# Patient Record
Sex: FEMALE | Race: WHITE
[De-identification: names, ages, dates, MRNs, and addresses within clinical notes are randomized per-mention and may not be internally consistent; named-entity substitution may affect disease eponyms.]

---

## 2017-01-04 ENCOUNTER — HOSPITAL ENCOUNTER (EMERGENCY)
Dept: HOSPITAL 72 - EMR | Age: 32
Discharge: HOME | End: 2017-01-04
Payer: MEDICARE

## 2017-01-04 VITALS — DIASTOLIC BLOOD PRESSURE: 79 MMHG | SYSTOLIC BLOOD PRESSURE: 110 MMHG

## 2017-01-04 VITALS — SYSTOLIC BLOOD PRESSURE: 107 MMHG | DIASTOLIC BLOOD PRESSURE: 75 MMHG

## 2017-01-04 VITALS — BODY MASS INDEX: 19.16 KG/M2 | HEIGHT: 65 IN | WEIGHT: 115 LBS

## 2017-01-04 DIAGNOSIS — Z91.040: ICD-10-CM

## 2017-01-04 DIAGNOSIS — Z87.898: ICD-10-CM

## 2017-01-04 DIAGNOSIS — G43.909: Primary | ICD-10-CM

## 2017-01-04 DIAGNOSIS — Z88.2: ICD-10-CM

## 2017-01-04 DIAGNOSIS — Z88.0: ICD-10-CM

## 2017-01-04 DIAGNOSIS — Z87.19: ICD-10-CM

## 2017-01-04 DIAGNOSIS — Z88.6: ICD-10-CM

## 2017-01-04 DIAGNOSIS — Z87.42: ICD-10-CM

## 2017-01-04 LAB
APPEARANCE UR: CLEAR
BACTERIA #/AREA URNS HPF: (no result) /HPF
KETONES UR QL STRIP: NEGATIVE
LEUKOCYTE ESTERASE UR QL STRIP: (no result)
NITRITE UR QL STRIP: NEGATIVE
PH UR STRIP: 6 [PH] (ref 4.5–8)
PROT UR QL STRIP: NEGATIVE
RBC #/AREA URNS HPF: (no result) /HPF (ref 0–2)
SP GR UR STRIP: 1.01 (ref 1–1.03)
SQUAMOUS #/AREA URNS LPF: (no result) /LPF
UROBILINOGEN UR-MCNC: NORMAL MG/DL (ref 0–1)
WBC #/AREA URNS HPF: (no result) /HPF (ref 0–2)

## 2017-01-04 PROCEDURE — 96375 TX/PRO/DX INJ NEW DRUG ADDON: CPT

## 2017-01-04 PROCEDURE — 36415 COLL VENOUS BLD VENIPUNCTURE: CPT

## 2017-01-04 PROCEDURE — 81003 URINALYSIS AUTO W/O SCOPE: CPT

## 2017-01-04 PROCEDURE — 99284 EMERGENCY DEPT VISIT MOD MDM: CPT

## 2017-01-04 PROCEDURE — 83690 ASSAY OF LIPASE: CPT

## 2017-01-04 PROCEDURE — 96374 THER/PROPH/DIAG INJ IV PUSH: CPT

## 2017-01-04 NOTE — EMERGENCY ROOM REPORT
History of Present Illness


General


Chief Complaint:  Pain





Present Illness


HPI


32 YO female presents to the ED c/o  migraine HA 10/10 in severity progressive 

onset denies N/V/F/C. Describes migraine as usual migraine HA and has Neurology 

appt. next week. Pt. reports that Imitrex does not work for her and in the ED 

she usually gets Ativan and Benadryl for her migraines.  Pt also reports recent 

dx earlier this week with bilateral ovarian cysts and states continued pain due 

to OBGYN not prescribing her medications and telling her they will resolve on 

their own.  PT describes bilateral adenexal pain radiating into the flanks. 

denies hematuria, frequency, or dysuria. Lastly pt. also requests evaluation of 

her "pancreatitis" as she intermittently has epigastric pain and is NPO and 

requires TPN.  Pt. denies pregnancy states is on Nexplanon implant, Denies hx 

of STI or PID. Denies CP, Palpitations, LOC, AMS, dizziness, Changes in Vision, 

Sensation, paresthesias, or a sudden severe headache.


Allergies:  


Coded Allergies:  


     ASPIRIN (Unverified  Allergy, Unknown, 4/17/16)


     LATEX (Verified  Allergy, Unknown, 4/17/16)


     METOCLOPRAMIDE (Unverified  Allergy, Unknown, 4/17/16)


     PENICILLIN (Unverified  Allergy, Unknown, 4/17/16)


     PROCHLORPERAZINE (Unverified  Allergy, Unknown, 8/5/15)


     SULFA (SULFONAMIDE ANTIBIOTICS) (Unverified  Allergy, Unknown, 8/5/15)


Uncoded Allergies:  


     SULFA (Allergy, Unknown, 4/17/16)





Patient History


Past Medical History:  see triage record


Past Surgical History:  none


Pertinent Family History:  none


Last Menstrual Period:  12/11/16


Pregnant Now:  No


Reviewed Nursing Documentation:  PMH: Agreed, PSxH: Agreed





Nursing Documentation-PMH


Hx Cardiac Problems:  No


Hx Hypertension:  No


Hx Pacemaker:  No


Hx Asthma:  No


Hx COPD:  No


Hx Diabetes:  No


Hx Cancer:  No


Hx Gastrointestinal Problems:  Yes - pancreatitis


Hx Dialysis:  Yes


Hx Neurological Problems:  Yes - Migraines


Hx Cerebrovascular Accident:  No


Hx Seizures:  No


Hx Vertigo:  Yes


Hx Dizziness:  Yes


Hx Headaches:  Yes - migraines





Review of Systems


All Other Systems:  negative except mentioned in HPI





Physical Exam





Vital Signs








  Date Time  Temp Pulse Resp B/P Pulse Ox O2 Delivery O2 Flow Rate FiO2


 


1/4/17 12:58 98.2 116 20 107/75 100 Room Air  








Sp02 EP Interpretation:  reviewed, normal - VS re-evaluated pt. is 

nontachycardic


General Appearance:  no apparent distress, alert, GCS 15, non-toxic, other - Pt 

observed from distance in NAD on her phone during ED visit.


Head:  normocephalic, atraumatic


Eyes:  bilateral eye PERRL, bilateral eye normal inspection


ENT:  hearing grossly normal, normal pharynx, no angioedema, normal voice


Neck:  full range of motion, supple/symm/no masses


Respiratory:  chest non-tender, lungs clear, normal breath sounds, speaking 

full sentences


Cardiovascular #1:  regular rate, rhythm


Cardiovascular #2:  2+ radial (R), 2+ radial (L)


Gastrointestinal:  normal bowel sounds, soft, no mass, no guarding, no rebound, 

other - diffuse TTP in epigastric area, RUQ , negative mcburnys, no peritoneal 

signs


Rectal:  deferred


Genitourinary:  normal inspection, no CVA tenderness, other - bilateral 

adenexal TTP, no LAD


Musculoskeletal:  back normal, gait/station normal, normal range of motion, non-

tender, no calf tenderness


Neurologic:  alert, oriented x3, responsive, motor strength/tone normal, 

sensory intact, cerebellar normal, normal gait, speech normal


Psychiatric:  judgement/insight normal, memory normal, mood/affect normal, no 

suicidal/homicidal ideation


Skin:  normal color, no rash, warm/dry, well hydrated


Lymphatic:  no adenopathy





Medical Decision Making


PA Attestation


Dr. George is my supervising Physician whom patient management has been 

discussed with.


Diagnostic Impression:  


 Primary Impression:  


 Migraine headache


 Qualified Codes:  G43.909 - Migraine, unspecified, not intractable, without 

status migrainosus


 Additional Impressions:  


 Hx of ovarian cyst


 Hx of pancreatitis


ER Course


Pt. presents to the ED c/o 


1) migraine 10/10 in severity, describes  typical migraine. progressive onset, 

with neurologist appt. next week. pt states Imitrex does not work for her, and 

states " only IV Ativan and Benadryl help "





2) Pt also requests "evaluation of her pancreatitis" PT reports chronic 

epigastric pain, nausea, decreased appetite, inability to have oral intake and 

requires TPN. 





3) Pt. reports continued Bilateral Adenexal pain with hx of ovarian cyst with 

US performed this past week at [facility]. pt reports "OBGYN saw bilateral ovarian 

cysts and stated there is no surgery needed, and also did not  prescribe her 

with anything."  





Ddx considered but are not limited to migraine, SAH, Psedudo motor Cerebri, 

Mass lesion, Cluster HA, Tension HA, Post lumbar puncture HA, acute pancreatitis

, ovarian cysts, torsion, ectopic pregnancy. 





Vital signs: are WNL, pt. is afebrile


H&PE are most consistent with migraine headache. 





- Previous recent visits with laboratory work noted to be normal. Pt noted to 

have multiple visits for both migraine and abdominal pain with similar 

presentations. I do not feel further laboratory or imaging tests are warranted 

other than Lipase and UA.  pt. is not actively vomiting at this time in NAD, PE 

does not suggest dehydration. Pt is ambulatory with no focal neurological 

deficits due to progressive onset and hx of migraines CT imaging is not 

warranted at this time.  





- D/w pt. that ovarian cysts are usually benign and treated with oral anti-

inflammatory medications. - Pt states she cannot have medications orally due to 

her gastritis and pancreatitis. 





ORDERS: 


- Lipase: 61 WNL


-UA: WNL no evidence of infection no wbc's no leukocyte esterase, few bacteria 

and few epithelial cells indicate contamination. 





ED INTERVENTIONS: 


25 mg Benadryl IV


-150mg Zantac- Pt. refused and requested all meds to be IV


-50mg Pepcid IV


-1mg Ativan IV


-25mg Benadryl (second dose) 








-After discussion of laboratory results and discharge plan pt. began to request 

stronger pain medications as interventions provided have not completely 

relieved her pain. 


- D/w pt. that she needs to follow up with GI specialist, Neurologist, OBGYN, 

PCP and possibly Chronic Pain management doctor, as she is stable for out 

patient treatment, and  normally she would be prescribed tylenol or motrin 

however she refused stating she can't have PO meds, d/w pt. that her 

presentation and lab results today to not warrant I.V pain medications and also 

discussed with pt. that we cant d/c her with I.V pain medications so giving her 

one dose here ultimately will only be a short-duration temporary intervention.





- Pt. makes several complaints that "we never completely treat her here in the 

ED, and even the doctors upstairs never follow up." - d/w pt. that her exam and 

laboratory work does not indicate further interventions or work up in the ED, 

that she is stable to outpatient follow up. 





-d/w pt. that  dilaudid and morphine are not standard of care for migraines. 





PT is well known to [facility] for pain related complaints, with drug seeking 

behavior, and Opioid dependence, in addition to non-satisfaction with multiple 

ED providers.  





DISCHARGE: At this time pt. is stable for d/c to home. Will provide printed 

patient care instructions, and any necessary prescriptions. Care plan and 

follow up instructions have been discussed with the patient prior to discharge.





Labs








Test


  1/4/17


14:01 1/4/17


15:00


 


Urine Color Pale yellow  


 


Urine Appearance Clear  


 


Urine pH 6 (4.5-8.0)  


 


Urine Specific Gravity


  1.015


(1.005-1.035) 


 


 


Urine Protein


  Negative


(NEGATIVE) 


 


 


Urine Glucose (UA)


  Negative


(NEGATIVE) 


 


 


Urine Ketones


  Negative


(NEGATIVE) 


 


 


Urine Occult Blood 1+ (NEGATIVE)  


 


Urine Nitrite


  Negative


(NEGATIVE) 


 


 


Urine Bilirubin


  Negative


(NEGATIVE) 


 


 


Urine Urobilinogen


  Normal MG/DL


(0.0-1.0) 


 


 


Urine Leukocyte Esterase 1+ (NEGATIVE)  


 


Urine RBC


  0-2 /HPF (0 -


2) 


 


 


Urine WBC


  0-2 /HPF (0 -


2) 


 


 


Urine Squamous Epithelial


Cells Few /LPF


(NONE/OCC) 


 


 


Urine Bacteria


  Few /HPF


(NONE) 


 


 


Lipase  61 U/L (< 60) 











Last Vital Signs








  Date Time  Temp Pulse Resp B/P Pulse Ox O2 Delivery O2 Flow Rate FiO2


 


1/4/17 12:58 98.2 116 20 107/75 100 Room Air  








Disposition:  HOME, SELF-CARE


Condition:  Stable


Referrals:  


NOT CHOSEN IPA/MD,REFERRING (PCP)


Patient Instructions:  Migraine Headache, Easy-to-Read





Additional Instructions:  


Take any previously prescribed medications as directed. 


-Follow up with your Neurologist  in 3-4 days 


- Follow up with your PCP in 3-4 days.


Return sooner to ED if new symptoms occur, or current symptoms become worse.











Ana Aguilar Jan 4, 2017 15:39

## 2017-02-12 ENCOUNTER — HOSPITAL ENCOUNTER (EMERGENCY)
Dept: HOSPITAL 54 - ER | Age: 32
Discharge: HOME | End: 2017-02-12
Payer: SELF-PAY

## 2017-02-12 VITALS — BODY MASS INDEX: 20.33 KG/M2 | WEIGHT: 122 LBS | HEIGHT: 65 IN

## 2017-02-12 VITALS — DIASTOLIC BLOOD PRESSURE: 97 MMHG | SYSTOLIC BLOOD PRESSURE: 128 MMHG

## 2017-02-12 DIAGNOSIS — Z88.8: ICD-10-CM

## 2017-02-12 DIAGNOSIS — Z88.6: ICD-10-CM

## 2017-02-12 DIAGNOSIS — Z90.49: ICD-10-CM

## 2017-02-12 DIAGNOSIS — K21.9: ICD-10-CM

## 2017-02-12 DIAGNOSIS — G43.909: Primary | ICD-10-CM

## 2017-02-12 PROCEDURE — A4606 OXYGEN PROBE USED W OXIMETER: HCPCS

## 2017-02-12 PROCEDURE — 99284 EMERGENCY DEPT VISIT MOD MDM: CPT

## 2017-02-12 PROCEDURE — 96361 HYDRATE IV INFUSION ADD-ON: CPT

## 2017-02-12 PROCEDURE — Z7610: HCPCS

## 2017-02-12 PROCEDURE — 96375 TX/PRO/DX INJ NEW DRUG ADDON: CPT

## 2017-02-12 PROCEDURE — 96374 THER/PROPH/DIAG INJ IV PUSH: CPT

## 2017-03-14 ENCOUNTER — HOSPITAL ENCOUNTER (EMERGENCY)
Dept: HOSPITAL 72 - EMR | Age: 32
Discharge: HOME | End: 2017-03-14
Payer: MEDICARE

## 2017-03-14 VITALS — WEIGHT: 115 LBS | HEIGHT: 65 IN | BODY MASS INDEX: 19.16 KG/M2

## 2017-03-14 VITALS — DIASTOLIC BLOOD PRESSURE: 72 MMHG | SYSTOLIC BLOOD PRESSURE: 111 MMHG

## 2017-03-14 DIAGNOSIS — F11.20: ICD-10-CM

## 2017-03-14 DIAGNOSIS — Z88.0: ICD-10-CM

## 2017-03-14 DIAGNOSIS — Z91.040: ICD-10-CM

## 2017-03-14 DIAGNOSIS — K86.1: Primary | ICD-10-CM

## 2017-03-14 DIAGNOSIS — Z88.8: ICD-10-CM

## 2017-03-14 DIAGNOSIS — Z88.6: ICD-10-CM

## 2017-03-14 DIAGNOSIS — E11.9: ICD-10-CM

## 2017-03-14 DIAGNOSIS — K21.9: ICD-10-CM

## 2017-03-14 DIAGNOSIS — Z88.2: ICD-10-CM

## 2017-03-14 LAB
ALBUMIN/GLOB SERPL: 1.6 {RATIO} (ref 1–2.7)
ALT SERPL-CCNC: 9 U/L (ref 3–33)
ANION GAP SERPL CALC-SCNC: 15 MMOL/L (ref 5–15)
APPEARANCE UR: CLEAR
AST SERPL-CCNC: 18 U/L (ref 5–40)
BACTERIA #/AREA URNS HPF: (no result) /HPF
BASOPHILS NFR BLD AUTO: 1.2 % (ref 0–2)
CALCIUM SERPL-MCNC: 9.2 MG/DL (ref 8.6–10.2)
CHLORIDE SERPL-SCNC: 104 MEQ/L (ref 98–107)
CO2 SERPL-SCNC: 25 MEQ/L (ref 20–30)
CREAT SERPL-MCNC: 0.6 MG/DL (ref 0.5–0.9)
EOSINOPHIL NFR BLD AUTO: 4.7 % (ref 0–3)
ERYTHROCYTE [DISTWIDTH] IN BLOOD BY AUTOMATED COUNT: 11.9 % (ref 11.6–14.8)
GFR SERPLBLD BASED ON 1.73 SQ M-ARVRAT: > 60 ML/MIN (ref 60–?)
GLOBULIN SER-MCNC: 2.6 G/DL
HEMOLYSIS: 7
KETONES UR QL STRIP: NEGATIVE
LEUKOCYTE ESTERASE UR QL STRIP: (no result)
LIPASE SERPL-CCNC: 81 U/L (ref ?–60)
LYMPHOCYTES NFR BLD AUTO: 48.5 % (ref 20–45)
MAGNESIUM SERPL-MCNC: 1.6 MG/DL (ref 1.7–2.5)
MCH RBC QN AUTO: 30 PG (ref 27–31)
MCHC RBC AUTO-ENTMCNC: 33.4 G/DL (ref 32–36)
MCV RBC AUTO: 90 FL (ref 80–99)
MONOCYTES NFR BLD AUTO: 6.8 % (ref 1–10)
NEUTROPHILS NFR BLD AUTO: 38.7 % (ref 45–75)
NITRITE UR QL STRIP: NEGATIVE
PH UR STRIP: 7 [PH] (ref 4.5–8)
PLATELET # BLD: 235 K/UL (ref 150–450)
PMV BLD AUTO: 5.8 FL (ref 6.5–10.1)
POTASSIUM SERPL-SCNC: 3.8 MEQ/L (ref 3.4–4.9)
PROT SERPL-MCNC: 6.8 G/DL (ref 6.6–8.7)
PROT UR QL STRIP: NEGATIVE
RBC # BLD AUTO: 3.83 M/UL (ref 4.2–5.4)
RBC #/AREA URNS HPF: (no result) /HPF (ref 0–2)
SODIUM SERPL-SCNC: 144 MEQ/L (ref 135–145)
SP GR UR STRIP: 1.01 (ref 1–1.03)
SQUAMOUS #/AREA URNS LPF: (no result) /LPF
UROBILINOGEN UR-MCNC: NORMAL MG/DL (ref 0–1)
WBC # BLD AUTO: 6.7 K/UL (ref 4.8–10.8)
WBC #/AREA URNS HPF: (no result) /HPF (ref 0–2)

## 2017-03-14 PROCEDURE — 36415 COLL VENOUS BLD VENIPUNCTURE: CPT

## 2017-03-14 PROCEDURE — 80053 COMPREHEN METABOLIC PANEL: CPT

## 2017-03-14 PROCEDURE — 99284 EMERGENCY DEPT VISIT MOD MDM: CPT

## 2017-03-14 PROCEDURE — 81025 URINE PREGNANCY TEST: CPT

## 2017-03-14 PROCEDURE — 85025 COMPLETE CBC W/AUTO DIFF WBC: CPT

## 2017-03-14 PROCEDURE — 82009 KETONE BODYS QUAL: CPT

## 2017-03-14 PROCEDURE — 83690 ASSAY OF LIPASE: CPT

## 2017-03-14 PROCEDURE — 96374 THER/PROPH/DIAG INJ IV PUSH: CPT

## 2017-03-14 PROCEDURE — 83735 ASSAY OF MAGNESIUM: CPT

## 2017-03-14 PROCEDURE — 96375 TX/PRO/DX INJ NEW DRUG ADDON: CPT

## 2017-03-14 PROCEDURE — 81003 URINALYSIS AUTO W/O SCOPE: CPT

## 2017-03-14 NOTE — EMERGENCY ROOM REPORT
History of Present Illness


General


Chief Complaint:  Abdominal Pain


Source:  Patient





Present Illness


HPI


31-year-old female presents to ED for evaluation.  Patient is well known to 

[facility]; has been here multiple times for complaint of 

pancreatitis and pain.  Patient states she is here because she's unable to keep 

fluids down and is having extreme pain.  Pain is sharp, epigastric, 10 of 10, 

nonradiating.  No aggravating relieving factors.  Patient states she did not 

have any pain medications at home.  Patient states her neurologist placed a 

PICC line so she can have pain medications.  Patient states she is not diabetic 

and her sugar is "out of control".  Denies chest pain or shortness of breath.  

Denies any other symptoms


Allergies:  


Coded Allergies:  


     ACETAMINOPHEN (Verified  Allergy, Unknown, 3/14/17)


     ASPIRIN (Unverified  Allergy, Unknown, 3/14/17)


     HALOPERIDOL (Verified  Allergy, Unknown, 3/14/17)


     LATEX (Verified  Allergy, Unknown, 3/14/17)


     METOCLOPRAMIDE (Unverified  Allergy, Unknown, 3/14/17)


     PENICILLIN (Unverified  Allergy, Unknown, 3/14/17)


     PROCHLORPERAZINE (Unverified  Allergy, Unknown, 8/5/15)


     SULFA (SULFONAMIDE ANTIBIOTICS) (Unverified  Allergy, Unknown, 8/5/15)


Uncoded Allergies:  


     SULFA (Allergy, Unknown, 4/17/16)





Patient History


Past Medical History:  DM, GERD, migraines, other - pancreatitis


Past Surgical History:  none


Pertinent Family History:  none


Social History:  Denies: alcohol use, drug use, smoking


Last Menstrual Period:  yesterday


Pregnant Now:  No


Immunizations:  UTD


Reviewed Nursing Documentation:  PMH: Agreed, PSxH: Agreed





Nursing Documentation-PMH


Past Medical History:  No History, Except For


Hx Cardiac Problems:  No - autoimmune disease


Hx Hypertension:  No


Hx Pacemaker:  No


Hx Asthma:  No


Hx COPD:  No


Hx Diabetes:  Yes - dm2


Hx Cancer:  No


Hx Gastrointestinal Problems:  Yes - pancreatitis


Hx Dialysis:  Yes


Hx Neurological Problems:  Yes - Migraines


Hx Cerebrovascular Accident:  No


Hx Seizures:  No


Hx Vertigo:  Yes


Hx Dizziness:  Yes


Hx Headaches:  Yes - migraines





Review of Systems


All Other Systems:  negative except mentioned in HPI





Physical Exam





Vital Signs








  Date Time  Temp Pulse Resp B/P Pulse Ox O2 Delivery O2 Flow Rate FiO2


 


3/14/17 03:01 98.1 111 16 129/87 97   


 


3/14/17 05:04      Room Air  








Sp02 EP Interpretation:  reviewed, normal


General Appearance:  mild distress


Head:  normocephalic


Eyes:  bilateral eye PERRL, bilateral eye normal inspection


ENT:  normal ENT inspection


Neck:  normal inspection


Respiratory:  chest non-tender, lungs clear, normal breath sounds, speaking 

full sentences


Cardiovascular #1:  regular rate, rhythm, no edema


Gastrointestinal:  normal bowel sounds, non tender, soft, non-distended, no 

guarding, no rebound


Rectal:  deferred


Genitourinary:  no CVA tenderness


Musculoskeletal:  normal inspection


Neurologic:  alert, oriented x3, responsive, motor strength/tone normal, 

sensory intact, speech normal


Psychiatric:  normal inspection


Skin:  normal inspection


Lymphatic:  normal inspection





Medical Decision Making


Diagnostic Impression:  


 Primary Impression:  


 Pancreatitis, chronic


 Qualified Codes:  K86.1 - Other chronic pancreatitis


 Additional Impression:  


 Opiate dependence


 Qualified Codes:  F11.20 - Opioid dependence, uncomplicated


ER Course


31-year-old female presents to ED complaining of epigastric pain, vomiting.  

History of chronic pancreatitis





Differential-dehydration, pancreatitis, gastritis





Patient placed on stretcher.  After initial history and physical I ordered labs

, IV fluids, Pepcid, Ativan and Benadryl.  Accu-Chek within normal limits





Labs-no leukocytosis, hemoglobin/hematocrit stable, electrolytes okay, lipase 

within normal limits, glucose within normal limits





Patient made multiple requests for pain medication.  I explained to patient 

that the emergency room is not an appropriate place for chronic pain control.  

Patient states she does pain management doctor, nor does she have a PMD.  

patient has multiple visits to [facility] [place] [place] for same complaint.





Diagnoses-chronic pancreatitis, opioid dependence





Stable and discharged to home.  Followup with PMD.  Return to ED if symptoms 

recur or worsen





Labs








Test


  3/14/17


03:35 3/14/17


04:20


 


White Blood Count


  6.7 K/UL


(4.8-10.8) 


 


 


Red Blood Count


  3.83 M/UL


(4.20-5.40) 


 


 


Hemoglobin


  11.5 G/DL


(12.0-16.0) 


 


 


Hematocrit


  34.4 %


(37.0-47.0) 


 


 


Mean Corpuscular Volume 90 FL (80-99)  


 


Mean Corpuscular Hemoglobin


  30.0 PG


(27.0-31.0) 


 


 


Mean Corpuscular Hemoglobin


Concent 33.4 G/DL


(32.0-36.0) 


 


 


Red Cell Distribution Width


  11.9 %


(11.6-14.8) 


 


 


Platelet Count


  235 K/UL


(150-450) 


 


 


Mean Platelet Volume


  5.8 FL


(6.5-10.1) 


 


 


Neutrophils (%) (Auto)


  38.7 %


(45.0-75.0) 


 


 


Lymphocytes (%) (Auto)


  48.5 %


(20.0-45.0) 


 


 


Monocytes (%) (Auto)


  6.8 %


(1.0-10.0) 


 


 


Eosinophils (%) (Auto)


  4.7 %


(0.0-3.0) 


 


 


Basophils (%) (Auto)


  1.2 %


(0.0-2.0) 


 


 


Sodium Level


  144 mEQ/L


(135-145) 


 


 


Potassium Level


  3.8 mEQ/L


(3.4-4.9) 


 


 


Chloride Level


  104 mEQ/L


() 


 


 


Carbon Dioxide Level


  25 mEQ/L


(20-30) 


 


 


Anion Gap 15 (5-15)  


 


Blood Urea Nitrogen


  12 mg/dL


(7-23) 


 


 


Creatinine


  0.6 mg/dL


(0.5-0.9) 


 


 


Estimat Glomerular Filtration


Rate > 60 mL/min


(>60) 


 


 


Glucose Level


  108 mg/dL


() 


 


 


Calcium Level


  9.2 mg/dL


(8.6-10.2) 


 


 


Magnesium Level


  1.6 mg/dL


(1.7-2.5) 


 


 


Total Bilirubin


  0.3 mg/dL


(0.0-1.2) 


 


 


Aspartate Amino Transf


(AST/SGOT) 18 U/L (5-40) 


  


 


 


Alanine Aminotransferase


(ALT/SGPT) 9 U/L (3-33) 


  


 


 


Alkaline Phosphatase


  53 U/L


() 


 


 


Total Protein


  6.8 g/dL


(6.6-8.7) 


 


 


Albumin


  4.2 g/dL


(3.5-5.2) 


 


 


Globulin 2.6 g/dL  


 


Albumin/Globulin Ratio 1.6 (1.0-2.7)  


 


Lipase 81 U/L (< 60)  


 


Acetone Level


  Negative


(NEGATIVE) 


 


 


Urine Color  Pale yellow 


 


Urine Appearance  Clear 


 


Urine pH  7 (4.5-8.0) 


 


Urine Specific Gravity


  


  1.010


(1.005-1.035)


 


Urine Protein


  


  Negative


(NEGATIVE)


 


Urine Glucose (UA)


  


  Negative


(NEGATIVE)


 


Urine Ketones


  


  Negative


(NEGATIVE)


 


Urine Occult Blood  2+ (NEGATIVE) 


 


Urine Nitrite


  


  Negative


(NEGATIVE)


 


Urine Bilirubin


  


  Negative


(NEGATIVE)


 


Urine Urobilinogen


  


  Normal MG/DL


(0.0-1.0)


 


Urine Leukocyte Esterase  1+ (NEGATIVE) 


 


Urine RBC


  


  0-2 /HPF (0 -


2)


 


Urine WBC


  


  0-2 /HPF (0 -


2)


 


Urine Squamous Epithelial


Cells 


  None /LPF


(NONE/OCC)


 


Urine Bacteria


  


  Few /HPF


(NONE)


 


Urine HCG, Qualitative  Negative 











Last Vital Signs








  Date Time  Temp Pulse Resp B/P Pulse Ox O2 Delivery O2 Flow Rate FiO2


 


3/14/17 05:12 98.1 113 20 111/72 100 Room Air  








Status:  improved


Disposition:  HOME, SELF-CARE


Condition:  Stable


Referrals:  


NOT CHOSEN IPA/MD,REFERRING (PCP)


Patient Instructions:  Whipple Procedure











YULY WALKER M.D. Mar 14, 2017 06:27

## 2017-04-12 ENCOUNTER — HOSPITAL ENCOUNTER (EMERGENCY)
Dept: HOSPITAL 72 - EMR | Age: 32
LOS: 1 days | Discharge: HOME | End: 2017-04-13
Payer: MEDICARE

## 2017-04-12 VITALS — HEIGHT: 65 IN | WEIGHT: 115 LBS | BODY MASS INDEX: 19.16 KG/M2

## 2017-04-12 DIAGNOSIS — E11.9: ICD-10-CM

## 2017-04-12 DIAGNOSIS — Z90.49: ICD-10-CM

## 2017-04-12 DIAGNOSIS — F51.01: ICD-10-CM

## 2017-04-12 DIAGNOSIS — R10.9: Primary | ICD-10-CM

## 2017-04-12 DIAGNOSIS — Z88.2: ICD-10-CM

## 2017-04-12 PROCEDURE — 96374 THER/PROPH/DIAG INJ IV PUSH: CPT

## 2017-04-12 PROCEDURE — 80053 COMPREHEN METABOLIC PANEL: CPT

## 2017-04-12 PROCEDURE — 99284 EMERGENCY DEPT VISIT MOD MDM: CPT

## 2017-04-12 PROCEDURE — 85025 COMPLETE CBC W/AUTO DIFF WBC: CPT

## 2017-04-12 PROCEDURE — 36415 COLL VENOUS BLD VENIPUNCTURE: CPT

## 2017-04-13 VITALS — DIASTOLIC BLOOD PRESSURE: 63 MMHG | SYSTOLIC BLOOD PRESSURE: 108 MMHG

## 2017-04-13 LAB
ALBUMIN/GLOB SERPL: 1.5 {RATIO} (ref 1–2.7)
ALT SERPL-CCNC: 9 U/L (ref 3–33)
ANION GAP SERPL CALC-SCNC: 11 MMOL/L (ref 5–15)
AST SERPL-CCNC: 15 U/L (ref 5–40)
BASOPHILS NFR BLD AUTO: 0.8 % (ref 0–2)
CALCIUM SERPL-MCNC: 8.4 MG/DL (ref 8.6–10.2)
CHLORIDE SERPL-SCNC: 105 MEQ/L (ref 98–107)
CO2 SERPL-SCNC: 26 MEQ/L (ref 20–30)
CREAT SERPL-MCNC: 0.5 MG/DL (ref 0.5–0.9)
EOSINOPHIL NFR BLD AUTO: 1.8 % (ref 0–3)
ERYTHROCYTE [DISTWIDTH] IN BLOOD BY AUTOMATED COUNT: 12.4 % (ref 11.6–14.8)
GFR SERPLBLD BASED ON 1.73 SQ M-ARVRAT: > 60 ML/MIN (ref 60–?)
GLOBULIN SER-MCNC: 2.4 G/DL
HEMOLYSIS: 3
LYMPHOCYTES NFR BLD AUTO: 47.7 % (ref 20–45)
MCH RBC QN AUTO: 29.7 PG (ref 27–31)
MCHC RBC AUTO-ENTMCNC: 32.5 G/DL (ref 32–36)
MCV RBC AUTO: 91 FL (ref 80–99)
MONOCYTES NFR BLD AUTO: 8.4 % (ref 1–10)
NEUTROPHILS NFR BLD AUTO: 41.3 % (ref 45–75)
PLATELET # BLD: 227 K/UL (ref 150–450)
PMV BLD AUTO: 6.7 FL (ref 6.5–10.1)
POTASSIUM SERPL-SCNC: 3.8 MEQ/L (ref 3.4–4.9)
PROT SERPL-MCNC: 6.1 G/DL (ref 6.6–8.7)
RBC # BLD AUTO: 3.46 M/UL (ref 4.2–5.4)
SODIUM SERPL-SCNC: 142 MEQ/L (ref 135–145)
WBC # BLD AUTO: 4.9 K/UL (ref 4.8–10.8)

## 2017-04-13 NOTE — EMERGENCY ROOM REPORT
History of Present Illness


General


Chief Complaint:  Abdominal Pain


Source:  Patient





Present Illness


HPI


31YOF well-known to [facility] [place] presents with right sided abd pain and nausea - states 

feels different from chronic pancreatitis-type pain.  Denies vomiting, fever/

chills, diarrhea, urinary complaints.  States also "doesnt feel well" because 

she just got lovenox injection for suspected blood clot in her PICC line and 

right sided occipital nerve block for chronic pain.  Requesting IV dilaudid for 

pain, IV ativan "because I havent slept in 4 days" and IV benadryl.  She 

otherwise denies chest pain, SOB.


Allergies:  


Coded Allergies:  


     ACETAMINOPHEN (Verified  Allergy, Unknown, 3/14/17)


     ASPIRIN (Unverified  Allergy, Unknown, 3/14/17)


     HALOPERIDOL (Verified  Allergy, Unknown, 3/14/17)


     LATEX (Verified  Allergy, Unknown, 3/14/17)


     METOCLOPRAMIDE (Unverified  Allergy, Unknown, 3/14/17)


     PENICILLIN (Unverified  Allergy, Unknown, 3/14/17)


     PROCHLORPERAZINE (Unverified  Allergy, Unknown, 8/5/15)


     SULFA (SULFONAMIDE ANTIBIOTICS) (Unverified  Allergy, Unknown, 8/5/15)


Uncoded Allergies:  


     SULFA (Allergy, Unknown, 4/17/16)





Patient History


Past Medical History:  see triage record, old chart reviewed


Past Surgical History:  none, anne


Pertinent Family History:  none


Social History:  Denies: alcohol use, drug use, smoking


Last Menstrual Period:  4/7/17


Pregnant Now:  No


Immunizations:  UTD


Reviewed Nursing Documentation:  PMH: Agreed, PSxH: Agreed





Nursing Documentation-PMH


Hx Cardiac Problems:  No - autoimmune disease


Hx Hypertension:  No


Hx Pacemaker:  No


Hx Asthma:  No


Hx COPD:  No


Hx Diabetes:  Yes - dm2


Hx Cancer:  No


Hx Gastrointestinal Problems:  Yes - pancreatitis


Hx Dialysis:  Yes


Hx Neurological Problems:  Yes - Migraines


Hx Cerebrovascular Accident:  No


Hx Seizures:  No


Hx Vertigo:  Yes


Hx Dizziness:  Yes


Hx Headaches:  Yes - migraines





Review of Systems


All Other Systems:  negative except mentioned in HPI





Physical Exam





Vital Signs








  Date Time  Temp Pulse Resp B/P Pulse Ox O2 Delivery O2 Flow Rate FiO2


 


4/12/17 23:49 98.2 89 18 114/70 99 Room Air  








Sp02 EP Interpretation:  reviewed, normal


General Appearance:  normal inspection, well appearing, no apparent distress, 

alert, GCS 15, non-toxic, other - lying in stretcher wearing eye/sleep mask


Head:  normocephalic, atraumatic


Eyes:  bilateral eye EOMI, bilateral eye PERRL


ENT:  normal ENT inspection, hearing grossly normal, normal voice


Neck:  normal inspection, full range of motion, supple, no bony tend


Respiratory:  normal inspection, lungs clear, normal breath sounds, no 

respiratory distress, no retraction, no wheezing


Cardiovascular #1:  regular rate, rhythm, no edema


Gastrointestinal:  normal inspection, normal bowel sounds, non tender, soft, no 

mass, no organomegaly, no guarding, no hernia


Genitourinary:  no CVA tenderness


Musculoskeletal:  normal inspection, back normal, normal range of motion, Katheryn'

s Sign negative


Neurologic:  normal inspection, alert, oriented x3, responsive, CNs III-XII nml 

as tested, motor strength/tone normal, speech normal


Psychiatric:  normal inspection, judgement/insight normal, mood/affect normal


Skin:  normal inspection, normal color, no rash


Lymphatic:  normal inspection





Medical Decision Making


Diagnostic Impression:  


 Primary Impression:  


 Right-sided abdominal pain of unknown cause


 Additional Impression:  


 Insomnia


 Qualified Codes:  F51.01 - Primary insomnia


ER Course


31-year-old female presents to ED complaining of right sided abd pain and 

insomnia


DDX: dehydration, pancreatitis, gastritis





Patient received IV fluids, dilaudid, Ativan and Benadryl.  


Labs-no leukocytosis, hemoglobin/hematocrit stable, electrolytes okay, lipase 

within normal limits, glucose within normal limits





Patient made multiple requests for pain medication, medication for acid reflux, 

and for insomnia, hydration.  I, as did previous ER doctors here, explained to 

patient that the emergency room is not an appropriate place for chronic pain 

control.  Patient states she does pain management doctor, nor does she have a 

PMD.  patient has multiple visits to [facility] [place] for same complaint.





Diagnoses-abdominal pain, insomnia, drug-seeking behavior, opioid dependence





Stable and discharged to home.  Followup with PMD.  Return to ED if symptoms 

recur or worsen





Last Vital Signs








  Date Time  Temp Pulse Resp B/P Pulse Ox O2 Delivery O2 Flow Rate FiO2


 


4/12/17 23:49 98.2 89 18 114/70 99 Room Air  








Status:  improved


Disposition:  HOME, SELF-CARE


Referrals:  


TIFF CHAMPION (PCP)











AMANDA TRUJILLO M.D. Apr 13, 2017 02:17

## 2017-04-30 ENCOUNTER — HOSPITAL ENCOUNTER (EMERGENCY)
Dept: HOSPITAL 72 - EMR | Age: 32
Discharge: HOME | End: 2017-04-30
Payer: MEDICARE

## 2017-04-30 VITALS — SYSTOLIC BLOOD PRESSURE: 107 MMHG | DIASTOLIC BLOOD PRESSURE: 78 MMHG

## 2017-04-30 VITALS — WEIGHT: 115 LBS | BODY MASS INDEX: 19.16 KG/M2 | HEIGHT: 65 IN

## 2017-04-30 DIAGNOSIS — Z86.69: ICD-10-CM

## 2017-04-30 DIAGNOSIS — Z88.6: ICD-10-CM

## 2017-04-30 DIAGNOSIS — E11.9: ICD-10-CM

## 2017-04-30 DIAGNOSIS — F11.20: ICD-10-CM

## 2017-04-30 DIAGNOSIS — Z88.0: ICD-10-CM

## 2017-04-30 DIAGNOSIS — Z88.2: ICD-10-CM

## 2017-04-30 DIAGNOSIS — Z91.040: ICD-10-CM

## 2017-04-30 DIAGNOSIS — R51: Primary | ICD-10-CM

## 2017-04-30 DIAGNOSIS — Z88.8: ICD-10-CM

## 2017-04-30 LAB
ANION GAP SERPL CALC-SCNC: 14 MMOL/L (ref 5–15)
APPEARANCE UR: CLEAR
BACTERIA #/AREA URNS HPF: (no result) /HPF
BASOPHILS NFR BLD AUTO: 0.6 % (ref 0–2)
CALCIUM SERPL-MCNC: 8.8 MG/DL (ref 8.6–10.2)
CHLORIDE SERPL-SCNC: 101 MEQ/L (ref 98–107)
CO2 SERPL-SCNC: 25 MEQ/L (ref 20–30)
CREAT SERPL-MCNC: 0.6 MG/DL (ref 0.5–0.9)
EOSINOPHIL NFR BLD AUTO: 1.2 % (ref 0–3)
ERYTHROCYTE [DISTWIDTH] IN BLOOD BY AUTOMATED COUNT: 12.4 % (ref 11.6–14.8)
GFR SERPLBLD BASED ON 1.73 SQ M-ARVRAT: > 60 ML/MIN (ref 60–?)
HEMOLYSIS: 1
KETONES UR QL STRIP: (no result)
LEUKOCYTE ESTERASE UR QL STRIP: NEGATIVE
LYMPHOCYTES NFR BLD AUTO: 19.5 % (ref 20–45)
MCH RBC QN AUTO: 30.1 PG (ref 27–31)
MCHC RBC AUTO-ENTMCNC: 33.8 G/DL (ref 32–36)
MCV RBC AUTO: 89 FL (ref 80–99)
MONOCYTES NFR BLD AUTO: 4.6 % (ref 1–10)
NEUTROPHILS NFR BLD AUTO: 74.2 % (ref 45–75)
NITRITE UR QL STRIP: NEGATIVE
PH UR STRIP: 5 [PH] (ref 4.5–8)
PLATELET # BLD: 230 K/UL (ref 150–450)
PMV BLD AUTO: 5.4 FL (ref 6.5–10.1)
POTASSIUM SERPL-SCNC: 4.3 MEQ/L (ref 3.4–4.9)
PROT UR QL STRIP: NEGATIVE
RBC # BLD AUTO: 3.47 M/UL (ref 4.2–5.4)
RBC #/AREA URNS HPF: (no result) /HPF (ref 0–2)
SODIUM SERPL-SCNC: 140 MEQ/L (ref 135–145)
SP GR UR STRIP: 1.02 (ref 1–1.03)
SQUAMOUS #/AREA URNS LPF: (no result) /LPF
UROBILINOGEN UR-MCNC: NORMAL MG/DL (ref 0–1)
WBC # BLD AUTO: 8.7 K/UL (ref 4.8–10.8)
WBC #/AREA URNS HPF: 0 /HPF (ref 0–2)

## 2017-04-30 PROCEDURE — 85025 COMPLETE CBC W/AUTO DIFF WBC: CPT

## 2017-04-30 PROCEDURE — 81025 URINE PREGNANCY TEST: CPT

## 2017-04-30 PROCEDURE — 96374 THER/PROPH/DIAG INJ IV PUSH: CPT

## 2017-04-30 PROCEDURE — 80300: CPT

## 2017-04-30 PROCEDURE — 96375 TX/PRO/DX INJ NEW DRUG ADDON: CPT

## 2017-04-30 PROCEDURE — 99284 EMERGENCY DEPT VISIT MOD MDM: CPT

## 2017-04-30 PROCEDURE — 36415 COLL VENOUS BLD VENIPUNCTURE: CPT

## 2017-04-30 PROCEDURE — 80048 BASIC METABOLIC PNL TOTAL CA: CPT

## 2017-04-30 PROCEDURE — 70450 CT HEAD/BRAIN W/O DYE: CPT

## 2017-04-30 PROCEDURE — 81001 URINALYSIS AUTO W/SCOPE: CPT

## 2017-04-30 NOTE — EMERGENCY ROOM REPORT
History of Present Illness


General


Chief Complaint:  Headache


Source:  Patient





Present Illness


HPI


Is a 31-year-old female who has a history of chronic pain with multiple 

complaint.  She attributed this to having is on mentation several years ago.  

Since then she been having a lot of trouble.  She refused to remove however.  

She has history of chronic headache migraine headache.  She said tonight it was 

different.  She said that instead of being on the right side is on the left 

side.  She also complaining of a whole body being numb.  Also complaining of 

generalized pain 10 out of 10.  Denies any fever or chills.  Denies any nausea 

vomiting.  Requesting IV Dilaudid and Benadryl.  She said that she hasn't eat 

or drink anything for a day.  This similar symptom in the past.  Similar 

presentation.  She said she hasn't been to this hospital for a month.  Has been 

to [place] for over a month.  She actually checked into see her today but didn't 

want to wait and came here.  She was there on  for the same thing.


Allergies:  


Coded Allergies:  


     ACETAMINOPHEN (Verified  Allergy, Unknown, 3/14/17)


     ASPIRIN (Unverified  Allergy, Unknown, 3/14/17)


     HALOPERIDOL (Verified  Allergy, Unknown, 3/14/17)


     LATEX (Verified  Allergy, Unknown, 3/14/17)


     METOCLOPRAMIDE (Unverified  Allergy, Unknown, 3/14/17)


     PENICILLIN (Unverified  Allergy, Unknown, 3/14/17)


     PROCHLORPERAZINE (Unverified  Allergy, Unknown, 8/5/15)


     SULFA (SULFONAMIDE ANTIBIOTICS) (Unverified  Allergy, Unknown, 8/5/15)


Uncoded Allergies:  


     SULFA (Allergy, Unknown, 16)





Patient History


Past Medical History:  see triage record, old chart reviewed


Past Surgical History:  other


Pertinent Family History:  none


Social History:  Denies: smoking


Last Menstrual Period:  


Pregnant Now:  No


:  0


Immunizations:  other


Reviewed Nursing Documentation:  PMH: Agreed, PSxH: Agreed





Nursing Documentation-PMH


Hx Cardiac Problems:  No - autoimmune disease


Hx Pacemaker:  No


Hx Asthma:  No


Hx COPD:  No


Hx Diabetes:  Yes - dm2


Hx Cancer:  No


Hx Dialysis:  Yes


Hx Neurological Problems:  Yes - Migraines


Hx Cerebrovascular Accident:  No


Hx Seizures:  No


Hx Vertigo:  Yes


Hx Dizziness:  Yes


Hx Headaches:  Yes - migraines





Review of Systems


Eye:  Reports: blurred vision, Denies: eye pain


ENT:  Denies: ear pain, nose congestion, throat swelling


Respiratory:  Denies: cough, shortness of breath


Cardiovascular:  Denies: chest pain, palpitations


Gastrointestinal:  Denies: abdominal pain, diarrhea, nausea, vomiting


Musculoskeletal:  Denies: back pain, joint pain


Skin:  Denies: rash


Neurological:  Reports: headache, numbness


Endocrine:  Denies: increased thirst, increased urine


Hematologic/Lymphatic:  Denies: easy bruising


All Other Systems:  negative except mentioned in HPI





Physical Exam





Vital Signs








  Date Time  Temp Pulse Resp B/P Pulse Ox O2 Delivery O2 Flow Rate FiO2


 


17 01:48 97.9 102 18 119/73 99   





vital signs remarkable


Sp02 EP Interpretation:  reviewed, normal


General Appearance:  well appearing, no apparent distress, alert


Head:  normocephalic, atraumatic


Eyes:  bilateral eye EOMI, bilateral eye PERRL


ENT:  hearing grossly normal, normal pharynx


Neck:  full range of motion, supple, no meningismus


Respiratory:  chest non-tender, lungs clear, normal breath sounds


Cardiovascular #1:  regular rate, rhythm, no murmur


Gastrointestinal:  normal bowel sounds, non tender, no mass, no organomegaly, 

no bruit, non-distended


Musculoskeletal:  back normal, gait/station normal, normal range of motion


Neurologic:  alert, oriented x3


Psychiatric:  anxious - histrionic


Skin:  warm/dry





Medical Decision Making


Diagnostic Impression:  


 Primary Impression:  


 Headache


 Qualified Codes:  R51 - Headache


 Additional Impressions:  


 Opiate dependence


 Qualified Codes:  F11.20 - Opioid dependence, uncomplicated


 Gastritis


 Qualified Codes:  K29.00 - Acute gastritis without bleeding


ER Course


Patient presents with exacerbation of chronic headache.  She said that this is 

different and requests a CT scan.  CT scan unremarkable.  She said that she 

could not walk but walked to the bathroom without a problem.  Labs 

unremarkable.  Her PICC line show no evidence of obstruction or infection.  I 

told patient that the Academy of neurology discourage use of narcotic for 

headache.  I am uncomfortable giving her Dilaudid IV for headache without any 

new changes. this can cause addiction in rebound headache.  I see no evidence 

of profound dehydration.  We'll discharge home.


Lab Results Impression


labs unremarkable


CT/MRI/US Diagnostic Results


CT/MRI/US Diagnostic Results :  


   Imaging Test Ordered:  CT head


   Impression


negative per radiologist





Last Vital Signs








  Date Time  Temp Pulse Resp B/P Pulse Ox O2 Delivery O2 Flow Rate FiO2


 


17 01:48 97.9 102 18 119/73 99   








Status:  improved


Disposition:  HOME, SELF-CARE


Condition:  Stable


Patient Instructions:  Migraine Headache





Additional Instructions:  


Followup with your doctor as scheduled.  Return for increasing pain, fever, 

chills, or any concern.











SHERIN CONKLIN M.D. 2017 03:01

## 2017-05-01 NOTE — DIAGNOSTIC IMAGING REPORT
Indications: 36 hours



Technique:



Continuous helical CT imaging of the brain was performed with automatic exposure

control on a Siemens sensation 64 multidetector CT scanner. Axial and coronal images

were reconstructed at 5 mm slice thickness and interval.



CTDI volume(s):  70 mGy

Total DLP:  1354 mGy-cm



Findings: Comparison:  None.



Intracranial anatomy is unremarkable. No evidence of mass or hemorrhage, 

other

attenuation abnormality, mass effect, midline shift, hydrocephalus or 

increased

intracranial pressure. Bone window images are unremarkable.  Visualized paranasal

sinuses and mastoid air cells are clear.



IMPRESSION:



Negative noncontrast CT scan of the brain .



This correlates with [facility] preliminary report.





The CT scanner at [facility] is accredited by the American College 

of

Radiology and the scans are performed using protocols designed to limit 

radiation

exposure to as low as reasonably achievable to attain images of sufficient

resolution adequate for diagnostic evaluation.

## 2017-05-09 ENCOUNTER — HOSPITAL ENCOUNTER (EMERGENCY)
Dept: HOSPITAL 12 - ER | Age: 32
Discharge: LEFT BEFORE DISCHARGE | End: 2017-05-09
Payer: MEDICARE

## 2017-05-09 VITALS — HEIGHT: 65 IN | WEIGHT: 112 LBS | BODY MASS INDEX: 18.66 KG/M2

## 2017-05-09 DIAGNOSIS — G89.29: ICD-10-CM

## 2017-05-09 DIAGNOSIS — R10.9: Primary | ICD-10-CM

## 2017-05-09 PROCEDURE — A4663 DIALYSIS BLOOD PRESSURE CUFF: HCPCS

## 2017-05-09 NOTE — NUR
-------------------------------------------------------------------------------

            *** Note undone in ED - 05/09/17 at 1109 by TWIN ***             

-------------------------------------------------------------------------------

Patient ambulated with brisk steady gait with her mother in room 2B, 
respiration:easy, NAD, pending MD evaluation.

## 2017-05-18 ENCOUNTER — HOSPITAL ENCOUNTER (EMERGENCY)
Dept: HOSPITAL 54 - ER | Age: 32
Discharge: HOME | End: 2017-05-18
Payer: MEDICARE

## 2017-05-18 VITALS — SYSTOLIC BLOOD PRESSURE: 127 MMHG | DIASTOLIC BLOOD PRESSURE: 75 MMHG

## 2017-05-18 VITALS — HEIGHT: 65 IN | WEIGHT: 112 LBS | BODY MASS INDEX: 18.66 KG/M2

## 2017-05-18 DIAGNOSIS — Z88.6: ICD-10-CM

## 2017-05-18 DIAGNOSIS — Z90.49: ICD-10-CM

## 2017-05-18 DIAGNOSIS — N39.0: Primary | ICD-10-CM

## 2017-05-18 DIAGNOSIS — K85.90: ICD-10-CM

## 2017-05-18 DIAGNOSIS — D64.9: ICD-10-CM

## 2017-05-18 DIAGNOSIS — G43.909: ICD-10-CM

## 2017-05-18 DIAGNOSIS — K21.9: ICD-10-CM

## 2017-05-18 DIAGNOSIS — Z88.8: ICD-10-CM

## 2017-05-18 LAB
ALBUMIN SERPL BCP-MCNC: 4.4 G/DL (ref 3.4–5)
ALP SERPL-CCNC: 65 U/L (ref 46–116)
ALT SERPL W P-5'-P-CCNC: 13 U/L (ref 12–78)
AST SERPL W P-5'-P-CCNC: 16 U/L (ref 15–37)
BACTERIA UR CULT: YES
BASOPHILS # BLD AUTO: 0 /CMM (ref 0–0.2)
BASOPHILS NFR BLD AUTO: 0.3 % (ref 0–2)
BILIRUB DIRECT SERPL-MCNC: 0.1 MG/DL (ref 0–0.2)
BILIRUB SERPL-MCNC: 0.5 MG/DL (ref 0.2–1)
BILIRUB UR QL STRIP: (no result)
BUN SERPL-MCNC: 9 MG/DL (ref 7–18)
CALCIUM SERPL-MCNC: 8.6 MG/DL (ref 8.5–10.1)
CHLORIDE SERPL-SCNC: 105 MMOL/L (ref 98–107)
CO2 SERPL-SCNC: 25 MMOL/L (ref 21–32)
CREAT SERPL-MCNC: 0.8 MG/DL (ref 0.6–1.3)
EOSINOPHIL # BLD AUTO: 0.1 /CMM (ref 0–0.7)
EOSINOPHIL NFR BLD AUTO: 2.4 % (ref 0–6)
GFR SERPLBLD BASED ON 1.73 SQ M-ARVRAT: 83 ML/MIN (ref 60–?)
GLUCOSE SERPL-MCNC: 80 MG/DL (ref 74–106)
HCG UR QL: (no result)
HCT VFR BLD AUTO: 31 % (ref 33–45)
HGB BLD-MCNC: 10.4 G/DL (ref 11.5–14.8)
KETONES UR STRIP-MCNC: 40 MG/DL
LIPASE SERPL-CCNC: 163 U/L (ref 73–393)
LYMPHOCYTES NFR BLD AUTO: 1.1 /CMM (ref 0.8–4.8)
LYMPHOCYTES NFR BLD AUTO: 23.7 % (ref 20–44)
MCH RBC QN AUTO: 29 PG (ref 26–33)
MCHC RBC AUTO-ENTMCNC: 34 G/DL (ref 31–36)
MCV RBC AUTO: 87 FL (ref 82–100)
MONOCYTES NFR BLD AUTO: 0.4 /CMM (ref 0.1–1.3)
MONOCYTES NFR BLD AUTO: 7.6 % (ref 2–12)
NEUTROPHILS # BLD AUTO: 3.2 /CMM (ref 1.8–8.9)
NEUTROPHILS NFR BLD AUTO: 66 % (ref 43–81)
PH UR STRIP: 6 [PH] (ref 5–8)
PLATELET # BLD AUTO: 210 /CMM (ref 150–450)
POTASSIUM SERPL-SCNC: 3.8 MMOL/L (ref 3.5–5.1)
PROT SERPL-MCNC: 7.1 G/DL (ref 6.4–8.2)
RBC # BLD AUTO: 3.6 MIL/UL (ref 4–5.2)
RBC #/AREA URNS HPF: (no result) /HPF (ref 0–2)
RDW COEFFICIENT OF VARIATION: 12.7 (ref 11.5–15)
SODIUM SERPL-SCNC: 140 MMOL/L (ref 136–145)
SP GR UR STRIP: 1.02 (ref 1–1.03)
UROBILINOGEN UR STRIP-MCNC: 0.2 EU/DL
WBC #/AREA URNS HPF: (no result) /HPF (ref 0–3)
WBC NRBC COR # BLD AUTO: 4.9 K/UL (ref 4.3–11)

## 2017-05-18 PROCEDURE — A4606 OXYGEN PROBE USED W OXIMETER: HCPCS

## 2017-05-18 PROCEDURE — Z7610: HCPCS

## 2017-05-18 PROCEDURE — C9113 INJ PANTOPRAZOLE SODIUM, VIA: HCPCS

## 2017-05-18 NOTE — NUR
TO BED 3 AMBULATORY C/O FEVER AND N/V/D ON MONDAY, NOW C/O DECREASE APPETITE. 
PT AAOX4 NO ACUTE DISTRESS NOTED, RESP EVEN AND UNLABORED.URINE SAMPLE 
COLLECTED. PENDING ER MD CHAN.

## 2017-05-18 NOTE — NUR
Patient discharged to home in stable condition. Written and verbal after care 
instructions given. Patient verbalizes understanding of instruction. ambulatory 
with a steady gait noted. pt aaox4 no acute distress noted, resp even and 
unlabored. advice pt not to drive or operate any machinery due to pt was given 
morphine. pt verbalize understanding. pt mom at bedside to take pt home.

## 2017-06-12 ENCOUNTER — HOSPITAL ENCOUNTER (EMERGENCY)
Dept: HOSPITAL 72 - EMR | Age: 32
Discharge: HOME | End: 2017-06-12
Payer: MEDICARE

## 2017-06-12 VITALS — SYSTOLIC BLOOD PRESSURE: 106 MMHG | DIASTOLIC BLOOD PRESSURE: 76 MMHG

## 2017-06-12 VITALS — BODY MASS INDEX: 18.33 KG/M2 | HEIGHT: 65 IN | WEIGHT: 110 LBS

## 2017-06-12 VITALS — DIASTOLIC BLOOD PRESSURE: 70 MMHG | SYSTOLIC BLOOD PRESSURE: 120 MMHG

## 2017-06-12 DIAGNOSIS — E11.9: ICD-10-CM

## 2017-06-12 DIAGNOSIS — F11.20: ICD-10-CM

## 2017-06-12 DIAGNOSIS — Z88.6: ICD-10-CM

## 2017-06-12 DIAGNOSIS — Z88.8: ICD-10-CM

## 2017-06-12 DIAGNOSIS — Z91.040: ICD-10-CM

## 2017-06-12 DIAGNOSIS — Z88.0: ICD-10-CM

## 2017-06-12 DIAGNOSIS — R11.2: ICD-10-CM

## 2017-06-12 DIAGNOSIS — Z88.2: ICD-10-CM

## 2017-06-12 DIAGNOSIS — K86.1: Primary | ICD-10-CM

## 2017-06-12 DIAGNOSIS — K21.9: ICD-10-CM

## 2017-06-12 LAB
ALBUMIN/GLOB SERPL: 2 {RATIO} (ref 1–2.7)
ALT SERPL-CCNC: 14 U/L (ref 3–33)
ANION GAP SERPL CALC-SCNC: 17 MMOL/L (ref 5–15)
APPEARANCE UR: CLEAR
AST SERPL-CCNC: 18 U/L (ref 5–40)
BACTERIA #/AREA URNS HPF: (no result) /HPF
BASOPHILS NFR BLD AUTO: 0.3 % (ref 0–2)
CALCIUM SERPL-MCNC: 9.3 MG/DL (ref 8.6–10.2)
CHLORIDE SERPL-SCNC: 103 MEQ/L (ref 98–107)
CO2 SERPL-SCNC: 21 MEQ/L (ref 20–30)
CREAT SERPL-MCNC: 0.8 MG/DL (ref 0.5–0.9)
EOSINOPHIL NFR BLD AUTO: 0 % (ref 0–3)
ERYTHROCYTE [DISTWIDTH] IN BLOOD BY AUTOMATED COUNT: 13.2 % (ref 11.6–14.8)
GFR SERPLBLD BASED ON 1.73 SQ M-ARVRAT: > 60 ML/MIN (ref 60–?)
GLOBULIN SER-MCNC: 2.1 G/DL
HEMOLYSIS: 3
HEMOLYSIS: 6
IRON SERPL-MCNC: 64 UG/DL (ref 37–145)
KETONES UR QL STRIP: NEGATIVE
LEUKOCYTE ESTERASE UR QL STRIP: NEGATIVE
LIPASE SERPL-CCNC: 73 U/L (ref ?–60)
LYMPHOCYTES NFR BLD AUTO: 14.7 % (ref 20–45)
MCH RBC QN AUTO: 28.8 PG (ref 27–31)
MCHC RBC AUTO-ENTMCNC: 32.6 G/DL (ref 32–36)
MCV RBC AUTO: 88 FL (ref 80–99)
MONOCYTES NFR BLD AUTO: 4.8 % (ref 1–10)
NEUTROPHILS NFR BLD AUTO: 80.2 % (ref 45–75)
NITRITE UR QL STRIP: NEGATIVE
PH UR STRIP: 6 [PH] (ref 4.5–8)
PHOSPHATE SERPL-MCNC: 3.2 MG/DL (ref 2.5–4.9)
PLATELET # BLD: 223 K/UL (ref 150–450)
PMV BLD AUTO: 6.4 FL (ref 6.5–10.1)
POTASSIUM SERPL-SCNC: 4.5 MEQ/L (ref 3.4–4.9)
PROT SERPL-MCNC: 6.5 G/DL (ref 6.6–8.7)
PROT UR QL STRIP: NEGATIVE
RBC # BLD AUTO: 3.48 M/UL (ref 4.2–5.4)
RBC #/AREA URNS HPF: (no result) /HPF (ref 0–2)
SODIUM SERPL-SCNC: 141 MEQ/L (ref 135–145)
SP GR UR STRIP: 1.01 (ref 1–1.03)
SQUAMOUS #/AREA URNS LPF: (no result) /LPF
TIBC SERPL-MCNC: 297 UG/DL (ref 250–400)
UROBILINOGEN UR-MCNC: NORMAL MG/DL (ref 0–1)
WBC # BLD AUTO: 7.5 K/UL (ref 4.8–10.8)
WBC #/AREA URNS HPF: (no result) /HPF (ref 0–2)

## 2017-06-12 PROCEDURE — 96374 THER/PROPH/DIAG INJ IV PUSH: CPT

## 2017-06-12 PROCEDURE — 85025 COMPLETE CBC W/AUTO DIFF WBC: CPT

## 2017-06-12 PROCEDURE — 84100 ASSAY OF PHOSPHORUS: CPT

## 2017-06-12 PROCEDURE — 36415 COLL VENOUS BLD VENIPUNCTURE: CPT

## 2017-06-12 PROCEDURE — 83690 ASSAY OF LIPASE: CPT

## 2017-06-12 PROCEDURE — 82306 VITAMIN D 25 HYDROXY: CPT

## 2017-06-12 PROCEDURE — 83550 IRON BINDING TEST: CPT

## 2017-06-12 PROCEDURE — 96375 TX/PRO/DX INJ NEW DRUG ADDON: CPT

## 2017-06-12 PROCEDURE — 99284 EMERGENCY DEPT VISIT MOD MDM: CPT

## 2017-06-12 PROCEDURE — 81003 URINALYSIS AUTO W/O SCOPE: CPT

## 2017-06-12 PROCEDURE — 80053 COMPREHEN METABOLIC PANEL: CPT

## 2017-06-12 PROCEDURE — 83540 ASSAY OF IRON: CPT

## 2017-06-13 NOTE — EMERGENCY ROOM REPORT
History of Present Illness


General


Chief Complaint:  Vomiting


Source:  Patient





Present Illness


HPI


31-year-old female presents to ED for evaluation.  Patient initially referred 

to have blood work done as outpatient but lab will not draw from PICC line so 

patient was sent to ER.  Patient is well-known to [facility] she has been here 

multiple times for pain-related to multiple GI issues.  Patient states she had 

blood work done yesterday which was abnormal and wanted to have it rechecked.  

Patient states she is not eating well and has persistent episodes of acid 

reflux and abdominal pain.  Pain is a 10 out of 10.  Sharp.  Nonradiating.  

Denies chest pain or shortness of breath.  Denies fevers chills.  bloodwork 

ordered by Dr Keller.  No other aggravating or relieving factors.  Denies any 

other associated symptoms


Allergies:  


Coded Allergies:  


     ACETAMINOPHEN (Verified  Allergy, Unknown, 3/14/17)


     ASPIRIN (Unverified  Allergy, Unknown, 3/14/17)


     HALOPERIDOL (Verified  Allergy, Unknown, 3/14/17)


     LATEX (Verified  Allergy, Unknown, 3/14/17)


     METOCLOPRAMIDE (Unverified  Allergy, Unknown, 3/14/17)


     PENICILLIN (Unverified  Allergy, Unknown, 3/14/17)


     PROCHLORPERAZINE (Unverified  Allergy, Unknown, 8/5/15)


     SULFA (SULFONAMIDE ANTIBIOTICS) (Unverified  Allergy, Unknown, 8/5/15)


Uncoded Allergies:  


     SULFA (Allergy, Unknown, 4/17/16)





Patient History


Past Medical History:  DM, GERD, migraines


Past Surgical History:  none


Pertinent Family History:  none


Social History:  Denies: alcohol use, drug use, smoking


Last Menstrual Period:  4/12/17


Pregnant Now:  No


Immunizations:  UTD


Reviewed Nursing Documentation:  PMH: Agreed, PSxH: Agreed





Nursing Documentation-PMH


Hx Cardiac Problems:  No - autoimmune disease


Hx Pacemaker:  No


Hx Asthma:  No


Hx COPD:  No


Hx Diabetes:  Yes - dm2


Hx Cancer:  No


Hx Dialysis:  Yes


Hx Neurological Problems:  Yes - Migraines


Hx Cerebrovascular Accident:  No


Hx Seizures:  No


Hx Vertigo:  Yes


Hx Dizziness:  Yes


Hx Headaches:  Yes - migraines





Review of Systems


All Other Systems:  negative except mentioned in HPI





Physical Exam





Vital Signs








  Date Time  Temp Pulse Resp B/P Pulse Ox O2 Delivery O2 Flow Rate FiO2


 


6/12/17 12:56 98.1 92 14 106/76 99 Room Air  








Sp02 EP Interpretation:  reviewed, normal


General Appearance:  no apparent distress, alert, GCS 15, non-toxic


Head:  normocephalic, atraumatic


Eyes:  bilateral eye PERRL, bilateral eye normal inspection


ENT:  hearing grossly normal, normal pharynx, no angioedema, normal voice


Neck:  full range of motion, supple/symm/no masses


Respiratory:  chest non-tender, lungs clear, normal breath sounds, speaking 

full sentences


Cardiovascular #1:  regular rate, rhythm, no edema


Cardiovascular #2:  2+ carotid (R), 2+ carotid (L), 2+ radial (R), 2+ radial (L)

, 2+ dorsalis pedis (R), 2+ dorsalis pedis (L)


Gastrointestinal:  normal bowel sounds, non tender, soft, non-distended, no 

guarding, no rebound


Rectal:  deferred


Genitourinary:  normal inspection, no CVA tenderness


Musculoskeletal:  back normal, gait/station normal, normal range of motion, non-

tender


Neurologic:  alert, oriented x3, responsive, motor strength/tone normal, 

sensory intact, speech normal


Psychiatric:  judgement/insight normal, memory normal, mood/affect normal, no 

suicidal/homicidal ideation


Reflexes:  3+ bicep (R), 3+ bicep (L), 3+ tricep (R), 3+ tricep (L), 3+ knee (R)

, 3+ knee (L)


Skin:  normal color, no rash, warm/dry, well hydrated


Lymphatic:  no adenopathy





Medical Decision Making


Diagnostic Impression:  


 Primary Impression:  


 Pancreatitis, chronic


 Qualified Codes:  K86.1 - Other chronic pancreatitis


 Additional Impression:  


 Opiate dependence


 Qualified Codes:  F11.29 - Opioid dependence with unspecified opioid-induced 

disorder


ER Course


Hospital Course 


31-year-old F presents to ED with epigastric pain with N/V. h/o pancreatitis. 

here for blood work





differential diagnosis: gastritis, pancreatitis, opiate dependence





Clinical course


Patient placed on stretcher.  On cardiac monitor.  After initial history and 

physical I ordered labs, IV fluids, Zofran and protonix





Labs - no leukocytosis, no electrolyte abnormalities, LFTs normal





Patient's pain did not resolve.  I agreed to provide her with one dose of pain 

medication here





Upon reassessment, patient states pain has improved





I feel this is a highly complex case requiring extensive working including EKG/

Rhythm strip, Xray/CT/US, Blood/urine lab work, repeat exams while in ED, and 

administration of strong opiates/narcotics for pain control, admission to 

hospital or close patient follow up.  





Diagnosis - chronic pancreatitis, opiate dependence





Stable and discharged to home.  Followup with PMD.  Return to ED if symptoms 

recur or worsen





Labs








Test


  6/12/17


13:05 6/12/17


13:23 6/12/17


14:10


 


White Blood Count


  


  7.5 K/UL


(4.8-10.8) 


 


 


Red Blood Count


  


  3.48 M/UL


(4.20-5.40) 


 


 


Hemoglobin


  


  10.0 G/DL


(12.0-16.0) 


 


 


Hematocrit


  


  30.7 %


(37.0-47.0) 


 


 


Mean Corpuscular Volume  88 FL (80-99)  


 


Mean Corpuscular Hemoglobin


  


  28.8 PG


(27.0-31.0) 


 


 


Mean Corpuscular Hemoglobin


Concent 


  32.6 G/DL


(32.0-36.0) 


 


 


Red Cell Distribution Width


  


  13.2 %


(11.6-14.8) 


 


 


Platelet Count


  


  223 K/UL


(150-450) 


 


 


Mean Platelet Volume


  


  6.4 FL


(6.5-10.1) 


 


 


Neutrophils (%) (Auto)


  


  80.2 %


(45.0-75.0) 


 


 


Lymphocytes (%) (Auto)


  


  14.7 %


(20.0-45.0) 


 


 


Monocytes (%) (Auto)


  


  4.8 %


(1.0-10.0) 


 


 


Eosinophils (%) (Auto)


  


  0.0 %


(0.0-3.0) 


 


 


Basophils (%) (Auto)


  


  0.3 %


(0.0-2.0) 


 


 


Sodium Level


  


  141 mEQ/L


(135-145) 


 


 


Potassium Level


  


  4.5 mEQ/L


(3.4-4.9) 


 


 


Chloride Level


  


  103 mEQ/L


() 


 


 


Carbon Dioxide Level


  


  21 mEQ/L


(20-30) 


 


 


Anion Gap  17 (5-15)  


 


Blood Urea Nitrogen  8 mg/dL (7-23)  


 


Creatinine


  


  0.8 mg/dL


(0.5-0.9) 


 


 


Estimat Glomerular Filtration


Rate 


  > 60 mL/min


(>60) 


 


 


Glucose Level


  


  120 mg/dL


() 


 


 


Calcium Level


  


  9.3 mg/dL


(8.6-10.2) 


 


 


Phosphorus Level


  


  3.2 MG/DL


(2.5-4.9) 


 


 


Iron Level


  


  64 ug/dL


() 


 


 


Total Iron Binding Capacity


  


  297 ug/dL


(250-400) 


 


 


Percent Iron Saturation  22 % (15-50)  


 


Unsaturated Iron Binding


  


  233 ug/dL


(112-346) 


 


 


Total Bilirubin


  


  0.5 mg/dL


(0.0-1.2) 


 


 


Aspartate Amino Transf


(AST/SGOT) 


  18 U/L (5-40) 


  


 


 


Alanine Aminotransferase


(ALT/SGPT) 


  14 U/L (3-33) 


  


 


 


Alkaline Phosphatase


  


  54 U/L


() 


 


 


Total Protein


  


  6.5 g/dL


(6.6-8.7) 


 


 


Albumin


  


  4.4 g/dL


(3.5-5.2) 


 


 


Globulin  2.1 g/dL  


 


Albumin/Globulin Ratio  2.0 (1.0-2.7)  


 


Lipase  73 U/L (< 60)  


 


Urine Color   Pale yellow 


 


Urine Appearance   Clear 


 


Urine pH   6 (4.5-8.0) 


 


Urine Specific Gravity


  


  


  1.015


(1.005-1.035)


 


Urine Protein


  


  


  Negative


(NEGATIVE)


 


Urine Glucose (UA)


  


  


  Negative


(NEGATIVE)


 


Urine Ketones


  


  


  Negative


(NEGATIVE)


 


Urine Occult Blood   1+ (NEGATIVE) 


 


Urine Nitrite


  


  


  Negative


(NEGATIVE)


 


Urine Bilirubin


  


  


  Negative


(NEGATIVE)


 


Urine Urobilinogen


  


  


  Normal MG/DL


(0.0-1.0)


 


Urine Leukocyte Esterase


  


  


  Negative


(NEGATIVE)


 


Urine RBC


  


  


  0-2 /HPF (0 -


2)


 


Urine WBC


  


  


  0-2 /HPF (0 -


2)


 


Urine Squamous Epithelial


Cells 


  


  Few /LPF


(NONE/OCC)


 


Urine Bacteria


  


  


  Few /HPF


(NONE)











Last Vital Signs








  Date Time  Temp Pulse Resp B/P Pulse Ox O2 Delivery O2 Flow Rate FiO2


 


6/12/17 15:16  78 16 120/70 98 Room Air  


 


6/12/17 13:49 98.1       








Status:  improved


Disposition:  HOME, SELF-CARE


Condition:  Stable


Referrals:  


NOT CHOSEN IPA/MD,REFERRING (PCP)


Patient Instructions:  Lipase Test











YULY WALKER M.D. Jun 13, 2017 07:11

## 2017-06-16 LAB — VITAMIN D 25-OH TOTAL: 8.6 NG/ML

## 2017-06-30 ENCOUNTER — HOSPITAL ENCOUNTER (EMERGENCY)
Dept: HOSPITAL 72 - EMR | Age: 32
LOS: 1 days | Discharge: HOME | End: 2017-07-01
Payer: MEDICARE

## 2017-06-30 VITALS — SYSTOLIC BLOOD PRESSURE: 127 MMHG | DIASTOLIC BLOOD PRESSURE: 85 MMHG

## 2017-06-30 VITALS — HEIGHT: 65 IN | WEIGHT: 110 LBS | BODY MASS INDEX: 18.33 KG/M2

## 2017-06-30 DIAGNOSIS — Z88.8: ICD-10-CM

## 2017-06-30 DIAGNOSIS — Z88.2: ICD-10-CM

## 2017-06-30 DIAGNOSIS — Z88.0: ICD-10-CM

## 2017-06-30 DIAGNOSIS — E11.9: ICD-10-CM

## 2017-06-30 DIAGNOSIS — F11.20: ICD-10-CM

## 2017-06-30 DIAGNOSIS — Z91.040: ICD-10-CM

## 2017-06-30 DIAGNOSIS — R51: ICD-10-CM

## 2017-06-30 DIAGNOSIS — Z88.6: ICD-10-CM

## 2017-06-30 DIAGNOSIS — R10.9: Primary | ICD-10-CM

## 2017-06-30 DIAGNOSIS — G89.29: ICD-10-CM

## 2017-06-30 LAB
APPEARANCE UR: CLEAR
KETONES UR QL STRIP: NEGATIVE
LEUKOCYTE ESTERASE UR QL STRIP: (no result)
NITRITE UR QL STRIP: NEGATIVE
PH UR STRIP: 8 [PH] (ref 4.5–8)
PROT UR QL STRIP: NEGATIVE
SP GR UR STRIP: 1.01 (ref 1–1.03)
UROBILINOGEN UR-MCNC: NORMAL MG/DL (ref 0–1)

## 2017-06-30 PROCEDURE — 99282 EMERGENCY DEPT VISIT SF MDM: CPT

## 2017-06-30 PROCEDURE — 81003 URINALYSIS AUTO W/O SCOPE: CPT

## 2017-06-30 NOTE — EMERGENCY ROOM REPORT
History of Present Illness


General


Chief Complaint:  General Complaint


Source:  Patient





Present Illness


HPI


Is a 31-year-old female who stated that she has pancreatic insufficiency based 

on testing.  She also said her kidneys shutting down and her liver failing.  

She has been here multiple times for the same type of abdominal pain and 

migraine complaint.  She said that her last move all these issues.  She was 

here on  and labs are normal.  Urine showed no ketones or routine.  Her 

liver enzymes are normal.  Her kidney functions are normal.  Pt complaining of 

migraine for ongoing for 7 days.  No photophobia.  Said she can't keep anything 

down.  Said she needs her pain medication.


Allergies:  


Coded Allergies:  


     ACETAMINOPHEN (Verified  Allergy, Unknown, 3/14/17)


     ASPIRIN (Unverified  Allergy, Unknown, 3/14/17)


     HALOPERIDOL (Verified  Allergy, Unknown, 3/14/17)


     LATEX (Verified  Allergy, Unknown, 3/14/17)


     METOCLOPRAMIDE (Unverified  Allergy, Unknown, 3/14/17)


     PENICILLIN (Unverified  Allergy, Unknown, 3/14/17)


     PROCHLORPERAZINE (Unverified  Allergy, Unknown, 8/5/15)


     SULFA (SULFONAMIDE ANTIBIOTICS) (Unverified  Allergy, Unknown, 8/5/15)


Uncoded Allergies:  


     SULFA (Allergy, Unknown, 16)





Patient History


Past Medical History:  see triage record, old chart reviewed


Past Surgical History:  other


Pertinent Family History:  none


Social History:  Denies: smoking


Last Menstrual Period:  17


Pregnant Now:  No


:  0


Para:  0


Immunizations:  other


Reviewed Nursing Documentation:  PMH: Agreed, PSxH: Agreed





Nursing Documentation-PM


Hx Pacemaker:  No


Hx Asthma:  No


Hx COPD:  No


Hx Diabetes:  Yes - dm2


Hx Cancer:  No


Hx Dialysis:  Yes


Hx Cerebrovascular Accident:  No


Hx Seizures:  No


Hx Vertigo:  Yes


Hx Dizziness:  Yes


Hx Headaches:  Yes - migraines





Review of Systems


Eye:  Denies: blurred vision, eye pain


ENT:  Denies: ear pain, nose congestion, throat swelling


Respiratory:  Denies: cough, shortness of breath


Cardiovascular:  Denies: chest pain, palpitations


Gastrointestinal:  Denies: abdominal pain, diarrhea, nausea, vomiting


Musculoskeletal:  Denies: back pain, joint pain


Skin:  Denies: rash


Neurological:  Reports: headache, Denies: numbness


Endocrine:  Denies: increased thirst, increased urine


Hematologic/Lymphatic:  Denies: easy bruising


All Other Systems:  negative except mentioned in HPI





Physical Exam





Vital Signs








  Date Time  Temp Pulse Resp B/P Pulse Ox O2 Delivery O2 Flow Rate FiO2


 


17 22:48 98.1 94 16 127/85 100 Room Air  





vitals normal


Sp02 EP Interpretation:  reviewed, normal


General Appearance:  well appearing, no apparent distress, alert


Head:  normocephalic, atraumatic


Eyes:  bilateral eye EOMI, bilateral eye PERRL


ENT:  hearing grossly normal, normal pharynx


Neck:  full range of motion, supple, no meningismus


Respiratory:  chest non-tender, lungs clear, normal breath sounds


Cardiovascular #1:  regular rate, rhythm, no murmur


Gastrointestinal:  normal bowel sounds, non tender, no mass, no organomegaly, 

no bruit, non-distended


Musculoskeletal:  back normal, gait/station normal, normal range of motion


Psychiatric:  other - Patient is extremely histrionic


Skin:  warm/dry





Medical Decision Making


Diagnostic Impression:  


 Primary Impression:  


 Abdominal pain


 Qualified Codes:  R10.84 - Generalized abdominal pain


 Additional Impressions:  


 Opiate dependence


 Qualified Codes:  F11.20 - Opioid dependence, uncomplicated


 Headache disorder


ER Course


Patient presents with chronic pain complaint.  Based on her labs and CT scan, 

there is nothing remarkable.  She claimed that she can take Tylenol because of 

her liver and pancreatic problem.  Again labs does not show this.  Urine is not 

dark.  She is obviously hydrating well.  We'll discharge home.  She was 

extremely verbally abusive to me and staff. her issues may be more psychogenic 

in opioid dependency.





Last Vital Signs








  Date Time  Temp Pulse Resp B/P Pulse Ox O2 Delivery O2 Flow Rate FiO2


 


17 22:48 98.1 94 16 127/85 100 Room Air  








Status:  unchanged


Disposition:  HOME, SELF-CARE


Condition:  Stable





Additional Instructions:  


followup with your Dr. in 7 days.  Return if symptom worsen.











SHERIN CONKLIN M.D. 2017 23:22

## 2017-07-01 LAB
BACTERIA #/AREA URNS HPF: (no result) /HPF
RBC #/AREA URNS HPF: (no result) /HPF (ref 0–2)
SQUAMOUS #/AREA URNS LPF: (no result) /LPF
WBC #/AREA URNS HPF: (no result) /HPF (ref 0–2)

## 2017-07-09 ENCOUNTER — HOSPITAL ENCOUNTER (EMERGENCY)
Dept: HOSPITAL 54 - ER | Age: 32
Discharge: HOME | End: 2017-07-09
Payer: MEDICARE

## 2017-07-09 VITALS — SYSTOLIC BLOOD PRESSURE: 112 MMHG | DIASTOLIC BLOOD PRESSURE: 71 MMHG

## 2017-07-09 VITALS — WEIGHT: 120 LBS | HEIGHT: 65 IN | BODY MASS INDEX: 19.99 KG/M2

## 2017-07-09 DIAGNOSIS — G89.29: ICD-10-CM

## 2017-07-09 DIAGNOSIS — K85.90: ICD-10-CM

## 2017-07-09 DIAGNOSIS — R10.13: Primary | ICD-10-CM

## 2017-07-09 DIAGNOSIS — Z90.49: ICD-10-CM

## 2017-07-09 DIAGNOSIS — Z88.5: ICD-10-CM

## 2017-07-09 DIAGNOSIS — Z88.8: ICD-10-CM

## 2017-07-09 DIAGNOSIS — D64.9: ICD-10-CM

## 2017-07-09 LAB
ALBUMIN SERPL BCP-MCNC: 3.6 G/DL (ref 3.4–5)
ALP SERPL-CCNC: 50 U/L (ref 46–116)
ALT SERPL W P-5'-P-CCNC: 18 U/L (ref 12–78)
AST SERPL W P-5'-P-CCNC: 15 U/L (ref 15–37)
BASOPHILS # BLD AUTO: 0 /CMM (ref 0–0.2)
BASOPHILS NFR BLD AUTO: 0.5 % (ref 0–2)
BILIRUB DIRECT SERPL-MCNC: 0.1 MG/DL (ref 0–0.2)
BILIRUB SERPL-MCNC: 0.2 MG/DL (ref 0.2–1)
BUN SERPL-MCNC: 11 MG/DL (ref 7–18)
CALCIUM SERPL-MCNC: 8.5 MG/DL (ref 8.5–10.1)
CHLORIDE SERPL-SCNC: 109 MMOL/L (ref 98–107)
CO2 SERPL-SCNC: 28 MMOL/L (ref 21–32)
CREAT SERPL-MCNC: 0.6 MG/DL (ref 0.6–1.3)
EOSINOPHIL # BLD AUTO: 0.2 /CMM (ref 0–0.7)
EOSINOPHIL NFR BLD AUTO: 3.1 % (ref 0–6)
GLUCOSE SERPL-MCNC: 101 MG/DL (ref 74–106)
HCT VFR BLD AUTO: 27 % (ref 33–45)
HGB BLD-MCNC: 8.9 G/DL (ref 11.5–14.8)
LIPASE SERPL-CCNC: 405 U/L (ref 73–393)
LYMPHOCYTES NFR BLD AUTO: 2.6 /CMM (ref 0.8–4.8)
LYMPHOCYTES NFR BLD AUTO: 48.5 % (ref 20–44)
MCH RBC QN AUTO: 28 PG (ref 26–33)
MCHC RBC AUTO-ENTMCNC: 33 G/DL (ref 31–36)
MCV RBC AUTO: 85 FL (ref 82–100)
MONOCYTES NFR BLD AUTO: 0.4 /CMM (ref 0.1–1.3)
MONOCYTES NFR BLD AUTO: 7.8 % (ref 2–12)
NEUTROPHILS # BLD AUTO: 2.1 /CMM (ref 1.8–8.9)
NEUTROPHILS NFR BLD AUTO: 40.1 % (ref 43–81)
PLATELET # BLD AUTO: 289 /CMM (ref 150–450)
POTASSIUM SERPL-SCNC: 4 MMOL/L (ref 3.5–5.1)
PROT SERPL-MCNC: 6.3 G/DL (ref 6.4–8.2)
RBC # BLD AUTO: 3.19 MIL/UL (ref 4–5.2)
RDW COEFFICIENT OF VARIATION: 15.1 (ref 11.5–15)
SODIUM SERPL-SCNC: 143 MMOL/L (ref 136–145)
WBC NRBC COR # BLD AUTO: 5.3 K/UL (ref 4.3–11)

## 2017-07-09 PROCEDURE — 99284 EMERGENCY DEPT VISIT MOD MDM: CPT

## 2017-07-09 PROCEDURE — 96374 THER/PROPH/DIAG INJ IV PUSH: CPT

## 2017-07-09 PROCEDURE — 96376 TX/PRO/DX INJ SAME DRUG ADON: CPT

## 2017-07-09 PROCEDURE — 80076 HEPATIC FUNCTION PANEL: CPT

## 2017-07-09 PROCEDURE — Z7610: HCPCS

## 2017-07-09 PROCEDURE — 83690 ASSAY OF LIPASE: CPT

## 2017-07-09 PROCEDURE — 80048 BASIC METABOLIC PNL TOTAL CA: CPT

## 2017-07-09 PROCEDURE — 36415 COLL VENOUS BLD VENIPUNCTURE: CPT

## 2017-07-09 PROCEDURE — 85025 COMPLETE CBC W/AUTO DIFF WBC: CPT

## 2017-07-09 PROCEDURE — 96375 TX/PRO/DX INJ NEW DRUG ADDON: CPT

## 2017-07-09 PROCEDURE — A4606 OXYGEN PROBE USED W OXIMETER: HCPCS

## 2017-07-09 NOTE — NUR
TO BED 07 A 33 YO FEMALE BIBSELF, C/O EPIGASTRIC PAIN SINCE 10PM AFTER EATING A 
SALAD WITH FISH, WITH N/V. VSS. NO S/S OF ACUTE DISTRESS. BREATHING EVEN AND 
UNLABORED. NONDIAPHORETIC. GOWNED. COMFORT MEASURES RENDERED. AWAITING FOR ER 
MD CHAN.

## 2017-07-09 NOTE — NUR
Patient discharged to home in stable condition. Written and verbal after care 
instructions given. Patient verbalizes understanding of instruction. Patient is 
ambulatory with a steady gait, accompanied by familiy. VSS. NAD noted.

## 2017-07-09 NOTE — NUR
PATIENT REPORTED STILL FEELING NAUSEOUS AND THE EPIGASTRIC PAIN REOCCUR AT 
9/10. DR AGARWAL  IS NOTIFIED AND HE SAID, "IM GOING TO GO SEE HER."

## 2017-08-12 ENCOUNTER — HOSPITAL ENCOUNTER (EMERGENCY)
Dept: HOSPITAL 54 - ER | Age: 32
LOS: 1 days | Discharge: HOME | End: 2017-08-13
Payer: MEDICARE

## 2017-08-12 VITALS — HEIGHT: 65 IN | BODY MASS INDEX: 18.33 KG/M2 | WEIGHT: 110 LBS

## 2017-08-12 DIAGNOSIS — G43.909: ICD-10-CM

## 2017-08-12 DIAGNOSIS — Z88.8: ICD-10-CM

## 2017-08-12 DIAGNOSIS — Z90.49: ICD-10-CM

## 2017-08-12 DIAGNOSIS — R10.13: Primary | ICD-10-CM

## 2017-08-12 DIAGNOSIS — R11.10: ICD-10-CM

## 2017-08-12 DIAGNOSIS — G89.4: ICD-10-CM

## 2017-08-12 DIAGNOSIS — Z90.89: ICD-10-CM

## 2017-08-12 DIAGNOSIS — K21.9: ICD-10-CM

## 2017-08-12 DIAGNOSIS — Z76.5: ICD-10-CM

## 2017-08-12 PROCEDURE — 96361 HYDRATE IV INFUSION ADD-ON: CPT

## 2017-08-12 PROCEDURE — 96375 TX/PRO/DX INJ NEW DRUG ADDON: CPT

## 2017-08-12 PROCEDURE — 96374 THER/PROPH/DIAG INJ IV PUSH: CPT

## 2017-08-12 PROCEDURE — A4606 OXYGEN PROBE USED W OXIMETER: HCPCS

## 2017-08-12 PROCEDURE — 99284 EMERGENCY DEPT VISIT MOD MDM: CPT

## 2017-08-12 PROCEDURE — Z7610: HCPCS

## 2017-08-13 VITALS — DIASTOLIC BLOOD PRESSURE: 50 MMHG | SYSTOLIC BLOOD PRESSURE: 98 MMHG

## 2017-08-13 NOTE — NUR
PT. WAS ADM. PEPCID 20MG IVP. PT. HAS "DRY HEAVES" STILL. 0.9%NS BOLUS INFUSED.

-------------------------------------------------------------------------------

Addendum: 08/13/17 at 0154 by MEGHAN

-------------------------------------------------------------------------------

MD AWARE ONLY HALF THE BAG INFUSED. PT. CAN BE DC'D.

## 2017-08-13 NOTE — NUR
PT. ADM. DILAUDID 2MG SLOW IVP & BENADRYL 50MG IVP FOR COMFORT. PT. IS MAKING 
"BELCHING" NOISES FROM THROAT. PT'S MOTHER AT BS.

## 2017-08-13 NOTE — NUR
Patient discharged to home in stable condition. Written and verbal after care 
instructions given. Patient verbalizes understanding of instruction.  
ambulatory with a steady gait. pt has erick picc line intact and patent. no s/s 
infection or infiltration. instructed pt not to drive. pt verbalize 
understanding.

## 2017-08-14 ENCOUNTER — HOSPITAL ENCOUNTER (EMERGENCY)
Dept: HOSPITAL 72 - EMR | Age: 32
Discharge: HOME | End: 2017-08-14
Payer: MEDICARE

## 2017-08-14 VITALS — SYSTOLIC BLOOD PRESSURE: 120 MMHG | DIASTOLIC BLOOD PRESSURE: 72 MMHG

## 2017-08-14 VITALS — DIASTOLIC BLOOD PRESSURE: 70 MMHG | SYSTOLIC BLOOD PRESSURE: 115 MMHG

## 2017-08-14 VITALS — BODY MASS INDEX: 18.33 KG/M2 | HEIGHT: 65 IN | WEIGHT: 110 LBS

## 2017-08-14 VITALS — DIASTOLIC BLOOD PRESSURE: 72 MMHG | SYSTOLIC BLOOD PRESSURE: 120 MMHG

## 2017-08-14 DIAGNOSIS — Z91.040: ICD-10-CM

## 2017-08-14 DIAGNOSIS — Z87.19: ICD-10-CM

## 2017-08-14 DIAGNOSIS — Z88.2: ICD-10-CM

## 2017-08-14 DIAGNOSIS — R10.9: Primary | ICD-10-CM

## 2017-08-14 DIAGNOSIS — Z88.8: ICD-10-CM

## 2017-08-14 DIAGNOSIS — Z88.6: ICD-10-CM

## 2017-08-14 DIAGNOSIS — Z88.0: ICD-10-CM

## 2017-08-14 DIAGNOSIS — E11.9: ICD-10-CM

## 2017-08-14 LAB
ALBUMIN/GLOB SERPL: 1.6 {RATIO} (ref 1–2.7)
ALT SERPL-CCNC: 23 U/L (ref 3–33)
ANION GAP SERPL CALC-SCNC: 11 MMOL/L (ref 5–15)
AST SERPL-CCNC: 31 U/L (ref 5–40)
BASOPHILS NFR BLD AUTO: 0.5 % (ref 0–2)
CALCIUM SERPL-MCNC: 8.9 MG/DL (ref 8.6–10.2)
CHLORIDE SERPL-SCNC: 101 MEQ/L (ref 98–107)
CO2 SERPL-SCNC: 25 MEQ/L (ref 20–30)
CREAT SERPL-MCNC: 0.7 MG/DL (ref 0.5–0.9)
EOSINOPHIL NFR BLD AUTO: 0.9 % (ref 0–3)
ERYTHROCYTE [DISTWIDTH] IN BLOOD BY AUTOMATED COUNT: 13.8 % (ref 11.6–14.8)
GFR SERPLBLD BASED ON 1.73 SQ M-ARVRAT: > 60 ML/MIN (ref 60–?)
GLOBULIN SER-MCNC: 2.4 G/DL
HEMOLYSIS: 3
LIPASE SERPL-CCNC: 33 U/L (ref ?–60)
LYMPHOCYTES NFR BLD AUTO: 33.9 % (ref 20–45)
MCH RBC QN AUTO: 28.9 PG (ref 27–31)
MCHC RBC AUTO-ENTMCNC: 33.3 G/DL (ref 32–36)
MCV RBC AUTO: 87 FL (ref 80–99)
MONOCYTES NFR BLD AUTO: 11.9 % (ref 1–10)
NEUTROPHILS NFR BLD AUTO: 52.7 % (ref 45–75)
PLATELET # BLD: 104 K/UL (ref 150–450)
PMV BLD AUTO: 5.9 FL (ref 6.5–10.1)
POTASSIUM SERPL-SCNC: 3.7 MEQ/L (ref 3.4–4.9)
PROT SERPL-MCNC: 6.4 G/DL (ref 6.6–8.7)
RBC # BLD AUTO: 2.9 M/UL (ref 4.2–5.4)
SODIUM SERPL-SCNC: 137 MEQ/L (ref 135–145)
WBC # BLD AUTO: 3.1 K/UL (ref 4.8–10.8)

## 2017-08-14 PROCEDURE — 99284 EMERGENCY DEPT VISIT MOD MDM: CPT

## 2017-08-14 PROCEDURE — 96375 TX/PRO/DX INJ NEW DRUG ADDON: CPT

## 2017-08-14 PROCEDURE — 36415 COLL VENOUS BLD VENIPUNCTURE: CPT

## 2017-08-14 PROCEDURE — 96360 HYDRATION IV INFUSION INIT: CPT

## 2017-08-14 PROCEDURE — 85025 COMPLETE CBC W/AUTO DIFF WBC: CPT

## 2017-08-14 PROCEDURE — 80053 COMPREHEN METABOLIC PANEL: CPT

## 2017-08-14 PROCEDURE — 83690 ASSAY OF LIPASE: CPT

## 2017-08-15 NOTE — EMERGENCY ROOM REPORT
History of Present Illness


General


Chief Complaint:  Abdominal Pain


Source:  Patient





Present Illness


HPI


The patient is a 32-year-old female with a stated history of chronic 

pancreatitis And diabetes presenting for abdominal pain.  Pain is a 10 out of 

10 dull ache to the mid-upper abdomen and does not radiate.  No known provoking 

relieving factors.  Pain has been ongoing but has worsened today.  No known 

reason.  She states that she has been unable to obtain appointments with pain 

management and her primary doctor will not treat her pain.She also admits to 

nausea and vomiting.  She denies other symptoms including fever, chills, 

diarrhea, constipation, rash, headache, shortness of breath.


Allergies:  


Coded Allergies:  


     ACETAMINOPHEN (Verified  Allergy, Unknown, 3/14/17)


     ASPIRIN (Unverified  Allergy, Unknown, 3/14/17)


     HALOPERIDOL (Verified  Allergy, Unknown, 3/14/17)


     LATEX (Verified  Allergy, Unknown, 3/14/17)


     METOCLOPRAMIDE (Unverified  Allergy, Unknown, 3/14/17)


     PENICILLIN (Unverified  Allergy, Unknown, 3/14/17)


     PROCHLORPERAZINE (Unverified  Allergy, Unknown, 8/5/15)


     SULFA (SULFONAMIDE ANTIBIOTICS) (Unverified  Allergy, Unknown, 8/5/15)


Uncoded Allergies:  


     SULFA (Allergy, Unknown, 4/17/16)





Patient History


Past Medical History:  see triage record


Pertinent Family History:  none


Last Menstrual Period:  bc


Pregnant Now:  No





Nursing Documentation-PMH


Past Medical History:  No History, Except For


Hx Pacemaker:  No


Hx Asthma:  No


Hx COPD:  No


Hx Diabetes:  Yes - dm2


Hx Cancer:  No


Hx Gastrointestinal Problems:  Yes - Pancreatic insufficiency


Hx Dialysis:  Yes


Hx Cerebrovascular Accident:  No


Hx Seizures:  No


Hx Vertigo:  Yes


Hx Dizziness:  Yes


Hx Headaches:  Yes - migraines





Review of Systems


All Other Systems:  negative except mentioned in HPI





Physical Exam





Vital Signs








  Date Time  Temp Pulse Resp B/P Pulse Ox O2 Delivery O2 Flow Rate FiO2


 


8/14/17 18:36 98.1 114 16 113/69 98 Room Air  








Sp02 EP Interpretation:  reviewed, normal


General Appearance:  no apparent distress, alert, GCS 15, non-toxic


Head:  normocephalic, atraumatic


Eyes:  bilateral eye PERRL, bilateral eye normal inspection


ENT:  hearing grossly normal, normal pharynx, no angioedema, normal voice


Neck:  full range of motion, supple/symm/no masses


Respiratory:  chest non-tender, lungs clear, normal breath sounds, speaking 

full sentences


Cardiovascular #1:  regular rate, rhythm, no edema


Gastrointestinal:  normal bowel sounds, soft, no mass, tenderness - diffuse


Genitourinary:  normal inspection, no CVA tenderness


Musculoskeletal:  back normal, gait/station normal, normal range of motion, non-

tender


Neurologic:  alert, oriented x3, responsive, motor strength/tone normal, 

sensory intact, speech normal


Psychiatric:  judgement/insight normal, memory normal, mood/affect normal, no 

suicidal/homicidal ideation


Skin:  normal color, no rash, warm/dry, well hydrated





Medical Decision Making


PA Attestation


Dr. Wheeler is my supervising physician. Patient management was discussed 

with my supervising physician


Diagnostic Impression:  


 Primary Impression:  


 Abdominal pain


 Qualified Codes:  R10.9 - Unspecified abdominal pain


ER Course


The patient is a 31 yo F with history of chronic pancreatitis presenting for 

abdominal pain.





DDx considered but not limited to: pancreatitis, gastritis, constipation, 

opiate dependence, among others.





PE:vitals WNL. NAD


Skin is warm and dry. 


Abd is diffusely tender to soft palpation. Soft. Non distended. 


RRR.


Lungs CTA bilat





Labwork significant for anemia. Otherwise unremarkable. 


No leukocytosis. 


Lipase WNL





The patient is given pain medication and feels better. Pain has decreased yet 

she is requesting more so the pain will not return when she goes home. I 

declined and patient became upset. 





She states she has stopped taking iron supplementation due to constipation. She 

states she also used to receive IV transfusions but has not been able to 

recently because she states her doctor did not want to anymore. 





She will be VA'ed home and needs to FU with PMD. 





She was told she cannot keep coming to the ER just for pain treatment. 





As a last request, she has asked for knee sleeves due to feeling of weakness. 

Prescription given.





Labs








Test


  8/14/17


19:20


 


White Blood Count


  3.1 K/UL


(4.8-10.8)


 


Red Blood Count


  2.90 M/UL


(4.20-5.40)


 


Hemoglobin


  8.4 G/DL


(12.0-16.0)


 


Hematocrit


  25.2 %


(37.0-47.0)


 


Mean Corpuscular Volume 87 FL (80-99) 


 


Mean Corpuscular Hemoglobin


  28.9 PG


(27.0-31.0)


 


Mean Corpuscular Hemoglobin


Concent 33.3 G/DL


(32.0-36.0)


 


Red Cell Distribution Width


  13.8 %


(11.6-14.8)


 


Platelet Count


  104 K/UL


(150-450)


 


Mean Platelet Volume


  5.9 FL


(6.5-10.1)


 


Neutrophils (%) (Auto)


  52.7 %


(45.0-75.0)


 


Lymphocytes (%) (Auto)


  33.9 %


(20.0-45.0)


 


Monocytes (%) (Auto)


  11.9 %


(1.0-10.0)


 


Eosinophils (%) (Auto)


  0.9 %


(0.0-3.0)


 


Basophils (%) (Auto)


  0.5 %


(0.0-2.0)


 


Sodium Level


  137 mEQ/L


(135-145)


 


Potassium Level


  3.7 mEQ/L


(3.4-4.9)


 


Chloride Level


  101 mEQ/L


()


 


Carbon Dioxide Level


  25 mEQ/L


(20-30)


 


Anion Gap 11 (5-15) 


 


Blood Urea Nitrogen 8 mg/dL (7-23) 


 


Creatinine


  0.7 mg/dL


(0.5-0.9)


 


Estimat Glomerular Filtration


Rate > 60 mL/min


(>60)


 


Glucose Level


  112 mg/dL


()


 


Calcium Level


  8.9 mg/dL


(8.6-10.2)


 


Total Bilirubin


  0.2 mg/dL


(0.0-1.2)


 


Aspartate Amino Transf


(AST/SGOT) 31 U/L (5-40) 


 


 


Alanine Aminotransferase


(ALT/SGPT) 23 U/L (3-33) 


 


 


Alkaline Phosphatase


  46 U/L


()


 


Total Protein


  6.4 g/dL


(6.6-8.7)


 


Albumin


  4.0 g/dL


(3.5-5.2)


 


Globulin 2.4 g/dL 


 


Albumin/Globulin Ratio 1.6 (1.0-2.7) 


 


Lipase 33 U/L (< 60) 








Lab Results Impression


Anemia. Otherwise unremarkable. 


Lipase WNL





Last Vital Signs








  Date Time  Temp Pulse Resp B/P Pulse Ox O2 Delivery O2 Flow Rate FiO2


 


8/14/17 21:40 98.2 81 16 120/72 99 Room Air  








Status:  improved


Disposition:  HOME, SELF-CARE


Condition:  Improved


Scripts


Leg Brace (KNEE STABILIZER) 1 Each Each


1 EACH , #2


   Prov: MUNIR CHAPIN         8/14/17


Patient Instructions:  Abdominal Pain, Adult





Additional Instructions:  


I discussed my findings with the patient. All questions and concerns have been 

answered. Treatment and medication compliance have been addressed. I advised 

the patient that they need to follow up with PMD in 3-5 days. Return to ED if 

symptoms worsen, new symptoms arise, or if needed for any reason. Patient 

verbalized understanding of discharge instructions.











MUNIR CHAPIN Aug 15, 2017 22:07

## 2017-09-05 ENCOUNTER — HOSPITAL ENCOUNTER (EMERGENCY)
Dept: HOSPITAL 72 - EMR | Age: 32
LOS: 1 days | Discharge: LEFT BEFORE DISCHARGE | End: 2017-09-06
Payer: MEDICARE

## 2017-09-05 VITALS — WEIGHT: 105 LBS | HEIGHT: 65 IN | BODY MASS INDEX: 17.49 KG/M2

## 2017-09-05 DIAGNOSIS — E11.9: ICD-10-CM

## 2017-09-05 DIAGNOSIS — K86.1: ICD-10-CM

## 2017-09-05 DIAGNOSIS — R10.9: Primary | ICD-10-CM

## 2017-09-05 DIAGNOSIS — Z88.2: ICD-10-CM

## 2017-09-05 DIAGNOSIS — Z88.6: ICD-10-CM

## 2017-09-05 DIAGNOSIS — Z88.8: ICD-10-CM

## 2017-09-05 DIAGNOSIS — Z88.0: ICD-10-CM

## 2017-09-05 DIAGNOSIS — F91.9: ICD-10-CM

## 2017-09-05 PROCEDURE — 99284 EMERGENCY DEPT VISIT MOD MDM: CPT

## 2017-09-05 PROCEDURE — 83690 ASSAY OF LIPASE: CPT

## 2017-09-05 PROCEDURE — 86850 RBC ANTIBODY SCREEN: CPT

## 2017-09-05 PROCEDURE — 80053 COMPREHEN METABOLIC PANEL: CPT

## 2017-09-05 PROCEDURE — 36415 COLL VENOUS BLD VENIPUNCTURE: CPT

## 2017-09-05 PROCEDURE — 86901 BLOOD TYPING SEROLOGIC RH(D): CPT

## 2017-09-05 PROCEDURE — 86900 BLOOD TYPING SEROLOGIC ABO: CPT

## 2017-09-05 PROCEDURE — 85025 COMPLETE CBC W/AUTO DIFF WBC: CPT

## 2017-09-06 VITALS — DIASTOLIC BLOOD PRESSURE: 59 MMHG | SYSTOLIC BLOOD PRESSURE: 87 MMHG

## 2017-09-06 VITALS — SYSTOLIC BLOOD PRESSURE: 87 MMHG | DIASTOLIC BLOOD PRESSURE: 59 MMHG

## 2017-09-06 LAB
ALBUMIN/GLOB SERPL: 1.4 {RATIO} (ref 1–2.7)
ALT SERPL-CCNC: 12 U/L (ref 3–33)
ANION GAP SERPL CALC-SCNC: 11 MMOL/L (ref 5–15)
AST SERPL-CCNC: 17 U/L (ref 5–40)
BASOPHILS NFR BLD AUTO: 0.6 % (ref 0–2)
CALCIUM SERPL-MCNC: 8.8 MG/DL (ref 8.6–10.2)
CHLORIDE SERPL-SCNC: 104 MEQ/L (ref 98–107)
CO2 SERPL-SCNC: 26 MEQ/L (ref 20–30)
CREAT SERPL-MCNC: 0.7 MG/DL (ref 0.5–0.9)
EOSINOPHIL NFR BLD AUTO: 1.5 % (ref 0–3)
ERYTHROCYTE [DISTWIDTH] IN BLOOD BY AUTOMATED COUNT: 16.5 % (ref 11.6–14.8)
GFR SERPLBLD BASED ON 1.73 SQ M-ARVRAT: > 60 ML/MIN (ref 60–?)
GLOBULIN SER-MCNC: 2.5 G/DL
HEMOLYSIS: 2
LIPASE SERPL-CCNC: 123 U/L (ref ?–60)
LYMPHOCYTES NFR BLD AUTO: 23.6 % (ref 20–45)
MCH RBC QN AUTO: 28.4 PG (ref 27–31)
MCHC RBC AUTO-ENTMCNC: 32.3 G/DL (ref 32–36)
MCV RBC AUTO: 88 FL (ref 80–99)
MONOCYTES NFR BLD AUTO: 4.6 % (ref 1–10)
NEUTROPHILS NFR BLD AUTO: 69.7 % (ref 45–75)
PLATELET # BLD: 293 K/UL (ref 150–450)
PMV BLD AUTO: 6 FL (ref 6.5–10.1)
POTASSIUM SERPL-SCNC: 4 MEQ/L (ref 3.4–4.9)
PROT SERPL-MCNC: 6.2 G/DL (ref 6.6–8.7)
RBC # BLD AUTO: 3.48 M/UL (ref 4.2–5.4)
SODIUM SERPL-SCNC: 141 MEQ/L (ref 135–145)
WBC # BLD AUTO: 9 K/UL (ref 4.8–10.8)
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
## 2017-09-09 ENCOUNTER — HOSPITAL ENCOUNTER (EMERGENCY)
Dept: HOSPITAL 54 - ER | Age: 32
LOS: 1 days | Discharge: HOME | End: 2017-09-10
Payer: MEDICARE

## 2017-09-09 VITALS — HEIGHT: 65 IN | BODY MASS INDEX: 19.99 KG/M2 | WEIGHT: 120 LBS

## 2017-09-09 DIAGNOSIS — Z91.040: ICD-10-CM

## 2017-09-09 DIAGNOSIS — G89.4: Primary | ICD-10-CM

## 2017-09-09 DIAGNOSIS — Z90.49: ICD-10-CM

## 2017-09-09 DIAGNOSIS — Z76.5: ICD-10-CM

## 2017-09-09 DIAGNOSIS — Z88.8: ICD-10-CM

## 2017-09-09 DIAGNOSIS — Z88.0: ICD-10-CM

## 2017-09-09 DIAGNOSIS — G43.909: ICD-10-CM

## 2017-09-09 DIAGNOSIS — Z98.890: ICD-10-CM

## 2017-09-09 DIAGNOSIS — Z90.89: ICD-10-CM

## 2017-09-09 DIAGNOSIS — K21.9: ICD-10-CM

## 2017-09-09 DIAGNOSIS — Z88.6: ICD-10-CM

## 2017-09-09 PROCEDURE — Z7610: HCPCS

## 2017-09-09 PROCEDURE — A4606 OXYGEN PROBE USED W OXIMETER: HCPCS

## 2017-09-09 PROCEDURE — Z7502: HCPCS

## 2017-09-09 NOTE — NUR
PT BIBSELF C/O LEFT NECK AND SHOULDER PAIN. PT AOX3 RR EVEN AND UNLABORED. NO 
SOB NOTED. NAD NOTED. NO NVD AT THIS TIME. PT GOWNED WAITING FOR MD CHAN.

## 2017-09-10 VITALS — DIASTOLIC BLOOD PRESSURE: 87 MMHG | SYSTOLIC BLOOD PRESSURE: 126 MMHG

## 2017-09-10 NOTE — NUR
Patient discharged to home in stable condition. Written and verbal after care 
instructions given. Patient refused to sign d/c instruction. pt picc line 
intact and patent. no s/s infection or infiltration.

## 2017-10-18 ENCOUNTER — HOSPITAL ENCOUNTER (EMERGENCY)
Dept: HOSPITAL 12 - ER | Age: 32
Discharge: LEFT BEFORE DISCHARGE | End: 2017-10-18
Payer: MEDICARE

## 2017-10-18 VITALS — HEIGHT: 65 IN | BODY MASS INDEX: 18.33 KG/M2 | WEIGHT: 110 LBS
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
## 2017-10-18 DIAGNOSIS — Z91.013: ICD-10-CM

## 2017-10-18 DIAGNOSIS — R51: ICD-10-CM

## 2017-10-18 DIAGNOSIS — Z88.2: ICD-10-CM
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
## 2017-10-18 DIAGNOSIS — Z91.040: ICD-10-CM

## 2017-10-18 DIAGNOSIS — Z90.49: ICD-10-CM

## 2017-10-18 DIAGNOSIS — R07.89: Primary | ICD-10-CM
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
## 2017-10-18 DIAGNOSIS — Z88.0: ICD-10-CM

## 2017-10-18 PROCEDURE — A4663 DIALYSIS BLOOD PRESSURE CUFF: HCPCS

## 2017-10-18 NOTE — NUR
SHAHNAZ alert informed staff that upon patient's arrival the following are to be 
completed (all of which are being performed):  Nursing supervisor notified of 
patient's arrival, No narcotics given if possible and prefer IVPB if needed, a 
check of the Cures and Care Everywhere databases as well.  Patient was noted to 
check in to another hospital just prior to arrival at this hospital.  ERMD 
notified.

## 2017-10-18 NOTE — NUR
JAYLEEN performed MSE.  Patient was informed by JAYLEEN that he would give her PO 
medication for pain at which time the patient and her mother began to argue 
with ERMD asking for IV medication.  Staff asked patient's mother to remain in 
the room (she was standing at ER nurses station yelling orders to JAYLEEN) as 
instructed in SHAHNAZ alert at which time patient and her mother walked out of ER. 
 Patient observed to walk with guarded gait until seen exiting the building at 
which time she was observed on the camera to stand up straight and walk with a 
steady and relaxed gait.  GILMERD notified of patient elopement.
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
## 2017-11-15 ENCOUNTER — HOSPITAL ENCOUNTER (EMERGENCY)
Dept: HOSPITAL 87 - ER | Age: 32
Discharge: HOME | End: 2017-11-15
Payer: MEDICARE

## 2017-11-15 VITALS — HEIGHT: 65 IN | BODY MASS INDEX: 20.2 KG/M2 | WEIGHT: 121.25 LBS

## 2017-11-15 VITALS — SYSTOLIC BLOOD PRESSURE: 105 MMHG | DIASTOLIC BLOOD PRESSURE: 69 MMHG

## 2017-11-15 DIAGNOSIS — Z88.2: ICD-10-CM

## 2017-11-15 DIAGNOSIS — K21.9: Primary | ICD-10-CM

## 2017-11-15 DIAGNOSIS — Z88.8: ICD-10-CM

## 2017-11-15 DIAGNOSIS — M25.572: ICD-10-CM

## 2017-11-15 DIAGNOSIS — Z88.6: ICD-10-CM

## 2017-11-15 DIAGNOSIS — G43.909: ICD-10-CM

## 2017-11-15 DIAGNOSIS — M25.571: ICD-10-CM

## 2017-11-15 LAB
APPEARANCE UR: CLEAR
BASOPHILS NFR BLD AUTO: 0.3 % (ref 0–2)
CHLORIDE SERPL-SCNC: 108 MEQ/L (ref 98–107)
CO2 SERPL-SCNC: 28 MEQ/L (ref 21–32)
COLOR UR: YELLOW
EOSINOPHIL NFR BLD AUTO: 2.8 % (ref 0–5)
ERYTHROCYTE [DISTWIDTH] IN BLOOD BY AUTOMATED COUNT: 15.7 % (ref 11.6–14.6)
GLUCOSE UR STRIP.AUTO-MCNC: NEGATIVE MG/DL
HCG SERPL QL: NEGATIVE
HCT VFR BLD AUTO: 32.3 % (ref 36–48)
HGB BLD-MCNC: 11.2 G/DL (ref 12–16)
HGB UR QL STRIP: NEGATIVE
INR PPP: 1.1
KETONES UR STRIP-MCNC: NEGATIVE MG/DL
LEUKOCYTE ESTERASE UR QL STRIP: NEGATIVE
LYMPHOCYTES NFR BLD AUTO: 33.8 % (ref 20–50)
MCH RBC QN AUTO: 30.9 PG (ref 28–32)
MCV RBC AUTO: 89.6 FL (ref 81–99)
MONOCYTES NFR BLD AUTO: 6.3 % (ref 2–8)
NEUTROPHILS NFR BLD AUTO: 56.8 % (ref 40–76)
NITRITE UR QL STRIP: NEGATIVE
PH UR STRIP: 5 [PH] (ref 4.5–8)
PLATELET # BLD AUTO: 183 X1000/UL (ref 130–400)
PMV BLD AUTO: 6.6 FL (ref 7.4–10.4)
PROT UR QL STRIP: NEGATIVE
PROTHROMBIN TIME: 11.3 SEC (ref 9.4–11.6)
RBC # BLD AUTO: 3.61 MILL/UL (ref 4.2–5.4)
SP GR UR STRIP: 1.01 (ref 1–1.03)
UROBILINOGEN UR STRIP-MCNC: 0.2 E.U./DL (ref 0.2–1)

## 2017-11-15 PROCEDURE — 80053 COMPREHEN METABOLIC PANEL: CPT

## 2017-11-15 PROCEDURE — 99284 EMERGENCY DEPT VISIT MOD MDM: CPT

## 2017-11-15 PROCEDURE — 84703 CHORIONIC GONADOTROPIN ASSAY: CPT

## 2017-11-15 PROCEDURE — 81003 URINALYSIS AUTO W/O SCOPE: CPT

## 2017-11-15 PROCEDURE — 96375 TX/PRO/DX INJ NEW DRUG ADDON: CPT

## 2017-11-15 PROCEDURE — 85610 PROTHROMBIN TIME: CPT

## 2017-11-15 PROCEDURE — 85025 COMPLETE CBC W/AUTO DIFF WBC: CPT

## 2017-11-15 PROCEDURE — 96374 THER/PROPH/DIAG INJ IV PUSH: CPT

## 2017-11-15 PROCEDURE — 36415 COLL VENOUS BLD VENIPUNCTURE: CPT

## 2017-11-15 PROCEDURE — 83690 ASSAY OF LIPASE: CPT

## 2017-11-17 ENCOUNTER — HOSPITAL ENCOUNTER (EMERGENCY)
Dept: HOSPITAL 87 - ER | Age: 32
Discharge: LEFT BEFORE DISCHARGE | End: 2017-11-17
Payer: MEDICARE

## 2017-11-17 VITALS — WEIGHT: 110.23 LBS | BODY MASS INDEX: 18.37 KG/M2 | HEIGHT: 65 IN

## 2017-11-17 VITALS — DIASTOLIC BLOOD PRESSURE: 68 MMHG | SYSTOLIC BLOOD PRESSURE: 110 MMHG

## 2017-11-17 DIAGNOSIS — R10.9: Primary | ICD-10-CM

## 2017-11-17 DIAGNOSIS — Z88.6: ICD-10-CM

## 2017-11-17 DIAGNOSIS — Z88.2: ICD-10-CM

## 2017-11-17 DIAGNOSIS — Z88.8: ICD-10-CM

## 2017-11-17 DIAGNOSIS — G43.909: ICD-10-CM

## 2017-11-17 LAB
APPEARANCE UR: CLEAR
APTT PPP: 25.2 SEC (ref 23.4–31)
BASOPHILS NFR BLD AUTO: 0.5 % (ref 0–2)
CHLORIDE SERPL-SCNC: 108 MEQ/L (ref 98–107)
CO2 SERPL-SCNC: 26 MEQ/L (ref 21–32)
COLOR UR: YELLOW
EOSINOPHIL NFR BLD AUTO: 3.8 % (ref 0–5)
ERYTHROCYTE [DISTWIDTH] IN BLOOD BY AUTOMATED COUNT: 16.2 % (ref 11.6–14.6)
GLUCOSE UR STRIP.AUTO-MCNC: NEGATIVE MG/DL
HCG SERPL QL: NEGATIVE
HCT VFR BLD AUTO: 32.6 % (ref 36–48)
HGB BLD-MCNC: 11.1 G/DL (ref 12–16)
HGB UR QL STRIP: NEGATIVE
INR PPP: 1.1
KETONES UR STRIP-MCNC: NEGATIVE MG/DL
LEUKOCYTE ESTERASE UR QL STRIP: NEGATIVE
LYMPHOCYTES NFR BLD AUTO: 34.9 % (ref 20–50)
MCH RBC QN AUTO: 30.6 PG (ref 28–32)
MCV RBC AUTO: 89.9 FL (ref 81–99)
MONOCYTES NFR BLD AUTO: 6.6 % (ref 2–8)
NEUTROPHILS NFR BLD AUTO: 54.2 % (ref 40–76)
NITRITE UR QL STRIP: NEGATIVE
PH UR STRIP: 5.5 [PH] (ref 4.5–8)
PLATELET # BLD AUTO: 193 X1000/UL (ref 130–400)
PMV BLD AUTO: 6.8 FL (ref 7.4–10.4)
PROT UR QL STRIP: NEGATIVE
PROTHROMBIN TIME: 10.9 SEC (ref 9.4–11.6)
RBC # BLD AUTO: 3.62 MILL/UL (ref 4.2–5.4)
SP GR UR STRIP: 1.02 (ref 1–1.03)
UROBILINOGEN UR STRIP-MCNC: 0.2 E.U./DL (ref 0.2–1)

## 2017-11-17 PROCEDURE — 74176 CT ABD & PELVIS W/O CONTRAST: CPT

## 2017-11-17 PROCEDURE — 83690 ASSAY OF LIPASE: CPT

## 2017-11-17 PROCEDURE — 96374 THER/PROPH/DIAG INJ IV PUSH: CPT

## 2017-11-17 PROCEDURE — 80053 COMPREHEN METABOLIC PANEL: CPT

## 2017-11-17 PROCEDURE — 99285 EMERGENCY DEPT VISIT HI MDM: CPT

## 2017-11-17 PROCEDURE — 86900 BLOOD TYPING SEROLOGIC ABO: CPT

## 2017-11-17 PROCEDURE — 86901 BLOOD TYPING SEROLOGIC RH(D): CPT

## 2017-11-17 PROCEDURE — 84703 CHORIONIC GONADOTROPIN ASSAY: CPT

## 2017-11-17 PROCEDURE — 71010: CPT

## 2017-11-17 PROCEDURE — 85025 COMPLETE CBC W/AUTO DIFF WBC: CPT

## 2017-11-17 PROCEDURE — 93005 ELECTROCARDIOGRAM TRACING: CPT

## 2017-11-17 PROCEDURE — 81003 URINALYSIS AUTO W/O SCOPE: CPT

## 2017-11-17 PROCEDURE — 85730 THROMBOPLASTIN TIME PARTIAL: CPT

## 2017-11-17 PROCEDURE — 85610 PROTHROMBIN TIME: CPT

## 2017-11-17 PROCEDURE — 96375 TX/PRO/DX INJ NEW DRUG ADDON: CPT

## 2017-11-17 PROCEDURE — 86850 RBC ANTIBODY SCREEN: CPT

## 2017-11-17 PROCEDURE — 96361 HYDRATE IV INFUSION ADD-ON: CPT

## 2017-11-17 PROCEDURE — 36415 COLL VENOUS BLD VENIPUNCTURE: CPT

## 2017-11-19 ENCOUNTER — HOSPITAL ENCOUNTER (EMERGENCY)
Dept: HOSPITAL 10 - FTE | Age: 32
Discharge: HOME | End: 2017-11-19
Payer: MEDICARE

## 2017-11-19 VITALS
WEIGHT: 123.46 LBS | BODY MASS INDEX: 20.57 KG/M2 | WEIGHT: 123.46 LBS | HEIGHT: 65 IN | BODY MASS INDEX: 20.57 KG/M2 | HEIGHT: 65 IN

## 2017-11-19 VITALS
HEART RATE: 72 BPM | DIASTOLIC BLOOD PRESSURE: 73 MMHG | TEMPERATURE: 98.8 F | SYSTOLIC BLOOD PRESSURE: 112 MMHG | RESPIRATION RATE: 18 BRPM

## 2017-11-19 DIAGNOSIS — R10.13: Primary | ICD-10-CM

## 2017-11-19 DIAGNOSIS — R11.2: ICD-10-CM

## 2017-11-19 LAB
ADD UMIC: YES
ALBUMIN SERPL-MCNC: 4.6 G/DL (ref 3.3–4.9)
ALBUMIN/GLOB SERPL: 1.76 {RATIO}
ALP SERPL-CCNC: 61 IU/L (ref 42–121)
ALT SERPL-CCNC: 22 IU/L (ref 13–69)
ANION GAP SERPL CALC-SCNC: 17 MMOL/L (ref 8–16)
AST SERPL-CCNC: 19 IU/L (ref 15–46)
BACTERIA #/AREA URNS HPF: (no result) /HPF
BASOPHILS # BLD AUTO: 0 10^3/UL (ref 0–0.1)
BASOPHILS NFR BLD: 0.2 % (ref 0–2)
BILIRUB DIRECT SERPL-MCNC: 0 MG/DL (ref 0–0.2)
BILIRUB SERPL-MCNC: 0.1 MG/DL (ref 0.2–1.3)
BUN SERPL-MCNC: 13 MG/DL (ref 7–20)
CALCIUM SERPL-MCNC: 9.8 MG/DL (ref 8.4–10.2)
CHLORIDE SERPL-SCNC: 106 MMOL/L (ref 97–110)
CO2 SERPL-SCNC: 25 MMOL/L (ref 21–31)
COLOR UR: YELLOW
CREAT SERPL-MCNC: 0.66 MG/DL (ref 0.44–1)
EOSINOPHIL # BLD: 0.2 10^3/UL (ref 0–0.5)
EOSINOPHIL NFR BLD: 3.4 % (ref 0–7)
ERYTHROCYTE [DISTWIDTH] IN BLOOD BY AUTOMATED COUNT: 14.1 % (ref 11.5–14.5)
GLOBULIN SER-MCNC: 2.6 G/DL (ref 1.3–3.2)
GLUCOSE SERPL-MCNC: 96 MG/DL (ref 70–220)
GLUCOSE UR STRIP-MCNC: NEGATIVE MG/DL
HCT VFR BLD CALC: 34.6 % (ref 37–47)
HGB BLD-MCNC: 11.4 G/DL (ref 12–16)
KETONES UR STRIP.AUTO-MCNC: NEGATIVE MG/DL
LYMPHOCYTES # BLD AUTO: 2.1 10^3/UL (ref 0.8–2.9)
LYMPHOCYTES NFR BLD AUTO: 37.1 % (ref 15–51)
MCH RBC QN AUTO: 30.2 PG (ref 29–33)
MCHC RBC AUTO-ENTMCNC: 32.9 G/DL (ref 32–37)
MCV RBC AUTO: 91.5 FL (ref 82–101)
MONOCYTES # BLD: 0.4 10^3/UL (ref 0.3–0.9)
MONOCYTES NFR BLD: 6.3 % (ref 0–11)
NEUTROPHILS # BLD: 2.9 10^3/UL (ref 1.6–7.5)
NEUTROPHILS NFR BLD AUTO: 52.8 % (ref 39–77)
NITRITE UR QL STRIP.AUTO: NEGATIVE MG/DL
NRBC # BLD MANUAL: 0 10^3/UL (ref 0–0)
NRBC BLD AUTO-RTO: 0 /100WBC (ref 0–0)
PLATELET # BLD: 201 10^3/UL (ref 140–415)
PMV BLD AUTO: 8.9 FL (ref 7.4–10.4)
POTASSIUM SERPL-SCNC: 4.7 MMOL/L (ref 3.5–5.1)
PROT SERPL-MCNC: 7.2 G/DL (ref 6.1–8.1)
RBC # BLD AUTO: 3.78 10^6/UL (ref 4.2–5.4)
RBC # UR AUTO: NEGATIVE MG/DL
SODIUM SERPL-SCNC: 143 MMOL/L (ref 135–144)
SQUAMOUS #/AREA URNS HPF: (no result) /HPF
UR AMORPHOUS CRYSTAL: (no result) /HPF
UR ASCORBIC ACID: 20 MG/DL
UR BILIRUBIN (DIP): NEGATIVE MG/DL
UR CLARITY: (no result)
UR PH (DIP): 7 (ref 5–9)
UR RBC: 2 /HPF (ref 0–5)
UR SPECIFIC GRAVITY (DIP): 1.02 (ref 1–1.03)
UR TOTAL PROTEIN (DIP): NEGATIVE MG/DL
UROBILINOGEN UR STRIP-ACNC: NEGATIVE MG/DL
WBC # BLD AUTO: 5.6 10^3/UL (ref 4.8–10.8)
WBC # UR STRIP: NEGATIVE LEU/UL

## 2017-11-19 PROCEDURE — 76705 ECHO EXAM OF ABDOMEN: CPT

## 2017-11-19 PROCEDURE — 81001 URINALYSIS AUTO W/SCOPE: CPT

## 2017-11-19 PROCEDURE — 85025 COMPLETE CBC W/AUTO DIFF WBC: CPT

## 2017-11-19 PROCEDURE — 36415 COLL VENOUS BLD VENIPUNCTURE: CPT

## 2017-11-19 PROCEDURE — 96374 THER/PROPH/DIAG INJ IV PUSH: CPT

## 2017-11-19 PROCEDURE — 84703 CHORIONIC GONADOTROPIN ASSAY: CPT

## 2017-11-19 PROCEDURE — 96375 TX/PRO/DX INJ NEW DRUG ADDON: CPT

## 2017-11-19 PROCEDURE — 83690 ASSAY OF LIPASE: CPT

## 2017-11-19 PROCEDURE — 99285 EMERGENCY DEPT VISIT HI MDM: CPT

## 2017-11-19 PROCEDURE — 80053 COMPREHEN METABOLIC PANEL: CPT

## 2017-11-19 NOTE — RADRPT
PROCEDURE:   US Abdomen. 

 

CLINICAL INDICATION:    Abdominal pain  

 

TECHNIQUE:   Multiple real-time images were acquired of the patient's abdomen and retroperitoneum ut
ilizing a high resolution transducer. 

 

COMPARISON:   None 

 

FINDINGS:

 

The liver demonstrates normal echogenicity.  The liver is normal in size and no focal lesions are se
en.  The portal vein is patent with normal direction of flow.  Mild intrahepatic biliary dilatation 
is seen. 

 

Gallbladder is surgically absent. The common bile duct measures 3 mm in maximal dimension.  

 

The visualized portions of the pancreas are unremarkable. 

 

The right kidney is normal size, and demonstrate normal echogenicity and cortical thickness.  The ri
ght kidney measures 9 cm in long dimension. There is mild hydronephrosis. There are no kidney stones
.  

 

IMPRESSION:

 

1. Status post cholecystectomy. Mild intrahepatic biliary ductal dilatation.

2. Mild right hydronephrosis.

 

RPTAT: QQ

_____________________________________________ 

Physician Gerard           Date    Time 

Electronically viewed and signed by Physician Gerard on 11/19/2017 16:00 

 

D:  11/19/2017 16:00  T:  11/19/2017 16:00

/

## 2017-11-19 NOTE — ERD
ER Documentation


Chief Complaint


Chief Complaint


epigastric pain, nausea, vomitting since this morning





HPI


32-year-old female complaining of epigastric pain with nausea and vomiting 

since this morning.  Patient states she has a history of pancreatitis and feels 

that her pain is similar to her previous episodes.  Normal bowel movements.  No 

chest pain or shortness of breath.  No fevers.  She has a "lupus-like 

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  Has not taken 

medications for her symptoms today.





ROS


All systems reviewed and are negative except as per history of present illness.





PMhx/Soc


Medical and Surgical Hx:  pt denies Medical Hx, pt denies Surgical Hx


History of Surgery:  No


Anesthesia Reaction:  No


Hx Neurological Disorder:  No


Hx Respiratory Disorders:  No


Hx Psychiatric Problems:  No


Hx Miscellaneous Medical Probl:  No


Hx Alcohol Use:  No


Hx Substance Use:  No


Hx Tobacco Use:  No





Physical Exam


Vitals





Vital Signs








  Date Time  Temp Pulse Resp B/P Pulse Ox O2 Delivery O2 Flow Rate FiO2


 


17 17:09 98.8 72 18 112/73 99   


 


17 13:44 99.0 106 18 105/69 99   








Physical Exam


GENERAL: The patient is well-appearing, well-nourished, in no acute distress


CHEST: Clear to auscultation bilaterally.  There are no rales, wheezes or 

rhonchi.


HEART: Regular rate and rhythm.  No murmurs, clicks, rubs or gallops.  No S3 or 

S4.


ABDOMEN: Normoactive bowel sounds, no distention, mild tenderness palpation 

epigastric region.  No rigidity.  No organomegaly.


BACK: No midline or flank tenderness.


SKIN: There is no apparent rash or petechiae.  The skin is warm and dry.


Result Diagram:  


17 1430                                                                  

              17 1430





Results 24 hrs





 Laboratory Tests








Test


  17


14:30


 


White Blood Count 5.610^3/ul 


 


Red Blood Count 3.7810^6/ul 


 


Hemoglobin 11.4g/dl 


 


Hematocrit 34.6% 


 


Mean Corpuscular Volume 91.5fl 


 


Mean Corpuscular Hemoglobin 30.2pg 


 


Mean Corpuscular Hemoglobin


Concent 32.9g/dl 


 


 


Red Cell Distribution Width 14.1% 


 


Platelet Count 33059^3/UL 


 


Mean Platelet Volume 8.9fl 


 


Neutrophils % 52.8% 


 


Lymphocytes % 37.1% 


 


Monocytes % 6.3% 


 


Eosinophils % 3.4% 


 


Basophils % 0.2% 


 


Nucleated Red Blood Cells % 0.0/100WBC 


 


Neutrophils # 2.910^3/ul 


 


Lymphocytes # 2.110^3/ul 


 


Monocytes # 0.410^3/ul 


 


Eosinophils # 0.210^3/ul 


 


Basophils # 0.010^3/ul 


 


Nucleated Red Blood Cells # 0.010^3/ul 


 


Urine Color YELLOW 


 


Urine Clarity CLOUDY 


 


Urine pH 7.0 


 


Urine Specific Gravity 1.016 


 


Urine Ketones NEGATIVEmg/dL 


 


Urine Nitrite NEGATIVEmg/dL 


 


Urine Bilirubin NEGATIVEmg/dL 


 


Urine Urobilinogen NEGATIVEmg/dL 


 


Urine Leukocyte Esterase NEGATIVELeu/ul 


 


Urine Microscopic RBC 2/HPF 


 


Urine Microscopic WBC 6/HPF 


 


Urine Squamous Epithelial


Cells FEW/HPF 


 


 


Urine Amorphous Crystals FEW/HPF 


 


Urine Bacteria FEW/HPF 


 


Urine Hemoglobin NEGATIVEmg/dL 


 


Urine Glucose NEGATIVEmg/dL 


 


Urine Total Protein NEGATIVEmg/dl 


 


Sodium Level 143mmol/L 


 


Potassium Level 4.7mmol/L 


 


Chloride Level 106mmol/L 


 


Carbon Dioxide Level 25mmol/L 


 


Anion Gap 17 


 


Blood Urea Nitrogen 13mg/dl 


 


Creatinine 0.66mg/dl 


 


Glucose Level 96mg/dl 


 


Calcium Level 9.8mg/dl 


 


Total Bilirubin 0.1mg/dl 


 


Direct Bilirubin 0.00mg/dl 


 


Indirect Bilirubin 0.1mg/dl 


 


Aspartate Amino Transf


(AST/SGOT) 19IU/L 


 


 


Alanine Aminotransferase


(ALT/SGPT) 22IU/L 


 


 


Alkaline Phosphatase 61IU/L 


 


Total Protein 7.2g/dl 


 


Albumin 4.6g/dl 


 


Globulin 2.60g/dl 


 


Albumin/Globulin Ratio 1.76 


 


Lipase 470U/L 


 


Serum HCG, Qualitative NEGATIVE 








 Current Medications








 Medications


  (Trade)  Dose


 Ordered  Sig/Tomasz


 Route


 PRN Reason  Start Time


 Stop Time Status Last Admin


Dose Admin


 


 Sodium Chloride


  (NS)  1,000 ml @ 


 1,000 mls/hr  Q1H STAT


 IV


   17 14:18


 17 15:17 DC 17 14:30


 


 


 Morphine Sulfate


  (morphine)  4 mg  ONCE  STAT


 IV


   17 14:18


 17 14:21 DC 17 14:36


 


 


 Ondansetron HCl


  (Zofran Inj)  4 mg  ONCE  STAT


 IV


   17 14:18


 17 14:21 DC 17 14:36


 


 


 Diphenhydramine


 HCl


  (Benadryl)  25 mg  ONCE  ONCE


 PO


   17 14:30


 17 14:31 DC 17 14:35


 


 


 Famotidine


  (Pepcid Iv)  20 mg  ONCE  ONCE


 IV


   17 15:00


 17 15:01 DC 17 14:45


 


 


 Hydromorphone HCl


  (Dilaudid)  0.5 mg  ONCE  STAT


 IV


   17 16:16


 17 16:17 DC 17 16:36


 


 


 Scopolamine


  (Transderm-Scop)  1 patch  ONCE  ONCE


 TRANSDERM


   17 16:30


 17 16:31 DC 17 16:41


 


 


 Diphenhydramine


 HCl


  (Benadryl)  25 mg  ONCE  ONCE


 IV


   17 16:30


 17 16:31 DC 17 16:36


 











Procedures/MDM


DIAGNOSTIC IMAGING REPORT





 Patient: PETER WOO   : 1985   Age: 32  Sex: F                      

  


 MR #:    [id number]   Acct #:   [id number]    DOS: 17 1418


 Ordering MD: LILLIAN MCLEAN PA-C   Location:  [facility]   Room/Bed:          

                                  


 








PROCEDURE:   US Abdomen. 


 


CLINICAL INDICATION:    Abdominal pain  


 


TECHNIQUE:   Multiple real-time images were acquired of the patient's abdomen 

and retroperitoneum utilizing a high resolution transducer. 


 


COMPARISON:   None 


 


FINDINGS:


 


The liver demonstrates normal echogenicity.  The liver is normal in size and no 

focal lesions are seen.  The portal vein is patent with normal direction of 

flow.  Mild intrahepatic biliary dilatation is seen. 


 


Gallbladder is surgically absent. The common bile duct measures 3 mm in maximal 

dimension.  


 


The visualized portions of the pancreas are unremarkable. 


 


The right kidney is normal size, and demonstrate normal echogenicity and 

cortical thickness.  The right kidney measures 9 cm in long dimension. There is 

mild hydronephrosis. There are no kidney stones.  


 


IMPRESSION:


 


1. Status post cholecystectomy. Mild intrahepatic biliary ductal dilatation.


2. Mild right hydronephrosis.





ER Course: IV Morphine, Dilaudid, and Benadryl given in ED. patient's symptoms 

improved.  Pain was well controlled.





MDM: 32-year-old female complaining of epigastric pain with a history of 

pancreatitis.  Patient does have mildly elevated lipase however is not 3 times 

the upper limit and pain is controlled upon discharge I do not feel there is 

indication for admission at this time.  Patient's ultrasound is within normal 

limits.  LFTs are within normal limits I do not feel there is concern for 

choledocholithiasis at this time.  Patient's pain is well controlled and 

patient is tolerating p.o.'s in the ED.  I do not feel that there is indication 

for admission at this time.  I have low suspicion for other acute abdominal 

emergencies and do not feel there is indication for CT scan.  Patient is 

discharged with strict ER precautions and recommended to follow-up with primary 

care within 1-2 days for close evaluation.  Patient is discharged with strict 

ER precautions, all questions answered discharge per





Departure


Diagnosis:  


 Primary Impression:  


 Epigastric pain


Condition:  Stable


Patient Instructions:  Epigastric Pain (Uncertain Cause)


Referrals:  


[facility]


YOU HAVE RECEIVED A MEDICAL SCREENING EXAM AND THE RESULTS INDICATE THAT YOU DO 

NOT HAVE A CONDITION THAT REQUIRES URGENT TREATMENT IN THE EMERGENCY DEPARTMENT.





FURTHER EVALUATION AND TREATMENT OF YOUR CONDITION CAN WAIT UNTIL YOU ARE SEEN 

IN YOUR DOCTORS OFFICE WITHIN THE NEXT 1-2 DAYS. IT IS YOUR RESPONSIBILITY TO 

MAKE AN APPOINTMENT FOR [facility].





IF YOU HAVE A PRIMARY DOCTOR


--you should call your primary doctor and schedule an appointment





IF YOU DO NOT HAVE A PRIMARY DOCTOR YOU CAN CALL OUR PHYSICIAN REFERRAL HOTLINE 

AT


 [phone] 





IF YOU CAN NOT AFFORD TO SEE A PHYSICIAN YOU CAN CHOSE FROM THE FOLLOWING 

[facility] [phone] [address] [facility]. [facility] [phone] [address] [facility]. [facility] [phone]


[address] [facility] [phone] [address] [facility]. [facility] [phone] [address] [facility]. [facility] [phone]


[address]





Additional Instructions:  


FOLLOW UP WITH YOUR PRIMARY CARE PHYSICIAN TOMORROW.Return to this facility if 

you are not improving as expected.











LAMAR MCLEAN PA-C 2017 17:59

## 2017-12-24 ENCOUNTER — HOSPITAL ENCOUNTER (EMERGENCY)
Dept: HOSPITAL 10 - FTE | Age: 32
Discharge: HOME | End: 2017-12-24
Payer: MEDICARE

## 2017-12-24 VITALS
BODY MASS INDEX: 20.28 KG/M2 | HEIGHT: 65 IN | WEIGHT: 121.72 LBS | HEIGHT: 65 IN | BODY MASS INDEX: 20.28 KG/M2 | WEIGHT: 121.72 LBS

## 2017-12-24 DIAGNOSIS — K52.9: Primary | ICD-10-CM

## 2017-12-24 LAB
ADD UMIC: NO
ALBUMIN SERPL-MCNC: 4.1 G/DL (ref 3.3–4.9)
ALBUMIN/GLOB SERPL: 1.36 {RATIO}
ALP SERPL-CCNC: 70 IU/L (ref 42–121)
ALT SERPL-CCNC: 36 IU/L (ref 13–69)
ANION GAP SERPL CALC-SCNC: 16 MMOL/L (ref 8–16)
AST SERPL-CCNC: 26 IU/L (ref 15–46)
BASOPHILS # BLD AUTO: 0 10^3/UL (ref 0–0.1)
BASOPHILS NFR BLD: 0.6 % (ref 0–2)
BILIRUB DIRECT SERPL-MCNC: 0 MG/DL (ref 0–0.2)
BILIRUB SERPL-MCNC: 0.2 MG/DL (ref 0.2–1.3)
BUN SERPL-MCNC: 10 MG/DL (ref 7–20)
CALCIUM SERPL-MCNC: 9.4 MG/DL (ref 8.4–10.2)
CHLORIDE SERPL-SCNC: 106 MMOL/L (ref 97–110)
CO2 SERPL-SCNC: 24 MMOL/L (ref 21–31)
COLOR UR: YELLOW
CREAT SERPL-MCNC: 0.6 MG/DL (ref 0.44–1)
EOSINOPHIL # BLD: 0.2 10^3/UL (ref 0–0.5)
EOSINOPHIL NFR BLD: 2.9 % (ref 0–7)
ERYTHROCYTE [DISTWIDTH] IN BLOOD BY AUTOMATED COUNT: 13.8 % (ref 11.5–14.5)
GLOBULIN SER-MCNC: 3 G/DL (ref 1.3–3.2)
GLUCOSE SERPL-MCNC: 85 MG/DL (ref 70–220)
GLUCOSE UR STRIP-MCNC: NEGATIVE MG/DL
HCT VFR BLD CALC: 33.4 % (ref 37–47)
HGB BLD-MCNC: 11.4 G/DL (ref 12–16)
KETONES UR STRIP.AUTO-MCNC: NEGATIVE MG/DL
LYMPHOCYTES # BLD AUTO: 1.7 10^3/UL (ref 0.8–2.9)
LYMPHOCYTES NFR BLD AUTO: 31.6 % (ref 15–51)
MCH RBC QN AUTO: 30.5 PG (ref 29–33)
MCHC RBC AUTO-ENTMCNC: 34.1 G/DL (ref 32–37)
MCV RBC AUTO: 89.3 FL (ref 82–101)
MONOCYTES # BLD: 0.4 10^3/UL (ref 0.3–0.9)
MONOCYTES NFR BLD: 7.9 % (ref 0–11)
NEUTROPHILS # BLD: 3.1 10^3/UL (ref 1.6–7.5)
NEUTROPHILS NFR BLD AUTO: 56.8 % (ref 39–77)
NITRITE UR QL STRIP.AUTO: NEGATIVE MG/DL
NRBC # BLD MANUAL: 0 10^3/UL (ref 0–0)
NRBC BLD AUTO-RTO: 0 /100WBC (ref 0–0)
PLATELET # BLD: 213 10^3/UL (ref 140–415)
PMV BLD AUTO: 9 FL (ref 7.4–10.4)
POTASSIUM SERPL-SCNC: 4.5 MMOL/L (ref 3.5–5.1)
PROT SERPL-MCNC: 7.1 G/DL (ref 6.1–8.1)
RBC # BLD AUTO: 3.74 10^6/UL (ref 4.2–5.4)
RBC # UR AUTO: NEGATIVE MG/DL
SODIUM SERPL-SCNC: 141 MMOL/L (ref 135–144)
UR ASCORBIC ACID: NEGATIVE MG/DL
UR BILIRUBIN (DIP): NEGATIVE MG/DL
UR CLARITY: CLEAR
UR PH (DIP): 7 (ref 5–9)
UR SPECIFIC GRAVITY (DIP): 1.02 (ref 1–1.03)
UR TOTAL PROTEIN (DIP): NEGATIVE MG/DL
UROBILINOGEN UR STRIP-ACNC: NEGATIVE MG/DL
WBC # BLD AUTO: 5.5 10^3/UL (ref 4.8–10.8)
WBC # UR STRIP: NEGATIVE LEU/UL

## 2017-12-24 PROCEDURE — 99285 EMERGENCY DEPT VISIT HI MDM: CPT

## 2017-12-24 PROCEDURE — 85025 COMPLETE CBC W/AUTO DIFF WBC: CPT

## 2017-12-24 PROCEDURE — 36415 COLL VENOUS BLD VENIPUNCTURE: CPT

## 2017-12-24 PROCEDURE — 96376 TX/PRO/DX INJ SAME DRUG ADON: CPT

## 2017-12-24 PROCEDURE — 96375 TX/PRO/DX INJ NEW DRUG ADDON: CPT

## 2017-12-24 PROCEDURE — 96374 THER/PROPH/DIAG INJ IV PUSH: CPT

## 2017-12-24 PROCEDURE — 81003 URINALYSIS AUTO W/O SCOPE: CPT

## 2017-12-24 PROCEDURE — 83690 ASSAY OF LIPASE: CPT

## 2017-12-24 PROCEDURE — 80053 COMPREHEN METABOLIC PANEL: CPT

## 2017-12-24 PROCEDURE — 74177 CT ABD & PELVIS W/CONTRAST: CPT

## 2017-12-24 NOTE — ERD
ER Documentation


Chief Complaint


Chief Complaint


EPIGASTRIC PAIN DIARRHEA HAS TAHCYCARDIA HX OF AUTOIMMUNE PANCREIATITIS





HPI


This 32-year-old female with a history of autoimmune pancreatitis with just 

completing a course of antibiotics for infected right chest Mediport.  Recent 

cultures of this have been clear.  Patient says she has had diarrhea for 3 days 

that is watery and nonbloody she is also complaining of some epigastric 

tenderness with heartburn which is chronic.  She has a history of tachycardia 

of uncertain etiology but she is not having any episodes and no chest pain.  No 

vomiting no fever no back pain.  She is also complaining of 2-3 days of dysuria





ROS


All systems reviewed and are negative except as per history of present illness.





Medications


Home Meds


No Active Prescriptions or Reported Meds





Allergies


Allergies:  


Coded Allergies:  


     Penicillins (Unverified  Allergy, Unknown, 12/24/17)


     Sulfa (Sulfonamide Antibiotics) (Unverified  Allergy, Unknown, 12/24/17)


     acetaminophen (Unverified  Allergy, Unknown, 12/24/17)


     aspirin (Unverified  Allergy, Unknown, 12/24/17)


     codeine (Unverified  Allergy, Unknown, 12/24/17)


     metoclopramide (Unverified  Allergy, Unknown, 12/24/17)


     prochlorperazine (Unverified  Allergy, Unknown, 12/24/17)


     vancomycin (Unverified  Allergy, Unknown, 12/24/17)





PMhx/Soc


History of Surgery:  No


Anesthesia Reaction:  No


Hx Neurological Disorder:  No


Hx Respiratory Disorders:  No


Hx Psychiatric Problems:  No


Hx Miscellaneous Medical Probl:  No


Hx Alcohol Use:  No


Hx Substance Use:  No


Hx Tobacco Use:  No


Smoking Status:  Never smoker





FmHx


Family History:  No coronary disease





Physical Exam


Vitals





Vital Signs








  Date Time  Temp Pulse Resp B/P Pulse Ox O2 Delivery O2 Flow Rate FiO2


 


12/24/17 10:54 98.0 122 18 134/88 100   








Physical Exam


Const:      Well-developed, well-nourished


 Head:        Atraumatic, normocephalic


 Eyes:       Normal Conjunctiva, PERRLA, EOMI, normal sclera, no nystagmus


 ENT:         Normal External Ears, Nose and Mouth, moist mucus membranes.


 Neck:        Full range of motion.  No meningismus, no lymphadenopathy.


 Resp:         Clear to auscultation bilaterally, no wheezing, rhonchi, rales


 Cardio:       Regular rate and rhythm, no murmurs, S1 S2 present


 Abd:         Soft, mild epigastric tenderness, non distended. Normal bowel 

sounds, no guarding or rebound, no pulsitile abdominal masses or bruits


 Skin:         No petechiae or rashes, no ecchymosis , no maculopapular rash


 Back:        No midline or flank tenderness


 Ext:          No cyanosis, or edema, FROM x 4, normal inspection, 

neurovascularly intact x 4


 Neur:        Awake and alert, STR 5/5 x 4, sensation intact x 4, no focal 

findings, cerebellum intact


 Psych:        Normal Mood and Affect


Result Diagram:  


12/24/17 1230                                                                  

              12/24/17 1230





Results 24 hrs





 Laboratory Tests








Test


  12/24/17


12:30


 


White Blood Count 5.510^3/ul 


 


Red Blood Count 3.7410^6/ul 


 


Hemoglobin 11.4g/dl 


 


Hematocrit 33.4% 


 


Mean Corpuscular Volume 89.3fl 


 


Mean Corpuscular Hemoglobin 30.5pg 


 


Mean Corpuscular Hemoglobin


Concent 34.1g/dl 


 


 


Red Cell Distribution Width 13.8% 


 


Platelet Count 12079^3/UL 


 


Mean Platelet Volume 9.0fl 


 


Neutrophils % 56.8% 


 


Lymphocytes % 31.6% 


 


Monocytes % 7.9% 


 


Eosinophils % 2.9% 


 


Basophils % 0.6% 


 


Nucleated Red Blood Cells % 0.0/100WBC 


 


Neutrophils # 3.110^3/ul 


 


Lymphocytes # 1.710^3/ul 


 


Monocytes # 0.410^3/ul 


 


Eosinophils # 0.210^3/ul 


 


Basophils # 0.010^3/ul 


 


Nucleated Red Blood Cells # 0.010^3/ul 


 


Urine Color YELLOW 


 


Urine Clarity CLEAR 


 


Urine pH 7.0 


 


Urine Specific Gravity 1.017 


 


Urine Ketones NEGATIVEmg/dL 


 


Urine Nitrite NEGATIVEmg/dL 


 


Urine Bilirubin NEGATIVEmg/dL 


 


Urine Urobilinogen NEGATIVEmg/dL 


 


Urine Leukocyte Esterase NEGATIVELeu/ul 


 


Urine Hemoglobin NEGATIVEmg/dL 


 


Urine Glucose NEGATIVEmg/dL 


 


Urine Total Protein NEGATIVEmg/dl 


 


Sodium Level 141mmol/L 


 


Potassium Level 4.5mmol/L 


 


Chloride Level 106mmol/L 


 


Carbon Dioxide Level 24mmol/L 


 


Anion Gap 16 


 


Blood Urea Nitrogen 10mg/dl 


 


Creatinine 0.60mg/dl 


 


Glucose Level 85mg/dl 


 


Calcium Level 9.4mg/dl 


 


Total Bilirubin 0.2mg/dl 


 


Direct Bilirubin 0.00mg/dl 


 


Indirect Bilirubin 0.2mg/dl 


 


Aspartate Amino Transf


(AST/SGOT) 26IU/L 


 


 


Alanine Aminotransferase


(ALT/SGPT) 36IU/L 


 


 


Alkaline Phosphatase 70IU/L 


 


Total Protein 7.1g/dl 


 


Albumin 4.1g/dl 


 


Globulin 3.00g/dl 


 


Albumin/Globulin Ratio 1.36 


 


Lipase 313U/L 








 Current Medications








 Medications


  (Trade)  Dose


 Ordered  Sig/Tomasz


 Route


 PRN Reason  Start Time


 Stop Time Status Last Admin


Dose Admin


 


 Sodium Chloride


  (NS)  1,000 ml @ 


 1,000 mls/hr  Q1H STAT


 IV


   12/24/17 13:06


 12/24/17 14:05 DC 12/24/17 13:29


 


 


 Morphine Sulfate


  (morphine)  6 mg  ONCE  STAT


 IV


   12/24/17 13:06


 12/24/17 13:08 DC 12/24/17 13:29


 


 


 Ondansetron HCl 4


 mg  4 mg  ONCE  STAT


 IV


   12/24/17 13:06


 12/24/17 13:08 DC 12/24/17 13:29


 


 


 Sodium Chloride


  (NS)  100 ml @ ud  STK-MED ONCE


 .ROUTE


   12/24/17 13:36


 12/24/17 13:37 DC 12/24/17 13:49


 


 


 Iohexol


  (Omnipaque 300mg/


 ml)  150 ml  STK-MED ONCE


 .ROUTE


   12/24/17 13:36


 12/24/17 13:37 DC 12/24/17 13:50


 


 


 Morphine Sulfate


  (morphine)  6 mg  ONCE  STAT


 IV


   12/24/17 14:35


 12/24/17 14:36 DC 12/24/17 14:44


 


 


 Ondansetron HCl


  (Zofran Inj)  4 mg  ONCE  STAT


 IV


   12/24/17 14:35


 12/24/17 14:36 DC 12/24/17 14:44


 


 


 Famotidine


  (Pepcid Iv)  20 mg  ONCE  STAT


 IV


   12/24/17 14:35


 12/24/17 14:36 DC 12/24/17 14:44


 











Procedures/MDM





PROCEDURE:   CT Abdomen and Pelvis with contrast. 


 


CLINICAL INDICATION:   Abdominal pain. 


 


TECHNIQUE:   CT scan of the abdomen and pelvis with contrast was performed on a 

multi-detector CT scanner.  The patient was scanned following the uncomplicated 

intravenous administration of 90 cc of Omnipaque 300. Coronal and sagittal 

reformatted images were obtained from the axial source images. Images were 

reviewed on a high-resolution PACS workstation. DICOM images are available.


 


One or more of the following dose reduction techniques were used:


-Automated exposure control. 


-Adjustment of the mA and/or kV according to patient size. 


-Use of iterative reconstruction technique. 


 


The total exam CTDI equals 5.10 mGy and the total exam DLP equals 250.10 mGy-cm.


 


COMPARISON:   Ultrasound 11/19/2017.


 


FINDINGS:


 


Images of the lung bases are clear. Incompletely imaged breast implants.


 


CT abdomen:


 


Liver: There are a few too small to characterize hypodense lesions in the liver.


 


Biliary system: Mild prominence of a few intrahepatic bile ducts, likely 

related to post cholecystectomy state.


 


Gallbladder: There are post-surgical changes of a cholecystectomy.


 


Pancreas: Unremarkable.


 


Spleen: Unremarkable.


 


Adrenal glands: Unremarkable.


 


Right kidney: Unremarkable. No hydronephrosis or hydroureter.


 


Left kidney: Unremarkable. No hydronephrosis or hydroureter.


 


Bowel loops: There are nonspecific mildly prominent air and fluid-filled small 

bowel loops. There is a moderate amount of stool noted throughout the colon. 

Appendix is normal.  There is no bowel obstruction.


 


Lymph Nodes: There is no mesenteric lymphadenopathy.  There is no 

retroperitoneal lymphadenopathy.


 


CT pelvis:


 


Rectum: Unremarkable.


 


Urinary bladder: Unremarkable.


 


Uterus appears unremarkable. No adnexal masses.


 


Lymph nodes: There is no iliac lymphadenopathy. There is no inguinal 

lymphadenopathy.


 


Bone: No aggressive osseous lesions. 


 


IMPRESSION:


 


1.  Non-specific mildly prominent air and fluid-filled small bowel loops, which 

can be seen with enteritis. Moderate amount of stool throughout the colon. No 

bowel obstruction. Normal appendix.


 


2. Status post cholecystectomy.


 


RPTAT: [facility]


_____________________________________________ 


Physician Cate           Date    Time 


Electronically viewed and signed by Abeba Alicea Physician on 12/24/2017 14:51 


 


D:  12/24/2017 14:51  T:  12/24/2017 14:51


PH/





CC: CARLOTA WELLS DO

















Patient has some enteritis is possible some antibiotics she was getting through 

port.  Will treat with some Flagyl and antidiarrheal medicine.  Does not have a 

UTI as she likely has some type of urethritis





Departure


Diagnosis:  


 Primary Impression:  


 Enteritis


Condition:  Stable











CARLOTA WELLS DO Dec 24, 2017 15:25

## 2017-12-24 NOTE — RADRPT
PROCEDURE:   CT Abdomen and Pelvis with contrast. 

 

CLINICAL INDICATION:   Abdominal pain. 

 

TECHNIQUE:   CT scan of the abdomen and pelvis with contrast was performed on a multi-detector CT [facility].  The patient was scanned following the uncomplicated intravenous administration of 90 cc of O
mnipaque 300. Coronal and sagittal reformatted images were obtained from the axial source images. Im
ages were reviewed on a high-resolution PACS workstation. DICOM images are available.

 

One or more of the following dose reduction techniques were used:

-Automated exposure control. 

-Adjustment of the mA and/or kV according to patient size. 

-Use of iterative reconstruction technique. 

 

The total exam CTDI equals 5.10 mGy and the total exam DLP equals 250.10 mGy-cm.

 

COMPARISON:   Ultrasound 11/19/2017.

 

FINDINGS:

 

Images of the lung bases are clear. Incompletely imaged breast implants.

 

CT abdomen:

 

Liver: There are a few too small to characterize hypodense lesions in the liver.

 

Biliary system: Mild prominence of a few intrahepatic bile ducts, likely related to post cholecystec
mathieu state.

 

Gallbladder: There are post-surgical changes of a cholecystectomy.

 

Pancreas: Unremarkable.

 

Spleen: Unremarkable.

 

Adrenal glands: Unremarkable.

 

Right kidney: Unremarkable. No hydronephrosis or hydroureter.

 

Left kidney: Unremarkable. No hydronephrosis or hydroureter.

 

Bowel loops: There are nonspecific mildly prominent air and fluid-filled small bowel loops. There is
 a moderate amount of stool noted throughout the colon. Appendix is normal.  There is no bowel obstr
uction.

 

Lymph Nodes: There is no mesenteric lymphadenopathy.  There is no retroperitoneal lymphadenopathy.

 

CT pelvis:

 

Rectum: Unremarkable.

 

Urinary bladder: Unremarkable.

 

Uterus appears unremarkable. No adnexal masses.

 

Lymph nodes: There is no iliac lymphadenopathy. There is no inguinal lymphadenopathy.

 

Bone: No aggressive osseous lesions. 

 

IMPRESSION:

 

1.  Non-specific mildly prominent air and fluid-filled small bowel loops, which can be seen with ent
eritis. Moderate amount of stool throughout the colon. No bowel obstruction. Normal appendix.

 

2. Status post cholecystectomy.

 

RPTAT: [facility]

_____________________________________________ 

Physician Cate           Date    Time 

Electronically viewed and signed by Abeba Alicea Physician on 12/24/2017 14:51 

 

D:  12/24/2017 14:51  T:  12/24/2017 14:51

PH/

## 2017-12-31 ENCOUNTER — HOSPITAL ENCOUNTER (EMERGENCY)
Age: 32
Discharge: HOME | End: 2017-12-31

## 2017-12-31 ENCOUNTER — HOSPITAL ENCOUNTER (EMERGENCY)
Dept: HOSPITAL 91 - FTE | Age: 32
Discharge: HOME | End: 2017-12-31
Payer: MEDICARE

## 2017-12-31 DIAGNOSIS — G89.4: Primary | ICD-10-CM

## 2017-12-31 PROCEDURE — 99282 EMERGENCY DEPT VISIT SF MDM: CPT

## 2018-01-10 ENCOUNTER — OFFICE (OUTPATIENT)
Dept: URBAN - METROPOLITAN AREA CLINIC 33 | Facility: CLINIC | Age: 33
End: 2018-01-10

## 2018-01-10 ENCOUNTER — HOSPITAL ENCOUNTER (EMERGENCY)
Dept: HOSPITAL 54 - ER | Age: 33
Discharge: LEFT BEFORE DISCHARGE | End: 2018-01-10
Payer: MEDICARE

## 2018-01-10 VITALS
SYSTOLIC BLOOD PRESSURE: 91 MMHG | DIASTOLIC BLOOD PRESSURE: 68 MMHG | HEIGHT: 65 IN | WEIGHT: 110 LBS | HEART RATE: 90 BPM

## 2018-01-10 DIAGNOSIS — K86.1 PANCREATITIS, CHRONIC: ICD-10-CM

## 2018-01-10 DIAGNOSIS — Z53.21: Primary | ICD-10-CM

## 2018-01-10 PROCEDURE — 99205 OFFICE O/P NEW HI 60 MIN: CPT | Performed by: INTERNAL MEDICINE

## 2018-01-10 PROCEDURE — 82272 OCCULT BLD FECES 1-3 TESTS: CPT | Performed by: INTERNAL MEDICINE

## 2018-01-10 NOTE — SERVICEHPINOTES
This patient has a long and complicated history of chronic relapsing pancreatitis and has seen multiple physicians at [facility], [facility], [facility] [place] [facility] in [place]. For a year, Dr England at [facility] put in a pancreatic stent but this never helped her pain. She states that she has an appetite but is unable to eat because of severe esophagitis. A G tube had been put in place and she continued to have pain. The patient currently has a port and gets periodic albumin.

## 2018-01-26 ENCOUNTER — HOSPITAL ENCOUNTER (EMERGENCY)
Dept: HOSPITAL 1 - ED | Age: 33
Discharge: HOME | End: 2018-01-26
Payer: COMMERCIAL

## 2018-01-26 VITALS — DIASTOLIC BLOOD PRESSURE: 73 MMHG | SYSTOLIC BLOOD PRESSURE: 118 MMHG

## 2018-01-26 DIAGNOSIS — G43.909: Primary | ICD-10-CM

## 2018-01-26 DIAGNOSIS — R11.2: ICD-10-CM

## 2018-01-26 DIAGNOSIS — H53.149: ICD-10-CM

## 2018-03-01 ENCOUNTER — HOSPITAL ENCOUNTER (EMERGENCY)
Dept: HOSPITAL 4 - SED | Age: 33
Discharge: HOME | End: 2018-03-01
Payer: COMMERCIAL

## 2018-03-01 VITALS — BODY MASS INDEX: 18.33 KG/M2 | HEIGHT: 65 IN | WEIGHT: 110 LBS

## 2018-03-01 VITALS — SYSTOLIC BLOOD PRESSURE: 110 MMHG

## 2018-03-01 VITALS — SYSTOLIC BLOOD PRESSURE: 112 MMHG

## 2018-03-01 DIAGNOSIS — Z88.0: ICD-10-CM

## 2018-03-01 DIAGNOSIS — Z88.6: ICD-10-CM

## 2018-03-01 DIAGNOSIS — Z88.2: ICD-10-CM

## 2018-03-01 DIAGNOSIS — Z90.49: ICD-10-CM

## 2018-03-01 DIAGNOSIS — Z88.5: ICD-10-CM

## 2018-03-01 DIAGNOSIS — Z91.040: ICD-10-CM

## 2018-03-01 DIAGNOSIS — Z88.8: ICD-10-CM

## 2018-03-01 DIAGNOSIS — Z76.5: ICD-10-CM

## 2018-03-01 DIAGNOSIS — K59.00: Primary | ICD-10-CM

## 2018-03-01 LAB
ALBUMIN SERPL BCP-MCNC: 3.6 G/DL (ref 3.4–4.8)
ALT SERPL W P-5'-P-CCNC: 18 U/L (ref 12–78)
ANION GAP SERPL CALCULATED.3IONS-SCNC: 5 MMOL/L (ref 5–15)
APPEARANCE UR: CLEAR
AST SERPL W P-5'-P-CCNC: 17 U/L (ref 10–37)
BASOPHILS # BLD AUTO: 0 K/UL (ref 0–0.2)
BASOPHILS NFR BLD AUTO: 0.2 % (ref 0–2)
BILIRUB SERPL-MCNC: 0.4 MG/DL (ref 0–1)
BILIRUB UR QL STRIP: NEGATIVE
BUN SERPL-MCNC: 9 MG/DL (ref 8–21)
CALCIUM SERPL-MCNC: 8.5 MG/DL (ref 8.4–11)
CHLORIDE SERPL-SCNC: 108 MMOL/L (ref 98–107)
COLOR UR: YELLOW
CREAT SERPL-MCNC: 0.56 MG/DL (ref 0.55–1.3)
EOSINOPHIL # BLD AUTO: 0.1 K/UL (ref 0–0.4)
EOSINOPHIL NFR BLD AUTO: 1.8 % (ref 0–4)
ERYTHROCYTE [DISTWIDTH] IN BLOOD BY AUTOMATED COUNT: 13.5 % (ref 9–15)
GFR SERPL CREATININE-BSD FRML MDRD: 160 ML/MIN (ref 90–?)
GLUCOSE SERPL-MCNC: 82 MG/DL (ref 70–99)
GLUCOSE UR STRIP-MCNC: NEGATIVE MG/DL
HCT VFR BLD AUTO: 26.9 % (ref 36–48)
HGB BLD-MCNC: 8.9 G/DL (ref 12–16)
HGB UR QL STRIP: NEGATIVE
KETONES UR STRIP-MCNC: NEGATIVE MG/DL
LEUKOCYTE ESTERASE UR QL STRIP: NEGATIVE
LIPASE SERPL-CCNC: 173 U/L (ref 73–393)
LYMPHOCYTES # BLD AUTO: 2.1 K/UL (ref 1–5.5)
LYMPHOCYTES NFR BLD AUTO: 42.1 % (ref 20.5–51.5)
MCH RBC QN AUTO: 29 PG (ref 27–31)
MCHC RBC AUTO-ENTMCNC: 33 % (ref 32–36)
MCV RBC AUTO: 88 FL (ref 79–98)
MONOCYTES # BLD MANUAL: 0.4 K/UL (ref 0–1)
MONOCYTES # BLD MANUAL: 7.1 % (ref 1.7–9.3)
NEUTROPHILS # BLD AUTO: 2.4 K/UL (ref 1.8–7.7)
NEUTROPHILS NFR BLD AUTO: 48.8 % (ref 40–70)
NITRITE UR QL STRIP: NEGATIVE
PH UR STRIP: 5.5 [PH] (ref 5–8)
PLATELET # BLD AUTO: 290 K/UL (ref 130–430)
POTASSIUM SERPL-SCNC: 3.8 MMOL/L (ref 3.5–5.1)
PROT UR QL STRIP: NEGATIVE
RBC # BLD AUTO: 3.05 MIL/UL (ref 4.2–6.2)
SODIUM SERPLBLD-SCNC: 139 MMOL/L (ref 136–145)
SP GR UR STRIP: >=1.03 (ref 1–1.03)
UROBILINOGEN UR STRIP-MCNC: 0.2 MG/DL (ref 0.2–1)
WBC # BLD AUTO: 5 K/UL (ref 4.8–10.8)

## 2018-03-01 PROCEDURE — 81003 URINALYSIS AUTO W/O SCOPE: CPT

## 2018-03-01 PROCEDURE — 36415 COLL VENOUS BLD VENIPUNCTURE: CPT

## 2018-03-01 PROCEDURE — 74176 CT ABD & PELVIS W/O CONTRAST: CPT

## 2018-03-01 PROCEDURE — 96376 TX/PRO/DX INJ SAME DRUG ADON: CPT

## 2018-03-01 PROCEDURE — 96375 TX/PRO/DX INJ NEW DRUG ADDON: CPT

## 2018-03-01 PROCEDURE — 96374 THER/PROPH/DIAG INJ IV PUSH: CPT

## 2018-03-01 PROCEDURE — 83690 ASSAY OF LIPASE: CPT

## 2018-03-01 PROCEDURE — 80053 COMPREHEN METABOLIC PANEL: CPT

## 2018-03-01 PROCEDURE — 85025 COMPLETE CBC W/AUTO DIFF WBC: CPT

## 2018-03-01 PROCEDURE — 99285 EMERGENCY DEPT VISIT HI MDM: CPT

## 2018-03-01 PROCEDURE — 96361 HYDRATE IV INFUSION ADD-ON: CPT

## 2018-03-01 NOTE — NUR
Patient's discharge papers were found in the trash bin when cleaning the gurney 
from which the patient was discharged. Incident reported to Charge Nurse Kyle HUBER and papers put into shredding bin.

## 2018-03-01 NOTE — NUR
Patient found walking in hallway and speaking to other patient, re-educated and 
reiterated safety and fall precautions after morphine administration. Patient 
assisted back to bed. Will continue to monitor.

## 2018-03-01 NOTE — NUR
Pt arrived with upper right chest leonides-cath. Pt gave consent to use and MD hernandez for use for blood drawl and med infusion.

## 2018-03-01 NOTE — NUR
Patient given written and verbal discharge instructions and verbalizes 
understanding. ER MD discussed with patient the results and treatment provided. 
Patient in stable condition. ID arm band removed. Rx of Bisacodyl given. 
Patient educated on pain management and to follow up with PMD. Pain Scale 3/10. 
Opportunity for questions provided and answered. Medication side effect fact 
sheet provided.

## 2018-03-14 ENCOUNTER — HOSPITAL ENCOUNTER (EMERGENCY)
Dept: HOSPITAL 36 - ER | Age: 33
Discharge: LEFT BEFORE DISCHARGE | End: 2018-03-14
Payer: SELF-PAY

## 2018-03-14 DIAGNOSIS — Z87.11: ICD-10-CM

## 2018-03-14 DIAGNOSIS — K21.9: ICD-10-CM

## 2018-03-14 DIAGNOSIS — R10.9: Primary | ICD-10-CM

## 2018-03-14 LAB
ALBUMIN SERPL-MCNC: 4 GM/DL (ref 3.7–5.3)
ALBUMIN/GLOB SERPL: 1.8 {RATIO} (ref 1–1.8)
ALP SERPL-CCNC: 57 U/L (ref 34–104)
ALT SERPL-CCNC: 12 U/L (ref 7–52)
ANION GAP SERPL CALC-SCNC: 8 MMOL/L (ref 7–16)
APPEARANCE UR: CLEAR
AST SERPL-CCNC: 17 U/L (ref 13–39)
BACTERIA #/AREA URNS HPF: (no result) /HPF
BASOPHILS # BLD AUTO: 0 TH/CUMM (ref 0–0.2)
BASOPHILS NFR BLD AUTO: 0.5 % (ref 0–2)
BILIRUB SERPL-MCNC: 0.2 MG/DL (ref 0.3–1)
BILIRUB UR-MCNC: NEGATIVE MG/DL
BUN SERPL-MCNC: 10 MG/DL (ref 7–25)
CALCIUM SERPL-MCNC: 8.9 MG/DL (ref 8.6–10.3)
CHLORIDE SERPL-SCNC: 105 MEQ/L (ref 98–107)
CO2 SERPL-SCNC: 25.5 MEQ/L (ref 21–31)
COLOR UR: YELLOW
CREAT SERPL-MCNC: 0.7 MG/DL (ref 0.6–1.2)
EOSINOPHIL # BLD AUTO: 0.2 TH/CMM (ref 0.1–0.4)
EOSINOPHIL NFR BLD AUTO: 4 % (ref 0–5)
EPI CELLS URNS QL MICRO: (no result) /LPF
ERYTHROCYTE [DISTWIDTH] IN BLOOD BY AUTOMATED COUNT: 14.1 % (ref 11.5–20)
GLOBULIN SER-MCNC: 2.2 GM/DL
GLUCOSE SERPL-MCNC: 128 MG/DL (ref 70–105)
GLUCOSE UR STRIP-MCNC: NEGATIVE MG/DL
HCT VFR BLD CALC: 27.1 % (ref 41–60)
HGB BLD-MCNC: 8.9 GM/DL (ref 12–16)
KETONES UR STRIP-MCNC: NEGATIVE MG/DL
LEUKOCYTE ESTERASE UR-ACNC: NEGATIVE
LIPASE SERPL-CCNC: 74 U/L (ref 11–82)
LYMPHOCYTE AB SER FC-ACNC: 2.3 TH/CMM (ref 1.5–3)
LYMPHOCYTES NFR BLD AUTO: 51.8 % (ref 20–50)
MCH RBC QN AUTO: 28.9 PG (ref 27–31)
MCHC RBC AUTO-ENTMCNC: 32.9 PG (ref 28–36)
MCV RBC AUTO: 87.9 FL (ref 81–100)
MICRO URNS: YES
MONOCYTES # BLD AUTO: 0.3 TH/CMM (ref 0.3–1)
MONOCYTES NFR BLD AUTO: 7.3 % (ref 2–10)
NEUTROPHILS # BLD: 1.6 TH/CMM (ref 1.8–8)
NEUTROPHILS NFR BLD AUTO: 36.4 % (ref 40–80)
NITRITE UR QL STRIP: NEGATIVE
PH UR STRIP: 6 [PH] (ref 4.6–8)
PLATELET # BLD: 289 TH/CMM (ref 150–400)
PMV BLD AUTO: 6.3 FL
POTASSIUM SERPL-SCNC: 3.5 MEQ/L (ref 3.5–5.1)
PROT UR STRIP-MCNC: NEGATIVE MG/DL
RBC # BLD AUTO: 3.08 MIL/CMM (ref 3.8–5.1)
RBC # UR STRIP: NEGATIVE /UL
RBC #/AREA URNS HPF: (no result) /HPF (ref 0–5)
SODIUM SERPL-SCNC: 135 MEQ/L (ref 136–145)
SP GR UR STRIP: 1.02 (ref 1–1.03)
URINALYSIS COMPLETE PNL UR: (no result)
UROBILINOGEN UR STRIP-ACNC: 0.2 E.U./DL (ref 0.2–1)
WBC # BLD AUTO: 4.4 TH/CMM (ref 4.8–10.8)
WBC #/AREA URNS HPF: (no result) /HPF (ref 0–5)

## 2018-04-16 ENCOUNTER — HOSPITAL ENCOUNTER (INPATIENT)
Dept: HOSPITAL 4 - SED | Age: 33
LOS: 2 days | Discharge: LEFT BEFORE DISCHARGE | DRG: 372 | End: 2018-04-18
Attending: INTERNAL MEDICINE | Admitting: INTERNAL MEDICINE
Payer: COMMERCIAL

## 2018-04-16 VITALS — SYSTOLIC BLOOD PRESSURE: 104 MMHG

## 2018-04-16 VITALS — HEIGHT: 65 IN | BODY MASS INDEX: 18.33 KG/M2 | WEIGHT: 110 LBS

## 2018-04-16 VITALS — SYSTOLIC BLOOD PRESSURE: 106 MMHG

## 2018-04-16 DIAGNOSIS — Z88.5: ICD-10-CM

## 2018-04-16 DIAGNOSIS — Z88.8: ICD-10-CM

## 2018-04-16 DIAGNOSIS — Z88.1: ICD-10-CM

## 2018-04-16 DIAGNOSIS — F68.10: ICD-10-CM

## 2018-04-16 DIAGNOSIS — Z96.89: ICD-10-CM

## 2018-04-16 DIAGNOSIS — G89.4: ICD-10-CM

## 2018-04-16 DIAGNOSIS — Z88.0: ICD-10-CM

## 2018-04-16 DIAGNOSIS — Z88.6: ICD-10-CM

## 2018-04-16 DIAGNOSIS — F41.9: ICD-10-CM

## 2018-04-16 DIAGNOSIS — Z90.49: ICD-10-CM

## 2018-04-16 DIAGNOSIS — F60.9: ICD-10-CM

## 2018-04-16 DIAGNOSIS — K75.4: ICD-10-CM

## 2018-04-16 DIAGNOSIS — Z53.21: ICD-10-CM

## 2018-04-16 DIAGNOSIS — Z91.040: ICD-10-CM

## 2018-04-16 DIAGNOSIS — A04.71: Primary | ICD-10-CM

## 2018-04-16 DIAGNOSIS — N39.0: ICD-10-CM

## 2018-04-16 DIAGNOSIS — K21.9: ICD-10-CM

## 2018-04-16 DIAGNOSIS — K86.1: ICD-10-CM

## 2018-04-16 LAB
ALBUMIN SERPL BCP-MCNC: 3.6 G/DL (ref 3.4–4.8)
ALT SERPL W P-5'-P-CCNC: 25 U/L (ref 12–78)
AMORPH SED URNS QL MICRO: (no result) /HPF
ANION GAP SERPL CALCULATED.3IONS-SCNC: 7 MMOL/L (ref 5–15)
APPEARANCE UR: (no result)
AST SERPL W P-5'-P-CCNC: 18 U/L (ref 10–37)
BACTERIA URNS QL MICRO: (no result) /HPF
BASOPHILS # BLD AUTO: 0.1 K/UL (ref 0–0.2)
BASOPHILS NFR BLD AUTO: 2.6 % (ref 0–2)
BILIRUB SERPL-MCNC: 0.2 MG/DL (ref 0–1)
BILIRUB UR QL STRIP: NEGATIVE
BUN SERPL-MCNC: 13 MG/DL (ref 8–21)
CALCIUM SERPL-MCNC: 9.6 MG/DL (ref 8.4–11)
CHLORIDE SERPL-SCNC: 104 MMOL/L (ref 98–107)
COLOR UR: YELLOW
CREAT SERPL-MCNC: 0.72 MG/DL (ref 0.55–1.3)
EOSINOPHIL # BLD AUTO: 0.1 K/UL (ref 0–0.4)
EOSINOPHIL NFR BLD AUTO: 1.2 % (ref 0–4)
ERYTHROCYTE [DISTWIDTH] IN BLOOD BY AUTOMATED COUNT: 16.2 % (ref 9–15)
GFR SERPL CREATININE-BSD FRML MDRD: 120 ML/MIN (ref 90–?)
GLUCOSE SERPL-MCNC: 103 MG/DL (ref 70–99)
GLUCOSE UR STRIP-MCNC: NEGATIVE MG/DL
HCT VFR BLD AUTO: 33.8 % (ref 36–48)
HGB BLD-MCNC: 11.3 G/DL (ref 12–16)
HGB UR QL STRIP: NEGATIVE
INR PPP: 1 (ref 0.8–1.2)
KETONES UR STRIP-MCNC: NEGATIVE MG/DL
LEUKOCYTE ESTERASE UR QL STRIP: NEGATIVE
LIPASE SERPL-CCNC: 457 U/L (ref 73–393)
LYMPHOCYTES # BLD AUTO: 1.4 K/UL (ref 1–5.5)
LYMPHOCYTES NFR BLD AUTO: 32.2 % (ref 20.5–51.5)
MCH RBC QN AUTO: 29 PG (ref 27–31)
MCHC RBC AUTO-ENTMCNC: 34 % (ref 32–36)
MCV RBC AUTO: 86 FL (ref 79–98)
MONOCYTES # BLD MANUAL: 0.4 K/UL (ref 0–1)
MONOCYTES # BLD MANUAL: 8.5 % (ref 1.7–9.3)
MUCOUS THREADS URNS QL MICRO: (no result) /LPF
NEUTROPHILS # BLD AUTO: 2.4 K/UL (ref 1.8–7.7)
NEUTROPHILS NFR BLD AUTO: 55.5 % (ref 40–70)
NITRITE UR QL STRIP: NEGATIVE
PH UR STRIP: 7.5 [PH] (ref 5–8)
PLATELET # BLD AUTO: 533 K/UL (ref 130–430)
POTASSIUM SERPL-SCNC: 4 MMOL/L (ref 3.5–5.1)
PROT UR QL STRIP: (no result)
PROTHROMBIN TIME: 10.4 SECS (ref 9.5–12.5)
RBC # BLD AUTO: 3.92 MIL/UL (ref 4.2–6.2)
RBC #/AREA URNS HPF: (no result) /HPF (ref 0–3)
SODIUM SERPLBLD-SCNC: 139 MMOL/L (ref 136–145)
SP GR UR STRIP: 1.01 (ref 1–1.03)
UROBILINOGEN UR STRIP-MCNC: 0.2 MG/DL (ref 0.2–1)
WBC # BLD AUTO: 4.4 K/UL (ref 4.8–10.8)
WBC #/AREA URNS HPF: (no result) /HPF (ref 0–3)

## 2018-04-16 RX ADMIN — DEXTROSE AND SODIUM CHLORIDE SCH MLS/HR: 5; 900 INJECTION, SOLUTION INTRAVENOUS at 23:59

## 2018-04-16 RX ADMIN — VANCOMYCIN HYDROCHLORIDE SCH MG: 250 CAPSULE ORAL at 22:15

## 2018-04-17 VITALS — SYSTOLIC BLOOD PRESSURE: 100 MMHG

## 2018-04-17 VITALS — SYSTOLIC BLOOD PRESSURE: 97 MMHG

## 2018-04-17 VITALS — SYSTOLIC BLOOD PRESSURE: 128 MMHG

## 2018-04-17 RX ADMIN — MORPHINE SULFATE PRN MG: 4 INJECTION INTRAVENOUS at 00:28

## 2018-04-17 RX ADMIN — MORPHINE SULFATE PRN MG: 4 INJECTION INTRAVENOUS at 04:25

## 2018-04-17 RX ADMIN — DEXTROSE AND SODIUM CHLORIDE SCH MLS/HR: 5; 900 INJECTION, SOLUTION INTRAVENOUS at 11:45

## 2018-04-17 RX ADMIN — MORPHINE SULFATE PRN MG: 4 INJECTION INTRAVENOUS at 20:53

## 2018-04-17 RX ADMIN — DIPHENHYDRAMINE HYDROCHLORIDE PRN MG: 50 INJECTION, SOLUTION INTRAMUSCULAR; INTRAVENOUS at 17:48

## 2018-04-17 RX ADMIN — MORPHINE SULFATE PRN MG: 4 INJECTION INTRAVENOUS at 08:52

## 2018-04-17 RX ADMIN — MORPHINE SULFATE PRN MG: 4 INJECTION INTRAVENOUS at 12:55

## 2018-04-17 RX ADMIN — DEXTROSE AND SODIUM CHLORIDE SCH MLS/HR: 5; 900 INJECTION, SOLUTION INTRAVENOUS at 18:15

## 2018-04-17 RX ADMIN — DIPHENHYDRAMINE HYDROCHLORIDE PRN MG: 50 INJECTION, SOLUTION INTRAMUSCULAR; INTRAVENOUS at 11:45

## 2018-04-17 RX ADMIN — VANCOMYCIN HYDROCHLORIDE SCH MG: 250 CAPSULE ORAL at 08:57

## 2018-04-17 RX ADMIN — MORPHINE SULFATE PRN MG: 4 INJECTION INTRAVENOUS at 16:45

## 2018-04-17 RX ADMIN — Medication SCH CAP: at 20:54

## 2018-04-17 RX ADMIN — DIPHENHYDRAMINE HYDROCHLORIDE SCH MG: 50 INJECTION, SOLUTION INTRAMUSCULAR; INTRAVENOUS at 21:18

## 2018-04-17 RX ADMIN — METRONIDAZOLE SCH MLS/HR: 500 SOLUTION INTRAVENOUS at 21:19

## 2018-04-18 VITALS — SYSTOLIC BLOOD PRESSURE: 105 MMHG

## 2018-04-18 RX ADMIN — DIPHENHYDRAMINE HYDROCHLORIDE SCH MG: 50 INJECTION, SOLUTION INTRAMUSCULAR; INTRAVENOUS at 06:10

## 2018-04-18 RX ADMIN — DEXTROSE AND SODIUM CHLORIDE SCH MLS/HR: 5; 900 INJECTION, SOLUTION INTRAVENOUS at 04:15

## 2018-04-18 RX ADMIN — DEXTROSE AND SODIUM CHLORIDE SCH MLS/HR: 5; 900 INJECTION, SOLUTION INTRAVENOUS at 13:30

## 2018-04-18 RX ADMIN — MORPHINE SULFATE PRN MG: 4 INJECTION INTRAVENOUS at 13:32

## 2018-04-18 RX ADMIN — MORPHINE SULFATE PRN MG: 4 INJECTION INTRAVENOUS at 09:44

## 2018-04-18 RX ADMIN — Medication SCH CAP: at 08:55

## 2018-04-18 RX ADMIN — METRONIDAZOLE SCH MLS/HR: 500 SOLUTION INTRAVENOUS at 06:10

## 2018-04-18 RX ADMIN — METRONIDAZOLE SCH MLS/HR: 500 SOLUTION INTRAVENOUS at 13:31

## 2018-04-18 RX ADMIN — MORPHINE SULFATE PRN MG: 4 INJECTION INTRAVENOUS at 05:40

## 2018-04-18 RX ADMIN — MORPHINE SULFATE PRN MG: 4 INJECTION INTRAVENOUS at 01:36

## 2018-05-10 ENCOUNTER — HOSPITAL ENCOUNTER (INPATIENT)
Dept: HOSPITAL 36 - ER | Age: 33
LOS: 1 days | Discharge: LEFT BEFORE DISCHARGE | DRG: 641 | End: 2018-05-11
Attending: GENERAL PRACTICE | Admitting: GENERAL PRACTICE
Payer: MEDICARE

## 2018-05-10 DIAGNOSIS — Z88.8: ICD-10-CM

## 2018-05-10 DIAGNOSIS — Z88.0: ICD-10-CM

## 2018-05-10 DIAGNOSIS — M54.5: ICD-10-CM

## 2018-05-10 DIAGNOSIS — Z88.5: ICD-10-CM

## 2018-05-10 DIAGNOSIS — Z82.49: ICD-10-CM

## 2018-05-10 DIAGNOSIS — E83.42: ICD-10-CM

## 2018-05-10 DIAGNOSIS — Z90.49: ICD-10-CM

## 2018-05-10 DIAGNOSIS — I95.9: ICD-10-CM

## 2018-05-10 DIAGNOSIS — E87.6: Primary | ICD-10-CM

## 2018-05-10 DIAGNOSIS — R51: ICD-10-CM

## 2018-05-10 DIAGNOSIS — Z88.2: ICD-10-CM

## 2018-05-10 LAB
ANION GAP SERPL CALC-SCNC: 13 MMOL/L (ref 7–16)
APPEARANCE UR: CLEAR
BACTERIA #/AREA URNS HPF: (no result) /HPF
BASOPHILS # BLD AUTO: 0 TH/CUMM (ref 0–0.2)
BASOPHILS NFR BLD AUTO: 0 % (ref 0–2)
BILIRUB UR-MCNC: NEGATIVE MG/DL
BUN SERPL-MCNC: 10 MG/DL (ref 7–25)
CALCIUM SERPL-MCNC: 8.2 MG/DL (ref 8.6–10.3)
CHLORIDE SERPL-SCNC: 108 MEQ/L (ref 98–107)
CO2 SERPL-SCNC: 21.2 MEQ/L (ref 21–31)
COLOR UR: YELLOW
CREAT SERPL-MCNC: 0.7 MG/DL (ref 0.6–1.2)
EOSINOPHIL # BLD AUTO: 0 TH/CMM (ref 0.1–0.4)
EOSINOPHIL NFR BLD AUTO: 0.7 % (ref 0–5)
EPI CELLS URNS QL MICRO: (no result) /LPF
ERYTHROCYTE [DISTWIDTH] IN BLOOD BY AUTOMATED COUNT: 17 % (ref 11.5–20)
GLUCOSE SERPL-MCNC: 184 MG/DL (ref 70–105)
GLUCOSE UR STRIP-MCNC: NEGATIVE MG/DL
HBA1C MFR BLD: 4.7 % (ref 4–6)
HCT VFR BLD CALC: 27.8 % (ref 41–60)
HGB BLD-MCNC: 10 G/DL (ref 4–35)
HGB BLD-MCNC: 9.4 GM/DL (ref 12–16)
KETONES UR STRIP-MCNC: NEGATIVE MG/DL
LEUKOCYTE ESTERASE UR-ACNC: NEGATIVE
LIPASE SERPL-CCNC: 85 U/L (ref 11–82)
LYMPHOCYTE AB SER FC-ACNC: 0.6 TH/CMM (ref 1.5–3)
LYMPHOCYTES NFR BLD AUTO: 10 % (ref 20–50)
MAGNESIUM SERPL-MCNC: 1.5 MG/DL (ref 1.9–2.7)
MCH RBC QN AUTO: 28.7 PG (ref 27–31)
MCHC RBC AUTO-ENTMCNC: 33.9 PG (ref 28–36)
MCV RBC AUTO: 84.7 FL (ref 81–100)
MICRO URNS: YES
MONOCYTES # BLD AUTO: 0 TH/CMM (ref 0.3–1)
MONOCYTES NFR BLD AUTO: 0.7 % (ref 2–10)
NEUTROPHILS # BLD: 5.3 TH/CMM (ref 1.8–8)
NEUTROPHILS NFR BLD AUTO: 88.6 % (ref 40–80)
NITRITE UR QL STRIP: NEGATIVE
PH UR STRIP: 5.5 [PH] (ref 4.6–8)
PLATELET # BLD: 168 TH/CMM (ref 150–400)
PMV BLD AUTO: 6.2 FL
POTASSIUM SERPL-SCNC: 3.2 MEQ/L (ref 3.5–5.1)
PROT UR STRIP-MCNC: NEGATIVE MG/DL
RBC # BLD AUTO: 3.28 MIL/CMM (ref 3.8–5.1)
RBC # UR STRIP: NEGATIVE /UL
RBC #/AREA URNS HPF: (no result) /HPF (ref 0–5)
SODIUM SERPL-SCNC: 139 MEQ/L (ref 136–145)
SP GR UR STRIP: 1.02 (ref 1–1.03)
URINALYSIS COMPLETE PNL UR: (no result)
UROBILINOGEN UR STRIP-ACNC: 0.2 E.U./DL (ref 0.2–1)
WBC # BLD AUTO: 5.9 TH/CMM (ref 4.8–10.8)
WBC #/AREA URNS HPF: (no result) /HPF (ref 0–5)

## 2018-05-10 NOTE — ED PHYSICIAN CHART
ED Chief Complaint/HPI





- Patient Information


Date Seen:: 05/10/18


Time Seen:: 16:25


Chief Complaint:: right low back pain


History of Present Illness:: 





Patient had onset 2 days ago of right low back pain which recurred this 

morning.  Her temperature was 100 orally this morning.  She also had onset of 

dysuria and right-sided headache this morning.  Headache now has also gone to 

the left side of her head.  This is her worst headache ever.  Patient vomited 

twice this am.  


Allergies:: 


 Allergies











Allergy/AdvReac Type Severity Reaction Status Date / Time


 


acetaminophen [From Tylenol] Allergy   Verified 03/14/18 06:16


 


aspirin Allergy   Verified 03/14/18 06:16


 


codeine Allergy   Verified 03/14/18 06:16


 


metoclopramide [From Reglan] Allergy   Verified 03/14/18 06:16


 


Penicillins [PCN] Allergy   Verified 03/14/18 06:16


 


prochlorperazine Allergy   Verified 03/14/18 06:16





[From Compazine]     


 


Sulfa (Sulfonamide Allergy   Verified 03/14/18 06:16





Antibiotics)     











Vitals:: 


 Vital Signs - 8 hr











  05/10/18





  16:05


 


Temp 98.1 F


 





 


RR 16


 


BP 93/59


 


O2 Sat % 99











Historian:: Patient


Review:: Nurse's Note Reviewed





ED Review of Systems





- Review of Systems


General/Constitutional: Fever


Skin: No skin lesions


Head: Headache


Eyes: Acuity change


ENT: No earache


Neck: No neck pain


Cardio Vascular: No chest pain


Pulmonary: No SOB


GI: No nausea, No vomiting, No diarrhea


G/U: Dysuria


Musculoskeletal: Back pain


Endocrine: No polyuria, No polydipsia


Psychiatric: No prior psych history





ED Past Medical History





- Past Medical History


Past Medical History: Other (tachycardia; pancreatitis; nonspecific autoimmune 

disease (involves pancreas and GI tract))


Family History: HTN


Social History: Non Smoker, No Alcohol


Surgical History: Cholecystectomy, other (breast implants; bile duct stent; 

stent right ureter at age 5)


Psychiatricy History: None


Medication Reviewed:: 





IV fluids only which she self administers.





Family Medical History





- Family Member


  ** Mother


Ethnicity: Non-





ED Physical Exam





- Physical Examination


General/Constitutional: Awake, Well-developed, well-nourished, Alert, No 

distress, GCS 15, Non-toxic appearing, Ambulatory


Head: Atraumatic


Eyes: Lids, conjuctiva normal, PERRL, EOMI


Skin: Nl inspection, No rash, No skin lesions, No ecchymosis, Well hydrated, No 

lymphadenopathy


ENMT: External ears, nose nl, Nasal exam nl, Lips, teeth, gums nl


Neck: Nontender, No JVD, No nuchal rigidity, No bruit, No mass, No stridor


Other Neck comments:: 





Review 5 forward flexion neck


Respiratory: Nl effort/Exclusion, Clear to Auscultation, No Wheeze/Rhonchi/Rales


Cardio Vascular: RRR, No murmur, gallop, rubs, NL S1 S2


GI: No organomegaly, No hernia, Normal BS's, Nondistended, No mass/bruits


Other GI comments:: 





Diffuse tenderness more on the right side


: No CVA tenderness


Extremities: No tenderness or effusion, Full ROM, normal strength in all 

extremities, No edema, Normal digits & nails


Neuro/Psych: Alert/oriented, DTR's symmetric, Normal sensory exam, Normal motor 

strength, Judgement/insight normal, Mood normal, Normal gait, No focal deficits


Misc: Normal back, No paraspinal tenderness





ED Septic Shock





- .


Is Septic Shock (SBP<90, OR Lactate>4 mmol\L) present?: No





- <6hrs of presentation:


Vital Signs: 


 Vital Signs - 8 hr











  05/10/18





  16:05


 


Temp 98.1 F


 





 


RR 16


 


BP 93/59


 


O2 Sat % 99














ED Reassessment (Disposition)





- Reassessment


Reassessment Condition:: Unchanged





- Diagnosis


Diagnosis:: 





Hypokalemia; hypomagnesemia; hypotension; cephalgia





- Patient Disposition


Admitted to:: Med/Surg


Admitting Medical Physician:: Tyshawn Balbuena


Condition at Disposition:: Stable, Unchanged

## 2018-05-11 NOTE — DIAGNOSTIC IMAGING REPORT
Ultrasound abdomen



HISTORY: Abdominal pain, provided history of cholecystectomy



COMPARISON: None



Technique: Sonography of the abdomen was performed in multiple planes.



FINDINGS:



Exam is limited due to body habitus.



The liver demonstrates mild increased echogenicity with no evidence of

focal lesions. The liver measures 16.9 cm. Gallbladder is not

visualized.  The Common bile duct measures 4 mm.



Evaluation of the pancreas is limited due to bowel gas. 



The right kidney measures 11.7 x 4.7 cm. No evidence of focal lesions or

hydronephrosis. The left kidney measures 10.7 x 4.5 cm.  No evidence of

focal lesions or hydronephrosis.



The spleen measures 13.7 cm.



IMPRESSION:



Nonvisualization of the gallbladder compatible with provided history of

cholecystectomy.



Borderline prominent liver with mild increased echogenicity which may be

due to underlying hepatocellular disease.



Mild splenomegaly.

## 2018-05-27 ENCOUNTER — HOSPITAL ENCOUNTER (EMERGENCY)
Dept: HOSPITAL 87 - ER | Age: 33
LOS: 1 days | Discharge: HOME | End: 2018-05-28
Payer: MEDICARE

## 2018-05-27 VITALS — HEIGHT: 64 IN | WEIGHT: 119.05 LBS | BODY MASS INDEX: 20.32 KG/M2

## 2018-05-27 DIAGNOSIS — K59.00: ICD-10-CM

## 2018-05-27 DIAGNOSIS — R10.84: Primary | ICD-10-CM

## 2018-05-27 DIAGNOSIS — Z88.2: ICD-10-CM

## 2018-05-27 DIAGNOSIS — R00.0: ICD-10-CM

## 2018-05-27 DIAGNOSIS — Z90.49: ICD-10-CM

## 2018-05-27 DIAGNOSIS — Z91.14: ICD-10-CM

## 2018-05-27 DIAGNOSIS — K62.5: ICD-10-CM

## 2018-05-27 LAB
APPEARANCE UR: CLEAR
BASOPHILS NFR BLD AUTO: 1 % (ref 0–2)
CHLORIDE SERPL-SCNC: 105 MEQ/L (ref 98–107)
COLOR UR: YELLOW
EOSINOPHIL NFR BLD AUTO: 0.8 % (ref 0–5)
ERYTHROCYTE [DISTWIDTH] IN BLOOD BY AUTOMATED COUNT: 18.5 % (ref 11.6–14.6)
HCT VFR BLD AUTO: 35.6 % (ref 36–48)
HGB BLD-MCNC: 11.9 G/DL (ref 12–16)
HGB UR QL STRIP: NEGATIVE
INR PPP: 1
KETONES UR STRIP-MCNC: NEGATIVE MG/DL
LEUKOCYTE ESTERASE UR QL STRIP: NEGATIVE
LYMPHOCYTES NFR BLD AUTO: 45 % (ref 20–50)
MCH RBC QN AUTO: 29.1 PG (ref 28–32)
MCV RBC AUTO: 87.2 FL (ref 81–99)
MONOCYTES NFR BLD AUTO: 3.8 % (ref 2–8)
NEUTROPHILS NFR BLD AUTO: 49.4 % (ref 40–76)
NITRITE UR QL STRIP: NEGATIVE
PH UR STRIP: 6.5 [PH] (ref 4.5–8)
PLATELET # BLD AUTO: 375 X1000/UL (ref 130–400)
PMV BLD AUTO: 7 FL (ref 7.4–10.4)
PROT UR QL STRIP: NEGATIVE
PROTHROMBIN TIME: 10.6 SEC (ref 9.4–11.6)
RBC # BLD AUTO: 4.08 MILL/UL (ref 4.2–5.4)
SP GR UR STRIP: 1.02 (ref 1–1.03)
UROBILINOGEN UR STRIP-MCNC: 0.2 E.U./DL (ref 0.2–1)

## 2018-05-27 PROCEDURE — 74176 CT ABD & PELVIS W/O CONTRAST: CPT

## 2018-05-27 PROCEDURE — 99285 EMERGENCY DEPT VISIT HI MDM: CPT

## 2018-05-27 PROCEDURE — 96372 THER/PROPH/DIAG INJ SC/IM: CPT

## 2018-05-27 PROCEDURE — 80053 COMPREHEN METABOLIC PANEL: CPT

## 2018-05-27 PROCEDURE — 36415 COLL VENOUS BLD VENIPUNCTURE: CPT

## 2018-05-27 PROCEDURE — 81003 URINALYSIS AUTO W/O SCOPE: CPT

## 2018-05-27 PROCEDURE — 85610 PROTHROMBIN TIME: CPT

## 2018-05-27 PROCEDURE — 93005 ELECTROCARDIOGRAM TRACING: CPT

## 2018-05-27 PROCEDURE — 85025 COMPLETE CBC W/AUTO DIFF WBC: CPT

## 2018-05-27 PROCEDURE — 96374 THER/PROPH/DIAG INJ IV PUSH: CPT

## 2018-05-27 PROCEDURE — 96375 TX/PRO/DX INJ NEW DRUG ADDON: CPT

## 2018-05-27 PROCEDURE — 83690 ASSAY OF LIPASE: CPT

## 2018-05-28 VITALS — SYSTOLIC BLOOD PRESSURE: 109 MMHG | DIASTOLIC BLOOD PRESSURE: 79 MMHG

## 2018-06-13 NOTE — ED PHYSICIAN CHART
ED Chief Complaint/HPI





- Patient Information


Date Seen:: 03/14/18


Time Seen:: 06:30


Chief Complaint:: Abdominal pain


History of Present Illness:: 


31 yo female had abdominal pain for 4 days. The pain is epigastric and RUQ with 

N/V. She also noticed blood on surface of stool during past 2 months. She had 

diarrhea and loose stool. She had malnutrition and receive TPN through a Power 

Port on her right chest. She last received TPN 4 months ago. Currently, she 

used vegan diet. She also received Pepcide and Zofran through [facility] Port via 

home health.


Allergies:: 


 Allergies











Allergy/AdvReac Type Severity Reaction Status Date / Time


 


acetaminophen [From Tylenol] Allergy   Verified 03/14/18 06:16


 


aspirin Allergy   Verified 03/14/18 06:16


 


codeine Allergy   Verified 03/14/18 06:16


 


metoclopramide [From Reglan] Allergy   Verified 03/14/18 06:16


 


Penicillins [PCN] Allergy   Verified 03/14/18 06:16


 


prochlorperazine Allergy   Verified 03/14/18 06:16





[From Compazine]     


 


Sulfa (Sulfonamide Allergy   Verified 03/14/18 06:16





Antibiotics)     











Vitals:: 


 Vital Signs - 8 hr











  03/14/18





  06:00


 


Temp 97.9 F


 





 


RR 18


 


/77


 


O2 Sat % 97














ED Past Medical History





- Past Medical History


Past Medical History: PUD/GERD, Other (pancreatitis)


Social History: Non Smoker, No Alcohol, No Drug Use


Surgical History: Cholecystectomy, other (ureter surgery)





Family Medical History





- Family Member


  ** Mother


Ethnicity: Non-





ED Labs/Radiology/EKG Results





- Lab Results


Results: 


 Laboratory Tests











  03/14/18





  06:25


 


POC Ur Pregnancy Test  Negative














ED Septic Shock





- <6hrs of presentation:


Vital Signs: 


 Vital Signs - 8 hr











  03/14/18





  06:00


 


Temp 97.9 F


 





 


RR 18


 


/77


 


O2 Sat % 97
English

## 2018-06-21 ENCOUNTER — HOSPITAL ENCOUNTER (EMERGENCY)
Dept: HOSPITAL 36 - ER | Age: 33
Discharge: LEFT BEFORE DISCHARGE | End: 2018-06-21
Payer: MEDICARE

## 2018-06-21 DIAGNOSIS — Z88.5: ICD-10-CM

## 2018-06-21 DIAGNOSIS — Z90.49: ICD-10-CM

## 2018-06-21 DIAGNOSIS — Z88.6: ICD-10-CM

## 2018-06-21 DIAGNOSIS — Z88.2: ICD-10-CM

## 2018-06-21 DIAGNOSIS — Z88.0: ICD-10-CM

## 2018-06-21 DIAGNOSIS — R10.84: Primary | ICD-10-CM

## 2018-06-21 DIAGNOSIS — Z88.8: ICD-10-CM

## 2018-06-21 DIAGNOSIS — R11.2: ICD-10-CM

## 2018-06-21 PROCEDURE — Z7502: HCPCS

## 2018-06-21 NOTE — ED PHYSICIAN CHART
ED Chief Complaint/HPI





- Patient Information


Date Seen:: 06/21/18


Time Seen:: 08:35


Chief Complaint:: abdominal pain


History of Present Illness:: 





Patient's had mid and lower abdominal pain and left flank pain for 3 days.  She 

vomited 3 times.  No diarrhea.  Patient thinks she has a kidney infection.


Allergies:: 


 Allergies











Allergy/AdvReac Type Severity Reaction Status Date / Time


 


acetaminophen [From Tylenol] Allergy   Verified 06/21/18 08:28


 


aspirin Allergy   Verified 06/21/18 08:28


 


codeine Allergy   Verified 06/21/18 08:28


 


metoclopramide [From Reglan] Allergy   Verified 06/21/18 08:28


 


Penicillins [PCN] Allergy   Verified 06/21/18 08:28


 


prochlorperazine Allergy   Verified 06/21/18 08:28





[From Compazine]     


 


Sulfa (Sulfonamide Allergy   Verified 06/21/18 08:28





Antibiotics)     











Vitals:: 


 Vital Signs - 8 hr











  06/21/18





  08:21


 


Temp 98.1 F


 


HR 82


 


RR 16


 


BP 95/69


 


O2 Sat % 98











Historian:: Patient


Review:: Nurse's Note Reviewed





ED Review of Systems





- Review of Systems


General/Constitutional: No fever, No chills, No weight loss, No weakness, No 

diaphoresis, No edema, No loss of appetite


Skin: No skin lesions, No rash, No bruising


Head: No headache, No light-headedness


Eyes: No loss of vision, No pain, No diplopia


ENT: No earache, No nasal drainage, No sore throat, No tinnitus


Neck: No neck pain, No swelling, No thyromegaly, No stiffness, No mass noted


Cardio Vascular: No chest pain, No palpitations, No PND, No orthopnea, No edema


Pulmonary: No SOB, No cough, No sputum, No wheezing


GI: Nausea, Vomiting, Pain, No melena, No hematochezia, No constipation, No 

hematemesis


G/U: No dysuria, No frequency, No hematuria


Musculoskeletal: No bone or joint pain, No back pain, No muscle pain


Endocrine: No polyuria, No polydipsia


Psychiatric: No prior psych history, No depression, No anxiety, No suicidal 

ideation


Hematopoietic: No bruising, No lymphadenopathy


Allergic/Immuno: No urticaria, No angioedema


Neurological: No syncope, No focal symptoms, No weakness, No paresthesia, No 

headache, No seizure, No dizziness, No confusion, No vertigo





ED Past Medical History





- Past Medical History


Past Medical History: Other (tachycardia; pancreatitis; lupus)


Social History: Non Smoker, No Alcohol


Surgical History: Cholecystectomy, other (tachycardia; acute pancreatitis; lupus

)


Psychiatricy History: None


Medication: Reviewed





Family Medical History





- Family Member


  ** Mother


Ethnicity: Non-





ED Physical Exam





- Physical Examination


General/Constitutional: Awake, Well-developed, well-nourished, Alert, No 

distress, GCS 15, Non-toxic appearing, Ambulatory


Head: Atraumatic


Eyes: Lids, conjuctiva normal, PERRL, EOMI


Skin: Nl inspection, No rash, No skin lesions, No ecchymosis, Well hydrated, No 

lymphadenopathy


ENMT: External ears, nose nl, Nasal exam nl, Lips, teeth, gums nl


Neck: Nontender, Full ROM w/o pain, No JVD, No nuchal rigidity, No bruit, No 

mass, No stridor


Respiratory: Nl effort/Exclusion, Clear to Auscultation, No Wheeze/Rhonchi/Rales


Cardio Vascular: RRR, No murmur, gallop, rubs, NL S1 S2


GI: No organomegaly, No hernia, Normal BS's, Nondistended, No mass/bruits


Other GI comments:: 





Diffuse abdominal tenderness without guarding


: No CVA tenderness


Extremities: No tenderness or effusion, Full ROM, normal strength in all 

extremities, No edema, Normal digits & nails


Neuro/Psych: Alert/oriented, DTR's symmetric, Normal sensory exam, Normal motor 

strength, Judgement/insight normal, Mood normal, Normal gait, No focal deficits


Misc: Normal back, No paraspinal tenderness





ED Assessment





- Assessment


General Assessment: 





I told patient I was concerned she might have pancreatitis and also wanted to 

do a urinalysis to rule out urinary tract infection.  Patient requested pain 

medication but I told her I would not give her either dilaudid or morphine (she 

is allergic to Toradol) and then she eloped before blood could be drawn or she 

could receive the 1 L of normal saline intravenously.





ED Septic Shock





- .


Is Septic Shock (SBP<90, OR Lactate>4 mmol\L) present?: No





- <6hrs of presentation:


Vital Signs: 


 Vital Signs - 8 hr











  06/21/18





  08:21


 


Temp 98.1 F


 


HR 82


 


RR 16


 


BP 95/69


 


O2 Sat % 98














ED Reassessment (Disposition)





- Reassessment


Reassessment Condition:: Unchanged





- Diagnosis


Diagnosis:: 





Abdominal pain





- Aftercare/Follow up Instructions


Aftercare/Follow-Up Instructions:: Refer to Discharge Instructions





- Patient Disposition


Discharge/Transfer:: Elope/AWOL


Condition at Disposition:: Unchanged

## 2018-07-02 ENCOUNTER — HOSPITAL ENCOUNTER (EMERGENCY)
Dept: HOSPITAL 87 - ER | Age: 33
Discharge: HOME | End: 2018-07-02
Payer: MEDICARE

## 2018-07-02 VITALS — DIASTOLIC BLOOD PRESSURE: 90 MMHG | SYSTOLIC BLOOD PRESSURE: 113 MMHG

## 2018-07-02 VITALS — HEIGHT: 65 IN | BODY MASS INDEX: 18.37 KG/M2 | WEIGHT: 110.23 LBS

## 2018-07-02 DIAGNOSIS — Z88.2: ICD-10-CM

## 2018-07-02 DIAGNOSIS — M32.9: ICD-10-CM

## 2018-07-02 DIAGNOSIS — R07.89: ICD-10-CM

## 2018-07-02 DIAGNOSIS — Z90.49: ICD-10-CM

## 2018-07-02 DIAGNOSIS — R10.13: Primary | ICD-10-CM

## 2018-07-02 DIAGNOSIS — G43.909: ICD-10-CM

## 2018-07-02 DIAGNOSIS — Z88.6: ICD-10-CM

## 2018-07-02 DIAGNOSIS — D64.9: ICD-10-CM

## 2018-07-02 DIAGNOSIS — K86.1: ICD-10-CM

## 2018-07-02 LAB
APPEARANCE UR: CLEAR
BASOPHILS NFR BLD AUTO: 0.4 % (ref 0–2)
CHLORIDE SERPL-SCNC: 110 MEQ/L (ref 98–107)
COLOR UR: YELLOW
EOSINOPHIL NFR BLD AUTO: 1.9 % (ref 0–5)
ERYTHROCYTE [DISTWIDTH] IN BLOOD BY AUTOMATED COUNT: 14.8 % (ref 11.6–14.6)
HCT VFR BLD AUTO: 34.4 % (ref 36–48)
HGB BLD-MCNC: 11.7 G/DL (ref 12–16)
HGB UR QL STRIP: NEGATIVE
INR PPP: 1.1
KETONES UR STRIP-MCNC: NEGATIVE MG/DL
LEUKOCYTE ESTERASE UR QL STRIP: NEGATIVE
LYMPHOCYTES NFR BLD AUTO: 43.2 % (ref 20–50)
MCH RBC QN AUTO: 31 PG (ref 28–32)
MCV RBC AUTO: 90.9 FL (ref 81–99)
MONOCYTES NFR BLD AUTO: 4.9 % (ref 2–8)
NEUTROPHILS NFR BLD AUTO: 49.6 % (ref 40–76)
NITRITE UR QL STRIP: NEGATIVE
PH UR STRIP: 5.5 [PH] (ref 4.5–8)
PLATELET # BLD AUTO: 173 X1000/UL (ref 130–400)
PMV BLD AUTO: 6.8 FL (ref 7.4–10.4)
PROT UR QL STRIP: NEGATIVE
PROTHROMBIN TIME: 11.6 SEC (ref 9.4–11.6)
RBC # BLD AUTO: 3.78 MILL/UL (ref 4.2–5.4)
SP GR UR STRIP: 1.01 (ref 1–1.03)
UROBILINOGEN UR STRIP-MCNC: 0.2 E.U./DL (ref 0.2–1)

## 2018-07-02 PROCEDURE — 96376 TX/PRO/DX INJ SAME DRUG ADON: CPT

## 2018-07-02 PROCEDURE — 85610 PROTHROMBIN TIME: CPT

## 2018-07-02 PROCEDURE — 96361 HYDRATE IV INFUSION ADD-ON: CPT

## 2018-07-02 PROCEDURE — 83690 ASSAY OF LIPASE: CPT

## 2018-07-02 PROCEDURE — 99285 EMERGENCY DEPT VISIT HI MDM: CPT

## 2018-07-02 PROCEDURE — 81003 URINALYSIS AUTO W/O SCOPE: CPT

## 2018-07-02 PROCEDURE — 85025 COMPLETE CBC W/AUTO DIFF WBC: CPT

## 2018-07-02 PROCEDURE — 81025 URINE PREGNANCY TEST: CPT

## 2018-07-02 PROCEDURE — 83880 ASSAY OF NATRIURETIC PEPTIDE: CPT

## 2018-07-02 PROCEDURE — 93005 ELECTROCARDIOGRAM TRACING: CPT

## 2018-07-02 PROCEDURE — 36415 COLL VENOUS BLD VENIPUNCTURE: CPT

## 2018-07-02 PROCEDURE — 80053 COMPREHEN METABOLIC PANEL: CPT

## 2018-07-02 PROCEDURE — 96374 THER/PROPH/DIAG INJ IV PUSH: CPT

## 2018-07-02 PROCEDURE — 84484 ASSAY OF TROPONIN QUANT: CPT

## 2018-07-02 PROCEDURE — 96375 TX/PRO/DX INJ NEW DRUG ADDON: CPT

## 2018-08-16 ENCOUNTER — HOSPITAL ENCOUNTER (EMERGENCY)
Dept: HOSPITAL 87 - ER | Age: 33
LOS: 1 days | Discharge: HOME | End: 2018-08-17
Payer: MEDICARE

## 2018-08-16 VITALS — HEIGHT: 65 IN | WEIGHT: 116.84 LBS | BODY MASS INDEX: 19.47 KG/M2

## 2018-08-16 DIAGNOSIS — R10.13: Primary | ICD-10-CM

## 2018-08-16 DIAGNOSIS — Z88.8: ICD-10-CM

## 2018-08-16 DIAGNOSIS — Z88.2: ICD-10-CM

## 2018-08-16 DIAGNOSIS — G43.909: ICD-10-CM

## 2018-08-16 DIAGNOSIS — R11.0: ICD-10-CM

## 2018-08-16 DIAGNOSIS — K27.9: ICD-10-CM

## 2018-08-16 DIAGNOSIS — Z88.6: ICD-10-CM

## 2018-08-16 PROCEDURE — 85025 COMPLETE CBC W/AUTO DIFF WBC: CPT

## 2018-08-16 PROCEDURE — 36415 COLL VENOUS BLD VENIPUNCTURE: CPT

## 2018-08-16 PROCEDURE — 96374 THER/PROPH/DIAG INJ IV PUSH: CPT

## 2018-08-16 PROCEDURE — 99284 EMERGENCY DEPT VISIT MOD MDM: CPT

## 2018-08-16 PROCEDURE — 81003 URINALYSIS AUTO W/O SCOPE: CPT

## 2018-08-16 PROCEDURE — 85610 PROTHROMBIN TIME: CPT

## 2018-08-16 PROCEDURE — 96376 TX/PRO/DX INJ SAME DRUG ADON: CPT

## 2018-08-16 PROCEDURE — 83690 ASSAY OF LIPASE: CPT

## 2018-08-16 PROCEDURE — 96375 TX/PRO/DX INJ NEW DRUG ADDON: CPT

## 2018-08-16 PROCEDURE — 80053 COMPREHEN METABOLIC PANEL: CPT

## 2018-08-17 VITALS — DIASTOLIC BLOOD PRESSURE: 72 MMHG | SYSTOLIC BLOOD PRESSURE: 112 MMHG

## 2018-08-17 LAB
APPEARANCE UR: CLEAR
BASOPHILS NFR BLD AUTO: 0.3 % (ref 0–2)
CHLORIDE SERPL-SCNC: 111 MEQ/L (ref 98–107)
COLOR UR: YELLOW
EOSINOPHIL NFR BLD AUTO: 1.7 % (ref 0–5)
ERYTHROCYTE [DISTWIDTH] IN BLOOD BY AUTOMATED COUNT: 13 % (ref 11.6–14.6)
HCT VFR BLD AUTO: 30 % (ref 36–48)
HGB BLD-MCNC: 10.9 G/DL (ref 12–16)
HGB UR QL STRIP: NEGATIVE
INR PPP: 1.1
KETONES UR STRIP-MCNC: NEGATIVE MG/DL
LEUKOCYTE ESTERASE UR QL STRIP: NEGATIVE
LYMPHOCYTES NFR BLD AUTO: 55.3 % (ref 20–50)
MCH RBC QN AUTO: 31.3 PG (ref 28–32)
MCV RBC AUTO: 90.4 FL (ref 81–99)
MONOCYTES NFR BLD AUTO: 6.9 % (ref 2–8)
NEUTROPHILS NFR BLD AUTO: 35.8 % (ref 40–76)
NITRITE UR QL STRIP: NEGATIVE
PH UR STRIP: 5 [PH] (ref 4.5–8)
PLATELET # BLD AUTO: 175 X1000/UL (ref 130–400)
PMV BLD AUTO: 7 FL (ref 7.4–10.4)
PROT UR QL STRIP: NEGATIVE
PROTHROMBIN TIME: 11.1 SEC (ref 9.1–11.1)
RBC # BLD AUTO: 3.32 MILL/UL (ref 4.2–5.4)
SP GR UR STRIP: 1.01 (ref 1–1.03)
UROBILINOGEN UR STRIP-MCNC: 0.2 E.U./DL (ref 0.2–1)

## 2018-08-28 ENCOUNTER — HOSPITAL ENCOUNTER (EMERGENCY)
Dept: HOSPITAL 87 - ER | Age: 33
Discharge: HOME | End: 2018-08-28
Payer: MEDICARE

## 2018-08-28 VITALS — SYSTOLIC BLOOD PRESSURE: 117 MMHG | DIASTOLIC BLOOD PRESSURE: 84 MMHG

## 2018-08-28 VITALS — WEIGHT: 115.74 LBS | HEIGHT: 65 IN | BODY MASS INDEX: 19.28 KG/M2

## 2018-08-28 DIAGNOSIS — Z90.49: ICD-10-CM

## 2018-08-28 DIAGNOSIS — Z88.2: ICD-10-CM

## 2018-08-28 DIAGNOSIS — K85.90: Primary | ICD-10-CM

## 2018-08-28 DIAGNOSIS — G43.909: ICD-10-CM

## 2018-08-28 DIAGNOSIS — Z91.040: ICD-10-CM

## 2018-08-28 DIAGNOSIS — Z98.890: ICD-10-CM

## 2018-08-28 DIAGNOSIS — Z88.8: ICD-10-CM

## 2018-08-28 DIAGNOSIS — Z88.6: ICD-10-CM

## 2018-08-28 LAB
APPEARANCE UR: CLEAR
BASOPHILS NFR BLD AUTO: 0.5 % (ref 0–2)
CHLORIDE SERPL-SCNC: 110 MEQ/L (ref 98–107)
COLOR UR: YELLOW
EOSINOPHIL NFR BLD AUTO: 1.8 % (ref 0–5)
ERYTHROCYTE [DISTWIDTH] IN BLOOD BY AUTOMATED COUNT: 13.2 % (ref 11.6–14.6)
HCT VFR BLD AUTO: 35.2 % (ref 36–48)
HGB BLD-MCNC: 12.2 G/DL (ref 12–16)
HGB UR QL STRIP: NEGATIVE
KETONES UR STRIP-MCNC: NEGATIVE MG/DL
LEUKOCYTE ESTERASE UR QL STRIP: NEGATIVE
LYMPHOCYTES NFR BLD AUTO: 42.1 % (ref 20–50)
MCH RBC QN AUTO: 31.4 PG (ref 28–32)
MCV RBC AUTO: 91 FL (ref 81–99)
MONOCYTES NFR BLD AUTO: 8.8 % (ref 2–8)
NEUTROPHILS NFR BLD AUTO: 46.8 % (ref 40–76)
NITRITE UR QL STRIP: NEGATIVE
PH UR STRIP: 6.5 [PH] (ref 4.5–8)
PLATELET # BLD AUTO: 161 X1000/UL (ref 130–400)
PROT UR QL STRIP: NEGATIVE
RBC # BLD AUTO: 3.87 MILL/UL (ref 4.2–5.4)
SP GR UR STRIP: 1.03 (ref 1–1.03)
UROBILINOGEN UR STRIP-MCNC: 0.2 E.U./DL (ref 0.2–1)

## 2018-08-28 PROCEDURE — 81003 URINALYSIS AUTO W/O SCOPE: CPT

## 2018-08-28 PROCEDURE — 96376 TX/PRO/DX INJ SAME DRUG ADON: CPT

## 2018-08-28 PROCEDURE — 74176 CT ABD & PELVIS W/O CONTRAST: CPT

## 2018-08-28 PROCEDURE — 76705 ECHO EXAM OF ABDOMEN: CPT

## 2018-08-28 PROCEDURE — 80305 DRUG TEST PRSMV DIR OPT OBS: CPT

## 2018-08-28 PROCEDURE — 80053 COMPREHEN METABOLIC PANEL: CPT

## 2018-08-28 PROCEDURE — 83690 ASSAY OF LIPASE: CPT

## 2018-08-28 PROCEDURE — 96375 TX/PRO/DX INJ NEW DRUG ADDON: CPT

## 2018-08-28 PROCEDURE — 36415 COLL VENOUS BLD VENIPUNCTURE: CPT

## 2018-08-28 PROCEDURE — 99285 EMERGENCY DEPT VISIT HI MDM: CPT

## 2018-08-28 PROCEDURE — 96361 HYDRATE IV INFUSION ADD-ON: CPT

## 2018-08-28 PROCEDURE — 81025 URINE PREGNANCY TEST: CPT

## 2018-08-28 PROCEDURE — 85025 COMPLETE CBC W/AUTO DIFF WBC: CPT

## 2018-08-28 PROCEDURE — 96374 THER/PROPH/DIAG INJ IV PUSH: CPT

## 2018-08-29 ENCOUNTER — HOSPITAL ENCOUNTER (EMERGENCY)
Dept: HOSPITAL 87 - ER | Age: 33
Discharge: HOME | End: 2018-08-29
Payer: MEDICARE

## 2018-08-29 VITALS — WEIGHT: 116.84 LBS | HEIGHT: 65 IN | BODY MASS INDEX: 19.47 KG/M2

## 2018-08-29 VITALS — DIASTOLIC BLOOD PRESSURE: 63 MMHG | SYSTOLIC BLOOD PRESSURE: 111 MMHG

## 2018-08-29 DIAGNOSIS — Z88.2: ICD-10-CM

## 2018-08-29 DIAGNOSIS — Z91.040: ICD-10-CM

## 2018-08-29 DIAGNOSIS — Z88.6: ICD-10-CM

## 2018-08-29 DIAGNOSIS — G43.909: ICD-10-CM

## 2018-08-29 DIAGNOSIS — Z90.49: ICD-10-CM

## 2018-08-29 DIAGNOSIS — Z88.8: ICD-10-CM

## 2018-08-29 DIAGNOSIS — Z87.19: ICD-10-CM

## 2018-08-29 DIAGNOSIS — R10.84: Primary | ICD-10-CM

## 2018-08-29 DIAGNOSIS — R11.2: ICD-10-CM

## 2018-08-29 LAB
AMPHETAMINES UR QL SCN: NEGATIVE
APPEARANCE UR: CLEAR
BARBITURATES UR QL SCN: NEGATIVE
BASOPHILS NFR BLD AUTO: 0.5 % (ref 0–2)
BENZODIAZ UR QL SCN: NEGATIVE
BZE UR QL SCN: NEGATIVE
CANNABINOIDS UR QL SCN: NEGATIVE
CHLORIDE SERPL-SCNC: 108 MEQ/L (ref 98–107)
COLOR UR: YELLOW
EOSINOPHIL NFR BLD AUTO: 1.3 % (ref 0–5)
ERYTHROCYTE [DISTWIDTH] IN BLOOD BY AUTOMATED COUNT: 12.7 % (ref 11.6–14.6)
HCT VFR BLD AUTO: 32.7 % (ref 36–48)
HGB BLD-MCNC: 11.4 G/DL (ref 12–16)
HGB UR QL STRIP: NEGATIVE
INR PPP: 1.1
KETONES UR STRIP-MCNC: NEGATIVE MG/DL
LEUKOCYTE ESTERASE UR QL STRIP: NEGATIVE
LYMPHOCYTES NFR BLD AUTO: 31.6 % (ref 20–50)
MCH RBC QN AUTO: 31.3 PG (ref 28–32)
MCV RBC AUTO: 89.8 FL (ref 81–99)
METHADONE UR QL SCN: NEGATIVE
MONOCYTES NFR BLD AUTO: 7.9 % (ref 2–8)
NEUTROPHILS NFR BLD AUTO: 58.7 % (ref 40–76)
NITRITE UR QL STRIP: NEGATIVE
OPIATES UR QL SCN: NEGATIVE
PCP UR QL SCN: NEGATIVE
PH UR STRIP: 5.5 [PH] (ref 4.5–8)
PLATELET # BLD AUTO: 171 X1000/UL (ref 130–400)
PMV BLD AUTO: 6.7 FL (ref 7.4–10.4)
PROT UR QL STRIP: NEGATIVE
PROTHROMBIN TIME: 11 SEC (ref 9.1–11.1)
RBC # BLD AUTO: 3.64 MILL/UL (ref 4.2–5.4)
SP GR UR STRIP: 1.01 (ref 1–1.03)
UROBILINOGEN UR STRIP-MCNC: 0.2 E.U./DL (ref 0.2–1)

## 2018-08-29 PROCEDURE — 96374 THER/PROPH/DIAG INJ IV PUSH: CPT

## 2018-08-29 PROCEDURE — 96375 TX/PRO/DX INJ NEW DRUG ADDON: CPT

## 2018-08-29 PROCEDURE — 36415 COLL VENOUS BLD VENIPUNCTURE: CPT

## 2018-08-29 PROCEDURE — 85025 COMPLETE CBC W/AUTO DIFF WBC: CPT

## 2018-08-29 PROCEDURE — 85610 PROTHROMBIN TIME: CPT

## 2018-08-29 PROCEDURE — 81003 URINALYSIS AUTO W/O SCOPE: CPT

## 2018-08-29 PROCEDURE — 81025 URINE PREGNANCY TEST: CPT

## 2018-08-29 PROCEDURE — 99285 EMERGENCY DEPT VISIT HI MDM: CPT

## 2018-08-29 PROCEDURE — 80053 COMPREHEN METABOLIC PANEL: CPT

## 2018-08-29 PROCEDURE — 83690 ASSAY OF LIPASE: CPT

## 2018-08-29 PROCEDURE — 96361 HYDRATE IV INFUSION ADD-ON: CPT

## 2018-08-29 PROCEDURE — 96376 TX/PRO/DX INJ SAME DRUG ADON: CPT

## 2018-09-03 ENCOUNTER — HOSPITAL ENCOUNTER (EMERGENCY)
Dept: HOSPITAL 87 - ER | Age: 33
Discharge: HOME | End: 2018-09-03
Payer: MEDICARE

## 2018-09-03 VITALS — WEIGHT: 114.64 LBS | BODY MASS INDEX: 19.1 KG/M2 | HEIGHT: 65 IN

## 2018-09-03 VITALS — SYSTOLIC BLOOD PRESSURE: 101 MMHG | DIASTOLIC BLOOD PRESSURE: 78 MMHG

## 2018-09-03 DIAGNOSIS — R10.13: ICD-10-CM

## 2018-09-03 DIAGNOSIS — Z91.040: ICD-10-CM

## 2018-09-03 DIAGNOSIS — K90.9: ICD-10-CM

## 2018-09-03 DIAGNOSIS — G43.909: ICD-10-CM

## 2018-09-03 DIAGNOSIS — G89.29: Primary | ICD-10-CM

## 2018-09-03 DIAGNOSIS — Z88.6: ICD-10-CM

## 2018-09-03 DIAGNOSIS — Z88.2: ICD-10-CM

## 2018-09-03 DIAGNOSIS — H10.023: ICD-10-CM

## 2018-09-03 DIAGNOSIS — D72.819: ICD-10-CM

## 2018-09-03 DIAGNOSIS — R03.0: ICD-10-CM

## 2018-09-03 DIAGNOSIS — Z90.49: ICD-10-CM

## 2018-09-03 LAB
APPEARANCE UR: CLEAR
BASOPHILS NFR BLD AUTO: 0.6 % (ref 0–2)
CHLORIDE SERPL-SCNC: 108 MEQ/L (ref 98–107)
COLOR UR: YELLOW
EOSINOPHIL NFR BLD AUTO: 1.9 % (ref 0–5)
ERYTHROCYTE [DISTWIDTH] IN BLOOD BY AUTOMATED COUNT: 13.5 % (ref 11.6–14.6)
HCG SERPL QL: NEGATIVE
HCT VFR BLD AUTO: 31.2 % (ref 36–48)
HGB BLD-MCNC: 10.8 G/DL (ref 12–16)
HGB UR QL STRIP: NEGATIVE
KETONES UR STRIP-MCNC: NEGATIVE MG/DL
LEUKOCYTE ESTERASE UR QL STRIP: NEGATIVE
LYMPHOCYTES NFR BLD AUTO: 36.8 % (ref 20–50)
MCH RBC QN AUTO: 31.1 PG (ref 28–32)
MCV RBC AUTO: 89.9 FL (ref 81–99)
MONOCYTES NFR BLD AUTO: 9.7 % (ref 2–8)
NEUTROPHILS NFR BLD AUTO: 51 % (ref 40–76)
NITRITE UR QL STRIP: NEGATIVE
PH UR STRIP: 7 [PH] (ref 4.5–8)
PLATELET # BLD AUTO: 152 X1000/UL (ref 130–400)
PMV BLD AUTO: 6.6 FL (ref 7.4–10.4)
PROT UR QL STRIP: NEGATIVE
RBC # BLD AUTO: 3.47 MILL/UL (ref 4.2–5.4)
SP GR UR STRIP: 1.01 (ref 1–1.03)
UROBILINOGEN UR STRIP-MCNC: 0.2 E.U./DL (ref 0.2–1)

## 2018-09-03 PROCEDURE — 87070 CULTURE OTHR SPECIMN AEROBIC: CPT

## 2018-09-03 PROCEDURE — 83690 ASSAY OF LIPASE: CPT

## 2018-09-03 PROCEDURE — 96375 TX/PRO/DX INJ NEW DRUG ADDON: CPT

## 2018-09-03 PROCEDURE — 99284 EMERGENCY DEPT VISIT MOD MDM: CPT

## 2018-09-03 PROCEDURE — 96374 THER/PROPH/DIAG INJ IV PUSH: CPT

## 2018-09-03 PROCEDURE — 84703 CHORIONIC GONADOTROPIN ASSAY: CPT

## 2018-09-03 PROCEDURE — 81003 URINALYSIS AUTO W/O SCOPE: CPT

## 2018-09-03 PROCEDURE — 36415 COLL VENOUS BLD VENIPUNCTURE: CPT

## 2018-09-03 PROCEDURE — 81025 URINE PREGNANCY TEST: CPT

## 2018-09-03 PROCEDURE — 87430 STREP A AG IA: CPT

## 2018-09-03 PROCEDURE — 80053 COMPREHEN METABOLIC PANEL: CPT

## 2018-09-03 PROCEDURE — 96361 HYDRATE IV INFUSION ADD-ON: CPT

## 2018-09-03 PROCEDURE — 85025 COMPLETE CBC W/AUTO DIFF WBC: CPT

## 2018-09-03 PROCEDURE — 96376 TX/PRO/DX INJ SAME DRUG ADON: CPT

## 2018-09-04 ENCOUNTER — HOSPITAL ENCOUNTER (EMERGENCY)
Dept: HOSPITAL 87 - ER | Age: 33
Discharge: HOME | End: 2018-09-04
Payer: MEDICARE

## 2018-09-04 VITALS — HEIGHT: 67 IN | WEIGHT: 114.64 LBS | BODY MASS INDEX: 17.99 KG/M2

## 2018-09-04 VITALS — DIASTOLIC BLOOD PRESSURE: 60 MMHG | SYSTOLIC BLOOD PRESSURE: 99 MMHG

## 2018-09-04 DIAGNOSIS — Z88.2: ICD-10-CM

## 2018-09-04 DIAGNOSIS — R11.2: ICD-10-CM

## 2018-09-04 DIAGNOSIS — Z91.040: ICD-10-CM

## 2018-09-04 DIAGNOSIS — Z90.49: ICD-10-CM

## 2018-09-04 DIAGNOSIS — G43.909: ICD-10-CM

## 2018-09-04 DIAGNOSIS — R10.13: Primary | ICD-10-CM

## 2018-09-04 DIAGNOSIS — Z88.6: ICD-10-CM

## 2018-09-04 DIAGNOSIS — Z88.8: ICD-10-CM

## 2018-09-04 DIAGNOSIS — D64.9: ICD-10-CM

## 2018-09-04 DIAGNOSIS — R51: ICD-10-CM

## 2018-09-04 DIAGNOSIS — D72.819: ICD-10-CM

## 2018-09-04 LAB
APPEARANCE UR: CLEAR
BASOPHILS NFR BLD AUTO: 0.3 % (ref 0–2)
CHLORIDE SERPL-SCNC: 107 MEQ/L (ref 98–107)
COLOR UR: YELLOW
EOSINOPHIL NFR BLD AUTO: 0.2 % (ref 0–5)
ERYTHROCYTE [DISTWIDTH] IN BLOOD BY AUTOMATED COUNT: 13.2 % (ref 11.6–14.6)
ETHANOL SERPL-MCNC: < 10 MG/DL
HAV IGM SER QL: NEGATIVE
HBV CORE IGM SER QL: NEGATIVE
HBV SURFACE AB SER-ACNC: NEGATIVE M[IU]/ML
HCG SERPL QL: NEGATIVE
HCT VFR BLD AUTO: 31.2 % (ref 36–48)
HGB BLD-MCNC: 11 G/DL (ref 12–16)
HGB UR QL STRIP: NEGATIVE
INR PPP: 1.1
KETONES UR STRIP-MCNC: (no result) MG/DL
LEUKOCYTE ESTERASE UR QL STRIP: NEGATIVE
LYMPHOCYTES NFR BLD AUTO: 15.2 % (ref 20–50)
MCH RBC QN AUTO: 31.6 PG (ref 28–32)
MCV RBC AUTO: 90 FL (ref 81–99)
MONOCYTES NFR BLD AUTO: 4 % (ref 2–8)
NEUTROPHILS NFR BLD AUTO: 80.3 % (ref 40–76)
NITRITE UR QL STRIP: NEGATIVE
PH UR STRIP: 6.5 [PH] (ref 4.5–8)
PLATELET # BLD AUTO: 157 X1000/UL (ref 130–400)
PMV BLD AUTO: 7.2 FL (ref 7.4–10.4)
PROT UR QL STRIP: NEGATIVE
PROTHROMBIN TIME: 10.7 SEC (ref 9.1–11.1)
RBC # BLD AUTO: 3.47 MILL/UL (ref 4.2–5.4)
SP GR UR STRIP: 1.01 (ref 1–1.03)
UROBILINOGEN UR STRIP-MCNC: 0.2 E.U./DL (ref 0.2–1)

## 2018-09-04 PROCEDURE — 86703 HIV-1/HIV-2 1 RESULT ANTBDY: CPT

## 2018-09-04 PROCEDURE — 86705 HEP B CORE ANTIBODY IGM: CPT

## 2018-09-04 PROCEDURE — 96361 HYDRATE IV INFUSION ADD-ON: CPT

## 2018-09-04 PROCEDURE — 81003 URINALYSIS AUTO W/O SCOPE: CPT

## 2018-09-04 PROCEDURE — 80053 COMPREHEN METABOLIC PANEL: CPT

## 2018-09-04 PROCEDURE — 36415 COLL VENOUS BLD VENIPUNCTURE: CPT

## 2018-09-04 PROCEDURE — 84703 CHORIONIC GONADOTROPIN ASSAY: CPT

## 2018-09-04 PROCEDURE — 87340 HEPATITIS B SURFACE AG IA: CPT

## 2018-09-04 PROCEDURE — 96375 TX/PRO/DX INJ NEW DRUG ADDON: CPT

## 2018-09-04 PROCEDURE — 83690 ASSAY OF LIPASE: CPT

## 2018-09-04 PROCEDURE — 85025 COMPLETE CBC W/AUTO DIFF WBC: CPT

## 2018-09-04 PROCEDURE — 99285 EMERGENCY DEPT VISIT HI MDM: CPT

## 2018-09-04 PROCEDURE — 85610 PROTHROMBIN TIME: CPT

## 2018-09-04 PROCEDURE — 96374 THER/PROPH/DIAG INJ IV PUSH: CPT

## 2018-09-04 PROCEDURE — 86803 HEPATITIS C AB TEST: CPT

## 2018-09-04 PROCEDURE — 86709 HEPATITIS A IGM ANTIBODY: CPT

## 2018-09-10 ENCOUNTER — HOSPITAL ENCOUNTER (EMERGENCY)
Dept: HOSPITAL 87 - ER | Age: 33
Discharge: HOME | End: 2018-09-10
Payer: MEDICARE

## 2018-09-10 VITALS — BODY MASS INDEX: 21.48 KG/M2 | WEIGHT: 121.25 LBS | HEIGHT: 63 IN

## 2018-09-10 VITALS — DIASTOLIC BLOOD PRESSURE: 85 MMHG | SYSTOLIC BLOOD PRESSURE: 130 MMHG

## 2018-09-10 DIAGNOSIS — Z88.2: ICD-10-CM

## 2018-09-10 DIAGNOSIS — R10.30: Primary | ICD-10-CM

## 2018-09-10 DIAGNOSIS — Z88.6: ICD-10-CM

## 2018-09-10 DIAGNOSIS — Z76.5: ICD-10-CM

## 2018-09-10 DIAGNOSIS — F41.9: ICD-10-CM

## 2018-09-10 DIAGNOSIS — Z91.040: ICD-10-CM

## 2018-09-10 DIAGNOSIS — Z88.8: ICD-10-CM

## 2018-09-10 DIAGNOSIS — G43.909: ICD-10-CM

## 2018-09-10 DIAGNOSIS — Z90.49: ICD-10-CM

## 2018-09-10 LAB
BASOPHILS NFR BLD AUTO: 0.8 % (ref 0–2)
CHLORIDE SERPL-SCNC: 106 MEQ/L (ref 98–107)
EOSINOPHIL NFR BLD AUTO: 1.4 % (ref 0–5)
ERYTHROCYTE [DISTWIDTH] IN BLOOD BY AUTOMATED COUNT: 13.5 % (ref 11.6–14.6)
HCT VFR BLD AUTO: 34.9 % (ref 36–48)
HGB BLD-MCNC: 11.7 G/DL (ref 12–16)
INR PPP: 1.1
LYMPHOCYTES NFR BLD AUTO: 31.5 % (ref 20–50)
MCH RBC QN AUTO: 30.9 PG (ref 28–32)
MCV RBC AUTO: 92.3 FL (ref 81–99)
MONOCYTES NFR BLD AUTO: 4.9 % (ref 2–8)
NEUTROPHILS NFR BLD AUTO: 61.4 % (ref 40–76)
PLATELET # BLD AUTO: 214 X1000/UL (ref 130–400)
PLATELET # BLD EST: NORMAL 10*3/UL
PMV BLD AUTO: 7.5 FL (ref 7.4–10.4)
PROTHROMBIN TIME: 10.7 SEC (ref 9.1–11.1)
RBC # BLD AUTO: 3.78 MILL/UL (ref 4.2–5.4)

## 2018-09-10 PROCEDURE — 80053 COMPREHEN METABOLIC PANEL: CPT

## 2018-09-10 PROCEDURE — 83690 ASSAY OF LIPASE: CPT

## 2018-09-10 PROCEDURE — 36415 COLL VENOUS BLD VENIPUNCTURE: CPT

## 2018-09-10 PROCEDURE — 99284 EMERGENCY DEPT VISIT MOD MDM: CPT

## 2018-09-10 PROCEDURE — 85025 COMPLETE CBC W/AUTO DIFF WBC: CPT

## 2018-09-10 PROCEDURE — 96372 THER/PROPH/DIAG INJ SC/IM: CPT

## 2018-09-10 PROCEDURE — 85610 PROTHROMBIN TIME: CPT

## 2019-01-15 ENCOUNTER — HOSPITAL ENCOUNTER (EMERGENCY)
Dept: HOSPITAL 4 - SED | Age: 34
Discharge: HOME | End: 2019-01-15
Payer: COMMERCIAL

## 2019-01-15 VITALS — HEIGHT: 65 IN | WEIGHT: 110 LBS | BODY MASS INDEX: 18.33 KG/M2

## 2019-01-15 VITALS — SYSTOLIC BLOOD PRESSURE: 104 MMHG

## 2019-01-15 VITALS — SYSTOLIC BLOOD PRESSURE: 112 MMHG

## 2019-01-15 DIAGNOSIS — Z86.2: ICD-10-CM

## 2019-01-15 DIAGNOSIS — K85.90: Primary | ICD-10-CM

## 2019-01-15 DIAGNOSIS — Z88.6: ICD-10-CM

## 2019-01-15 DIAGNOSIS — Z88.2: ICD-10-CM

## 2019-01-15 DIAGNOSIS — K21.9: ICD-10-CM

## 2019-01-15 DIAGNOSIS — Z90.49: ICD-10-CM

## 2019-01-15 DIAGNOSIS — Z88.1: ICD-10-CM

## 2019-01-15 DIAGNOSIS — Z91.040: ICD-10-CM

## 2019-01-15 DIAGNOSIS — Z88.8: ICD-10-CM

## 2019-01-15 DIAGNOSIS — G43.709: ICD-10-CM

## 2019-01-15 DIAGNOSIS — Z88.0: ICD-10-CM

## 2019-01-15 DIAGNOSIS — Z88.5: ICD-10-CM

## 2019-01-15 LAB
ALBUMIN SERPL BCP-MCNC: 3.7 G/DL (ref 3.4–4.8)
ALT SERPL W P-5'-P-CCNC: 14 U/L (ref 12–78)
ANION GAP SERPL CALCULATED.3IONS-SCNC: 5 MMOL/L (ref 5–15)
AST SERPL W P-5'-P-CCNC: 14 U/L (ref 10–37)
BASOPHILS # BLD AUTO: 0.1 K/UL (ref 0–0.2)
BASOPHILS NFR BLD AUTO: 1.5 % (ref 0–2)
BILIRUB SERPL-MCNC: 0.2 MG/DL (ref 0–1)
BUN SERPL-MCNC: 14 MG/DL (ref 8–21)
CALCIUM SERPL-MCNC: 8.4 MG/DL (ref 8.4–11)
CHLORIDE SERPL-SCNC: 107 MMOL/L (ref 98–107)
CREAT SERPL-MCNC: 0.69 MG/DL (ref 0.55–1.3)
EOSINOPHIL # BLD AUTO: 0.1 K/UL (ref 0–0.4)
EOSINOPHIL NFR BLD AUTO: 2.4 % (ref 0–4)
ERYTHROCYTE [DISTWIDTH] IN BLOOD BY AUTOMATED COUNT: 13.4 % (ref 9–15)
GFR SERPL CREATININE-BSD FRML MDRD: 125 ML/MIN (ref 90–?)
GLUCOSE SERPL-MCNC: 87 MG/DL (ref 70–99)
HCT VFR BLD AUTO: 32.4 % (ref 36–48)
HGB BLD-MCNC: 10.8 G/DL (ref 12–16)
LIPASE SERPL-CCNC: 295 U/L (ref 73–393)
LYMPHOCYTES # BLD AUTO: 1.5 K/UL (ref 1–5.5)
LYMPHOCYTES NFR BLD AUTO: 39.8 % (ref 20.5–51.5)
MCH RBC QN AUTO: 31 PG (ref 27–31)
MCHC RBC AUTO-ENTMCNC: 33 % (ref 32–36)
MCV RBC AUTO: 93 FL (ref 79–98)
MONOCYTES # BLD MANUAL: 0.2 K/UL (ref 0–1)
MONOCYTES # BLD MANUAL: 6.1 % (ref 1.7–9.3)
NEUTROPHILS # BLD AUTO: 2 K/UL (ref 1.8–7.7)
NEUTROPHILS NFR BLD AUTO: 50.2 % (ref 40–70)
PLATELET # BLD AUTO: 220 K/UL (ref 130–430)
POTASSIUM SERPL-SCNC: 4 MMOL/L (ref 3.5–5.1)
RBC # BLD AUTO: 3.48 MIL/UL (ref 4.2–6.2)
SODIUM SERPLBLD-SCNC: 137 MMOL/L (ref 136–145)
WBC # BLD AUTO: 3.9 K/UL (ref 4.8–10.8)

## 2019-01-15 PROCEDURE — 96361 HYDRATE IV INFUSION ADD-ON: CPT

## 2019-01-15 PROCEDURE — 36415 COLL VENOUS BLD VENIPUNCTURE: CPT

## 2019-01-15 PROCEDURE — 99283 EMERGENCY DEPT VISIT LOW MDM: CPT

## 2019-01-15 PROCEDURE — 96375 TX/PRO/DX INJ NEW DRUG ADDON: CPT

## 2019-01-15 PROCEDURE — 83690 ASSAY OF LIPASE: CPT

## 2019-01-15 PROCEDURE — 96374 THER/PROPH/DIAG INJ IV PUSH: CPT

## 2019-01-15 PROCEDURE — 85025 COMPLETE CBC W/AUTO DIFF WBC: CPT

## 2019-01-15 PROCEDURE — 80053 COMPREHEN METABOLIC PANEL: CPT

## 2019-01-30 ENCOUNTER — HOSPITAL ENCOUNTER (EMERGENCY)
Dept: HOSPITAL 4 - SED | Age: 34
Discharge: HOME | End: 2019-01-30
Payer: COMMERCIAL

## 2019-01-30 VITALS — SYSTOLIC BLOOD PRESSURE: 119 MMHG

## 2019-01-30 VITALS — WEIGHT: 112 LBS | HEIGHT: 65 IN | BODY MASS INDEX: 18.66 KG/M2

## 2019-01-30 VITALS — SYSTOLIC BLOOD PRESSURE: 123 MMHG

## 2019-01-30 DIAGNOSIS — Z91.040: ICD-10-CM

## 2019-01-30 DIAGNOSIS — Z86.2: ICD-10-CM

## 2019-01-30 DIAGNOSIS — K86.1: Primary | ICD-10-CM

## 2019-01-30 DIAGNOSIS — Z88.2: ICD-10-CM

## 2019-01-30 DIAGNOSIS — K21.9: ICD-10-CM

## 2019-01-30 DIAGNOSIS — Z88.8: ICD-10-CM

## 2019-01-30 DIAGNOSIS — Z88.1: ICD-10-CM

## 2019-01-30 DIAGNOSIS — Z88.0: ICD-10-CM

## 2019-01-30 DIAGNOSIS — Z88.6: ICD-10-CM

## 2019-01-30 DIAGNOSIS — Z90.49: ICD-10-CM

## 2019-01-30 DIAGNOSIS — R11.2: ICD-10-CM

## 2019-01-30 DIAGNOSIS — Z88.5: ICD-10-CM

## 2019-01-30 LAB
ALBUMIN SERPL BCP-MCNC: 3.8 G/DL (ref 3.4–4.8)
ALT SERPL W P-5'-P-CCNC: 19 U/L (ref 12–78)
ANION GAP SERPL CALCULATED.3IONS-SCNC: 9 MMOL/L (ref 5–15)
AST SERPL W P-5'-P-CCNC: 17 U/L (ref 10–37)
BASOPHILS # BLD AUTO: 0 K/UL (ref 0–0.2)
BASOPHILS NFR BLD AUTO: 0.5 % (ref 0–2)
BILIRUB SERPL-MCNC: 0.4 MG/DL (ref 0–1)
BUN SERPL-MCNC: 8 MG/DL (ref 8–21)
CALCIUM SERPL-MCNC: 8.7 MG/DL (ref 8.4–11)
CHLORIDE SERPL-SCNC: 107 MMOL/L (ref 98–107)
CREAT SERPL-MCNC: 0.53 MG/DL (ref 0.55–1.3)
EOSINOPHIL # BLD AUTO: 0.1 K/UL (ref 0–0.4)
EOSINOPHIL NFR BLD AUTO: 2.9 % (ref 0–4)
ERYTHROCYTE [DISTWIDTH] IN BLOOD BY AUTOMATED COUNT: 12.9 % (ref 9–15)
GFR SERPL CREATININE-BSD FRML MDRD: 170 ML/MIN (ref 90–?)
GLUCOSE SERPL-MCNC: 105 MG/DL (ref 70–99)
HCT VFR BLD AUTO: 34.3 % (ref 36–48)
HGB BLD-MCNC: 11.4 G/DL (ref 12–16)
LIPASE SERPL-CCNC: 407 U/L (ref 73–393)
LYMPHOCYTES # BLD AUTO: 1.4 K/UL (ref 1–5.5)
LYMPHOCYTES NFR BLD AUTO: 37.7 % (ref 20.5–51.5)
MCH RBC QN AUTO: 31 PG (ref 27–31)
MCHC RBC AUTO-ENTMCNC: 33 % (ref 32–36)
MCV RBC AUTO: 93 FL (ref 79–98)
MONOCYTES # BLD MANUAL: 0.3 K/UL (ref 0–1)
MONOCYTES # BLD MANUAL: 7.5 % (ref 1.7–9.3)
NEUTROPHILS # BLD AUTO: 2 K/UL (ref 1.8–7.7)
NEUTROPHILS NFR BLD AUTO: 51.4 % (ref 40–70)
PLATELET # BLD AUTO: 227 K/UL (ref 130–430)
POTASSIUM SERPL-SCNC: 4.3 MMOL/L (ref 3.5–5.1)
RBC # BLD AUTO: 3.7 MIL/UL (ref 4.2–6.2)
SODIUM SERPLBLD-SCNC: 141 MMOL/L (ref 136–145)
WBC # BLD AUTO: 3.8 K/UL (ref 4.8–10.8)

## 2019-01-30 PROCEDURE — 96375 TX/PRO/DX INJ NEW DRUG ADDON: CPT

## 2019-01-30 PROCEDURE — 83690 ASSAY OF LIPASE: CPT

## 2019-01-30 PROCEDURE — 96376 TX/PRO/DX INJ SAME DRUG ADON: CPT

## 2019-01-30 PROCEDURE — 99283 EMERGENCY DEPT VISIT LOW MDM: CPT

## 2019-01-30 PROCEDURE — 81025 URINE PREGNANCY TEST: CPT

## 2019-01-30 PROCEDURE — 81002 URINALYSIS NONAUTO W/O SCOPE: CPT

## 2019-01-30 PROCEDURE — 96374 THER/PROPH/DIAG INJ IV PUSH: CPT

## 2019-01-30 PROCEDURE — 80053 COMPREHEN METABOLIC PANEL: CPT

## 2019-01-30 PROCEDURE — 85025 COMPLETE CBC W/AUTO DIFF WBC: CPT

## 2019-01-30 PROCEDURE — 36415 COLL VENOUS BLD VENIPUNCTURE: CPT

## 2019-01-30 NOTE — NUR
BLOOD SPECIMENS COLLECTED FROM PT'S PORT-A-CATH. TOLERATED WELL. BLOOD 
SPECIMENS SENT TO LAB. PORT-A-CATH FLUSHED WITH 10ML NS; PATENT. TOLERATED 
WELL.

## 2019-01-30 NOTE — NUR
PATIENT SITTING UP ON BED. NO SOB. NO CHEST PAIN. PATIENT  IN NO ACUTE 
DISTRESS. NO OBJECTIVE S/SX OF PAIN OBSERVED. PT WITH C/O HEADACHE, RIGHT FLANK 
PAIN, AND EPIGASTRIC DISCOMFORT x4 DAYS. +NAUSEA, +VOMITING. NO DIARRHEA. NO 
FEVERS. PT ALSO REQUESTING A NEEDLE CHANGE FOR HER PORT-A-CATH. PATIENT RESTING 
COMFORTABLY ON BED. REST, RELAXATION, AND DEEP BREATHING ENCOURAGED. MD AWARE 
OF PT CONDITION AND CHIEF COMPLAINT. WILL CONTINUE TO MONITOR.

## 2019-01-30 NOTE — NUR
Patient given written and verbal discharge instructions and verbalizes 
understanding.  ER MD discussed with patient the results and treatment 
provided. Patient in stable condition. ID arm band removed.

Rx OF PECID AND NORCO given, BUT PT REFUSED PRESCRIPTIONS. PT STATES, "I DON'T 
NEED THEM." DR. JANE MADE AWARE. Patient educated on pain management and to 
follow up with PMD. Pain Scale 7/10; TOLERABLE AS VERBALIZED. NO OBJECTIVE S/SX 
OF PAIN OBSERVED.

Opportunity for questions provided and answered. Medication side effect fact 
sheet provided. 



PATIENT IN NO ACUTE DISTRESS AND IN GOOD CONDITION. PT NOTED WITH A STABLE 
GAIT.

## 2019-01-30 NOTE — NUR
PATIENT VERBALIZED RELIEF FROM NAUSEA AND EPIGASTRIC DISCOMFORT. NO EPISODE OF 
VOMITING. WILL CONTINUE TO MONITOR.

## 2019-01-30 NOTE — NUR
IV FLUIDS OF NS STARTED PER MD ORDERS. TOLERATED WELL.



MORPHINE ADMINISTERED FOR PAIN. TOLERATED WELL. WILL CONTINUE TO MONITOR. 



SIDERAILS UP x2. PATIENT PLACED ON O2 MONITOR.

## 2019-01-30 NOTE — NUR
PER PT REQUEST, RIGHT UPPER CHEST PORT-A-CATH NEEDLE CHANGED TO A 20G x 1.0 
INCH MINILOC NEEDLE VIA STERILE TECHNIQUE. TOLERATED WELL. PORT-A-CATH NOTED 
WITH GOOD BLOOD RETURN. FLUSHED WITH 20ML OF NS. TOLERATED WELL.

## 2019-02-06 ENCOUNTER — HOSPITAL ENCOUNTER (EMERGENCY)
Dept: HOSPITAL 72 - EMR | Age: 34
LOS: 1 days | Discharge: HOME | End: 2019-02-07
Payer: MEDICARE

## 2019-02-06 VITALS — WEIGHT: 110 LBS | HEIGHT: 65 IN | BODY MASS INDEX: 18.33 KG/M2

## 2019-02-06 VITALS — DIASTOLIC BLOOD PRESSURE: 81 MMHG | SYSTOLIC BLOOD PRESSURE: 112 MMHG

## 2019-02-06 DIAGNOSIS — Z91.040: ICD-10-CM

## 2019-02-06 DIAGNOSIS — Z88.6: ICD-10-CM

## 2019-02-06 DIAGNOSIS — Z88.8: ICD-10-CM

## 2019-02-06 DIAGNOSIS — Z88.0: ICD-10-CM

## 2019-02-06 DIAGNOSIS — K29.70: Primary | ICD-10-CM

## 2019-02-06 DIAGNOSIS — F11.20: ICD-10-CM

## 2019-02-06 DIAGNOSIS — Z88.2: ICD-10-CM

## 2019-02-06 DIAGNOSIS — E11.9: ICD-10-CM

## 2019-02-06 PROCEDURE — 96375 TX/PRO/DX INJ NEW DRUG ADDON: CPT

## 2019-02-06 PROCEDURE — 83690 ASSAY OF LIPASE: CPT

## 2019-02-06 PROCEDURE — 36415 COLL VENOUS BLD VENIPUNCTURE: CPT

## 2019-02-06 PROCEDURE — 99284 EMERGENCY DEPT VISIT MOD MDM: CPT

## 2019-02-06 PROCEDURE — 80053 COMPREHEN METABOLIC PANEL: CPT

## 2019-02-06 PROCEDURE — 96374 THER/PROPH/DIAG INJ IV PUSH: CPT

## 2019-02-06 PROCEDURE — 96361 HYDRATE IV INFUSION ADD-ON: CPT

## 2019-02-06 PROCEDURE — 85025 COMPLETE CBC W/AUTO DIFF WBC: CPT

## 2019-02-06 NOTE — NUR
ED Nurse Note:



PT CAME TO ED C/O PANCREATITIS AND PEPTIC ULCER PAIN PT SAID SHE HAS BEEN 
VOMTING BLOOD SINCE THIS MORNING , PAIN 10/10 EPIGASTRIC SHARP SHOOTING PAIN 
AND BURNGIN STOMACH PAIN. Port cath located on UR chest.

## 2019-02-07 VITALS — DIASTOLIC BLOOD PRESSURE: 75 MMHG | SYSTOLIC BLOOD PRESSURE: 120 MMHG

## 2019-02-07 LAB
ADD MANUAL DIFF: NO
ALBUMIN SERPL-MCNC: 4 G/DL (ref 3.4–5)
ALBUMIN/GLOB SERPL: 1.6 {RATIO} (ref 1–2.7)
ALP SERPL-CCNC: 71 U/L (ref 46–116)
ALT SERPL-CCNC: 14 U/L (ref 12–78)
ANION GAP SERPL CALC-SCNC: 7 MMOL/L (ref 5–15)
AST SERPL-CCNC: 17 U/L (ref 15–37)
BASOPHILS NFR BLD AUTO: 1.5 % (ref 0–2)
BILIRUB SERPL-MCNC: 0.2 MG/DL (ref 0.2–1)
BUN SERPL-MCNC: 14 MG/DL (ref 7–18)
CALCIUM SERPL-MCNC: 8.2 MG/DL (ref 8.5–10.1)
CHLORIDE SERPL-SCNC: 106 MMOL/L (ref 98–107)
CO2 SERPL-SCNC: 26 MMOL/L (ref 21–32)
CREAT SERPL-MCNC: 0.7 MG/DL (ref 0.55–1.3)
EOSINOPHIL NFR BLD AUTO: 3.5 % (ref 0–3)
ERYTHROCYTE [DISTWIDTH] IN BLOOD BY AUTOMATED COUNT: 11.6 % (ref 11.6–14.8)
GLOBULIN SER-MCNC: 2.5 G/DL
HCT VFR BLD CALC: 31.2 % (ref 37–47)
HGB BLD-MCNC: 10.6 G/DL (ref 12–16)
LYMPHOCYTES NFR BLD AUTO: 46 % (ref 20–45)
MCV RBC AUTO: 92 FL (ref 80–99)
MONOCYTES NFR BLD AUTO: 6.9 % (ref 1–10)
NEUTROPHILS NFR BLD AUTO: 42.1 % (ref 45–75)
PLATELET # BLD: 199 K/UL (ref 150–450)
POTASSIUM SERPL-SCNC: 3.2 MMOL/L (ref 3.5–5.1)
RBC # BLD AUTO: 3.41 M/UL (ref 4.2–5.4)
SODIUM SERPL-SCNC: 139 MMOL/L (ref 136–145)
WBC # BLD AUTO: 5.4 K/UL (ref 4.8–10.8)

## 2019-02-07 NOTE — NUR
ED Nurse Note:





PT is DC per ERMD order. pt is alert and oriented times 4 and presents with no 
abnormal complicartions. pt has left with all belongings with the ID band 
removed. pt has left with DC notes and prescriptions and has shown 
understandings of DC instructions. pt is instructed to follow up with primary 
provider and/ or return to ER if any reoccurance of symptoms or any 
complications.

## 2019-02-07 NOTE — EMERGENCY ROOM REPORT
History of Present Illness


General


Chief Complaint:  Abdominal Pain


Source:  Patient





Present Illness


HPI


33-year-old female presents ED for evaluation.  Patient complaining of 

abdominal pain with vomiting.  States she has pancreatitis and an ulcer.  

Patient is well-known to [facility]  Has been here multiple times for similar 

presentations of pain and vomiting.  Patient states she has a Port-A-Cath 

placed.  States that her Pepcid is not working at home.  Pain is a 10 out of 10

, sharp, nonradiating.  Denies chest pain or shortness of breath.  Denies 

fevers chills.  No other aggravating relieving factors.  Denies any other 

associated symptoms


Allergies:  


Coded Allergies:  


     ACETAMINOPHEN (Verified  Allergy, Unknown, 3/14/17)


     ASPIRIN (Unverified  Allergy, Unknown, 3/14/17)


     HALOPERIDOL (Verified  Allergy, Unknown, 3/14/17)


     LATEX (Verified  Allergy, Unknown, 3/14/17)


     METOCLOPRAMIDE (Unverified  Allergy, Unknown, 3/14/17)


     PENICILLIN (Unverified  Allergy, Unknown, 3/14/17)


     PROCHLORPERAZINE (Unverified  Allergy, Unknown, 8/5/15)


     SULFA (SULFONAMIDE ANTIBIOTICS) (Unverified  Allergy, Unknown, 8/5/15)


Uncoded Allergies:  


     SULFA (Allergy, Unknown, 4/17/16)





Patient History


Past Medical History:  DM, other - pancreatitis


Past Surgical History:  none


Pertinent Family History:  none


Social History:  Denies: smoking, alcohol use, drug use


Last Menstrual Period:  3 WEEKS AGO


Pregnant Now:  No


Immunizations:  UTD


Reviewed Nursing Documentation:  PMH: Agreed; PSxH: Agreed





Nursing Documentation-PMH


Past Medical History:  No History, Except For


Hx Pacemaker:  No


Hx Asthma:  No


Hx COPD:  No


Hx Diabetes:  Yes - dm2


Hx Cancer:  No


Hx Gastrointestinal Problems:  Yes - Pancreatic insufficiency


Hx Dialysis:  Yes


Hx Cerebrovascular Accident:  No


Hx Seizures:  No


Hx Vertigo:  Yes


Hx Dizziness:  Yes


Hx Headaches:  Yes - migraines





Review of Systems


All Other Systems:  negative except mentioned in HPI





Physical Exam





Vital Signs








  Date Time  Temp Pulse Resp B/P (MAP) Pulse Ox O2 Delivery O2 Flow Rate FiO2


 


2/6/19 23:30 98.1 108 16  99 Room Air  


 


2/6/19 23:38        99


 


2/6/19 23:38    112/81    








Sp02 EP Interpretation:  reviewed, normal


General Appearance:  no apparent distress, alert, GCS 15, non-toxic


Head:  normocephalic, atraumatic


Eyes:  bilateral eye normal inspection, bilateral eye PERRL


ENT:  hearing grossly normal, normal pharynx, no angioedema, normal voice


Neck:  full range of motion, supple/symm/no masses


Respiratory:  chest non-tender, lungs clear, normal breath sounds, speaking 

full sentences


Cardiovascular #1:  regular rate, rhythm, no edema


Cardiovascular #2:  2+ carotid (R), 2+ carotid (L), 2+ radial (R), 2+ radial (L)

, 2+ dorsalis pedis (R), 2+ dorsalis pedis (L)


Gastrointestinal:  normal bowel sounds, non tender, soft, non-distended, no 

guarding, no rebound


Rectal:  deferred


Genitourinary:  normal inspection, no CVA tenderness


Musculoskeletal:  back normal, gait/station normal, normal range of motion, non-

tender


Neurologic:  alert, oriented x3, responsive, motor strength/tone normal, 

sensory intact, speech normal


Psychiatric:  judgement/insight normal, memory normal, mood/affect normal, no 

suicidal/homicidal ideation


Reflexes:  3+ bicep (R), 3+ bicep (L), 3+ tricep (R), 3+ tricep (L), 3+ knee (R)

, 3+ knee (L)


Skin:  normal color, no rash, warm/dry, well hydrated


Lymphatic:  no adenopathy





Medical Decision Making


Diagnostic Impression:  


 Primary Impression:  


 Gastritis


 Qualified Codes:  K29.00 - Acute gastritis without bleeding


 Additional Impression:  


 Opiate dependence


 Qualified Codes:  F11.29 - Opioid dependence with unspecified opioid-induced 

disorder


ER Course


Hospital Course 


33-year-old F presents to ED with epigastric pain with N/V.





differential diagnosis: gastritis, SBO, cholecystits 





Clinical course


Patient placed on stretcher.  On cardiac monitor.  After initial history and 

physical I ordered labs, IV fluids, protonix and zofran





Labs - no leukocytosis, no electrolyte abnormalities, LFTs normal, lipase 

within normal limits 





On multiple occasions patient is requesting IV pain medications.  Mother at 

bedside also requesting for patient received pain medications.  Patient is well-

known to [facility].  Noted to be verbally abusive and has opioid dependence.  I seen 

this patient multiple times myself.  Always her workup has been negative and 

despite that patient continues to request IV pain medications





At this time patient has stable vitals, resting comfortably after Protonix and 

Zofran and IV fluids.  There is no indication for IV pain medications as 

patient insists.





Patient will be discharged at this time.  Patient will follow-up as outpatient 

with her PMD





I feel this is a highly complex case requiring extensive working including EKG/

Rhythm strip, Xray/CT/US, Blood/urine lab work, repeat exams while in ED, and 

administration of strong opiates/narcotics for pain control, admission to 

hospital or close patient follow up.  





Diagnosis - gastritis, opioid dependence  





Stable and discharged to home with prescriptions for protonix, zofran.  

Followup with PMD.  Return to ED if symptoms recur or worsen





Labs








Test


  2/7/19


00:17


 


White Blood Count


  5.4 K/UL


(4.8-10.8)


 


Red Blood Count


  3.41 M/UL


(4.20-5.40)


 


Hemoglobin


  10.6 G/DL


(12.0-16.0)


 


Hematocrit


  31.2 %


(37.0-47.0)


 


Mean Corpuscular Volume 92 FL (80-99) 


 


Mean Corpuscular Hemoglobin


  31.1 PG


(27.0-31.0)


 


Mean Corpuscular Hemoglobin


Concent 33.9 G/DL


(32.0-36.0)


 


Red Cell Distribution Width


  11.6 %


(11.6-14.8)


 


Platelet Count


  199 K/UL


(150-450)


 


Mean Platelet Volume


  5.4 FL


(6.5-10.1)


 


Neutrophils (%) (Auto)


  42.1 %


(45.0-75.0)


 


Lymphocytes (%) (Auto)


  46.0 %


(20.0-45.0)


 


Monocytes (%) (Auto)


  6.9 %


(1.0-10.0)


 


Eosinophils (%) (Auto)


  3.5 %


(0.0-3.0)


 


Basophils (%) (Auto)


  1.5 %


(0.0-2.0)


 


Sodium Level


  139 MMOL/L


(136-145)


 


Potassium Level


  3.2 MMOL/L


(3.5-5.1)


 


Chloride Level


  106 MMOL/L


()


 


Carbon Dioxide Level


  26 MMOL/L


(21-32)


 


Anion Gap


  7 mmol/L


(5-15)


 


Blood Urea Nitrogen


  14 mg/dL


(7-18)


 


Creatinine


  0.7 MG/DL


(0.55-1.30)


 


Estimat Glomerular Filtration


Rate > 60 mL/min


(>60)


 


Glucose Level


  126 MG/DL


()


 


Calcium Level


  8.2 MG/DL


(8.5-10.1)


 


Total Bilirubin


  0.2 MG/DL


(0.2-1.0)


 


Aspartate Amino Transf


(AST/SGOT) 17 U/L (15-37) 


 


 


Alanine Aminotransferase


(ALT/SGPT) 14 U/L (12-78) 


 


 


Alkaline Phosphatase


  71 U/L


()


 


Total Protein


  6.5 G/DL


(6.4-8.2)


 


Albumin


  4.0 G/DL


(3.4-5.0)


 


Globulin 2.5 g/dL 


 


Albumin/Globulin Ratio 1.6 (1.0-2.7) 


 


Lipase


  245 U/L


()











Last Vital Signs








  Date Time  Temp Pulse Resp B/P (MAP) Pulse Ox O2 Delivery O2 Flow Rate FiO2


 


2/7/19 01:38 98.1 65 16 120/75 99 Room Air  99








Status:  improved


Disposition:  HOME, SELF-CARE


Condition:  Stable


Scripts


Ondansetron Odt* (ZOFRAN ODT*) 4 Mg Tab.rapdis


4 MG BC EVERY 8 HOURS, #10 TAB 0 Refills


   Prov: Moi George MD         2/7/19 


Pantoprazole* (PROTONIX*) 40 Mg Tablet.dr


40 MG ORAL DAILY for 30 Days, TAB


   Prov: Moi George MD         2/7/19


Referrals:  


NOT CHOSEN [facility]/MD,REFERRING (PCP)


Patient Instructions:  Gastritis, Adult, Easy-to-Read











Moi George MD Feb 7, 2019 06:06

## 2019-02-23 ENCOUNTER — HOSPITAL ENCOUNTER (EMERGENCY)
Dept: HOSPITAL 4 - SED | Age: 34
Discharge: HOME | End: 2019-02-23
Payer: COMMERCIAL

## 2019-02-23 VITALS — HEIGHT: 65 IN | WEIGHT: 110 LBS | BODY MASS INDEX: 18.33 KG/M2

## 2019-02-23 VITALS — SYSTOLIC BLOOD PRESSURE: 100 MMHG

## 2019-02-23 VITALS — SYSTOLIC BLOOD PRESSURE: 130 MMHG

## 2019-02-23 DIAGNOSIS — Z86.2: ICD-10-CM

## 2019-02-23 DIAGNOSIS — Z91.040: ICD-10-CM

## 2019-02-23 DIAGNOSIS — Z88.1: ICD-10-CM

## 2019-02-23 DIAGNOSIS — Z79.899: ICD-10-CM

## 2019-02-23 DIAGNOSIS — Z88.5: ICD-10-CM

## 2019-02-23 DIAGNOSIS — K21.9: ICD-10-CM

## 2019-02-23 DIAGNOSIS — K85.90: Primary | ICD-10-CM

## 2019-02-23 DIAGNOSIS — Z90.49: ICD-10-CM

## 2019-02-23 DIAGNOSIS — Z88.0: ICD-10-CM

## 2019-02-23 DIAGNOSIS — Z88.6: ICD-10-CM

## 2019-02-23 DIAGNOSIS — Z88.2: ICD-10-CM

## 2019-02-23 DIAGNOSIS — R07.89: ICD-10-CM

## 2019-02-23 LAB
ALBUMIN SERPL BCP-MCNC: 3.9 G/DL (ref 3.4–4.8)
ALT SERPL W P-5'-P-CCNC: 20 U/L (ref 12–78)
ANION GAP SERPL CALCULATED.3IONS-SCNC: 6 MMOL/L (ref 5–15)
APPEARANCE UR: CLEAR
AST SERPL W P-5'-P-CCNC: 16 U/L (ref 10–37)
BACTERIA URNS QL MICRO: (no result) /HPF
BASOPHILS # BLD AUTO: 0 K/UL (ref 0–0.2)
BASOPHILS NFR BLD AUTO: 0.5 % (ref 0–2)
BILIRUB SERPL-MCNC: 0.2 MG/DL (ref 0–1)
BILIRUB UR QL STRIP: NEGATIVE
BUN SERPL-MCNC: 9 MG/DL (ref 8–21)
CALCIUM SERPL-MCNC: 8.5 MG/DL (ref 8.4–11)
CHLORIDE SERPL-SCNC: 106 MMOL/L (ref 98–107)
COLOR UR: YELLOW
CREAT SERPL-MCNC: 0.64 MG/DL (ref 0.55–1.3)
EOSINOPHIL # BLD AUTO: 0.1 K/UL (ref 0–0.4)
EOSINOPHIL NFR BLD AUTO: 1.9 % (ref 0–4)
ERYTHROCYTE [DISTWIDTH] IN BLOOD BY AUTOMATED COUNT: 13.5 % (ref 9–15)
GFR SERPL CREATININE-BSD FRML MDRD: 137 ML/MIN (ref 90–?)
GLUCOSE SERPL-MCNC: 93 MG/DL (ref 70–99)
GLUCOSE UR STRIP-MCNC: NEGATIVE MG/DL
HCT VFR BLD AUTO: 33.6 % (ref 36–48)
HGB BLD-MCNC: 11.2 G/DL (ref 12–16)
HGB UR QL STRIP: (no result)
KETONES UR STRIP-MCNC: NEGATIVE MG/DL
LEUKOCYTE ESTERASE UR QL STRIP: NEGATIVE
LIPASE SERPL-CCNC: 492 U/L (ref 73–393)
LYMPHOCYTES # BLD AUTO: 2 K/UL (ref 1–5.5)
LYMPHOCYTES NFR BLD AUTO: 37.5 % (ref 20.5–51.5)
MCH RBC QN AUTO: 31 PG (ref 27–31)
MCHC RBC AUTO-ENTMCNC: 33 % (ref 32–36)
MCV RBC AUTO: 91 FL (ref 79–98)
MONOCYTES # BLD MANUAL: 0.3 K/UL (ref 0–1)
MONOCYTES # BLD MANUAL: 5.4 % (ref 1.7–9.3)
NEUTROPHILS # BLD AUTO: 2.9 K/UL (ref 1.8–7.7)
NEUTROPHILS NFR BLD AUTO: 54.7 % (ref 40–70)
NITRITE UR QL STRIP: NEGATIVE
PH UR STRIP: 6.5 [PH] (ref 5–8)
PLATELET # BLD AUTO: 227 K/UL (ref 130–430)
POTASSIUM SERPL-SCNC: 4.2 MMOL/L (ref 3.5–5.1)
PROT UR QL STRIP: NEGATIVE
RBC # BLD AUTO: 3.69 MIL/UL (ref 4.2–6.2)
RBC #/AREA URNS HPF: (no result) /HPF (ref 0–3)
SODIUM SERPLBLD-SCNC: 139 MMOL/L (ref 136–145)
SP GR UR STRIP: 1.02 (ref 1–1.03)
UROBILINOGEN UR STRIP-MCNC: 0.2 MG/DL (ref 0.2–1)
WBC # BLD AUTO: 5.3 K/UL (ref 4.8–10.8)
WBC #/AREA URNS HPF: (no result) /HPF (ref 0–3)

## 2019-02-23 PROCEDURE — 99284 EMERGENCY DEPT VISIT MOD MDM: CPT

## 2019-02-23 PROCEDURE — 85025 COMPLETE CBC W/AUTO DIFF WBC: CPT

## 2019-02-23 PROCEDURE — 83690 ASSAY OF LIPASE: CPT

## 2019-02-23 PROCEDURE — 80053 COMPREHEN METABOLIC PANEL: CPT

## 2019-02-23 PROCEDURE — 81025 URINE PREGNANCY TEST: CPT

## 2019-02-23 PROCEDURE — 96375 TX/PRO/DX INJ NEW DRUG ADDON: CPT

## 2019-02-23 PROCEDURE — 36415 COLL VENOUS BLD VENIPUNCTURE: CPT

## 2019-02-23 PROCEDURE — 93005 ELECTROCARDIOGRAM TRACING: CPT

## 2019-02-23 PROCEDURE — 96374 THER/PROPH/DIAG INJ IV PUSH: CPT

## 2019-02-23 PROCEDURE — 81000 URINALYSIS NONAUTO W/SCOPE: CPT

## 2019-02-23 NOTE — NUR
Patient is awake, alert, and oriented x4.  Patient is complaining of 
pancreatitis, rapid heart rate.  History of migraines, auto-immune 
pancreatitis, and malabsorption.  Patient is complaining of sharp shooting 
epigastric pain 9/10 for 3 days.

-------------------------------------------------------------------------------

Addendum: 02/23/19 at 1814 by [email or web address]

-------------------------------------------------------------------------------

Port-a-cath to right chest.

## 2019-02-23 NOTE — NUR
Patient given written and verbal discharge instructions and verbalizes 
understanding.  ER MD discussed with patient the results and treatment 
provided. Patient in stable condition. ID arm band removed. 

no Rx of  given. Patient educated on pain management and to follow up with PMD. 
Pain Scale 4/10.

Opportunity for questions provided and answered. Medication side effect fact 
sheet provided.

## 2019-04-15 ENCOUNTER — HOSPITAL ENCOUNTER (EMERGENCY)
Dept: HOSPITAL 87 - ER | Age: 34
Discharge: HOME | End: 2019-04-15
Payer: MEDICARE

## 2019-04-15 VITALS — SYSTOLIC BLOOD PRESSURE: 108 MMHG | DIASTOLIC BLOOD PRESSURE: 64 MMHG

## 2019-04-15 VITALS — WEIGHT: 112.44 LBS | BODY MASS INDEX: 18.73 KG/M2 | HEIGHT: 65 IN

## 2019-04-15 DIAGNOSIS — R10.13: Primary | ICD-10-CM

## 2019-04-15 DIAGNOSIS — Z90.49: ICD-10-CM

## 2019-04-15 DIAGNOSIS — Z88.6: ICD-10-CM

## 2019-04-15 DIAGNOSIS — F68.10: ICD-10-CM

## 2019-04-15 DIAGNOSIS — Z87.442: ICD-10-CM

## 2019-04-15 DIAGNOSIS — Z88.2: ICD-10-CM

## 2019-04-15 DIAGNOSIS — Z91.040: ICD-10-CM

## 2019-04-15 DIAGNOSIS — Z98.890: ICD-10-CM

## 2019-04-15 DIAGNOSIS — I25.2: ICD-10-CM

## 2019-04-15 DIAGNOSIS — Z88.5: ICD-10-CM

## 2019-04-15 DIAGNOSIS — G89.4: ICD-10-CM

## 2019-04-15 DIAGNOSIS — R07.89: ICD-10-CM

## 2019-04-15 LAB
APPEARANCE UR: CLEAR
CHLORIDE SERPL-SCNC: 110 MEQ/L (ref 98–107)
COLOR UR: YELLOW
EOSINOPHIL NFR BLD MANUAL: 1 % (ref 0–5)
ERYTHROCYTE [DISTWIDTH] IN BLOOD BY AUTOMATED COUNT: 15.2 % (ref 11.6–14.6)
HCT VFR BLD AUTO: 36.1 % (ref 36–48)
HGB BLD-MCNC: 12.1 G/DL (ref 12–16)
HGB UR QL STRIP: NEGATIVE
KETONES UR STRIP-MCNC: NEGATIVE MG/DL
LEUKOCYTE ESTERASE UR QL STRIP: NEGATIVE
LYMPHOCYTES NFR BLD MANUAL: 9 % (ref 20–60)
MCH RBC QN AUTO: 31.1 PG (ref 28–32)
MCV RBC AUTO: 93 FL (ref 81–99)
MONOCYTES NFR BLD MANUAL: 2 % (ref 2–8)
NEUTS SEG NFR BLD MANUAL: 88 % (ref 45–75)
NITRITE UR QL STRIP: NEGATIVE
PH UR STRIP: 5.5 [PH] (ref 4.5–8)
PLATELET # BLD AUTO: 203 X1000/UL (ref 130–400)
PLATELET # BLD EST: NORMAL 10*3/UL
PMV BLD AUTO: 7 FL (ref 7.4–10.4)
PROT UR QL STRIP: NEGATIVE
RBC # BLD AUTO: 3.88 MILL/UL (ref 4.2–5.4)
SP GR UR STRIP: 1.01 (ref 1–1.03)
UROBILINOGEN UR STRIP-MCNC: 0.2 E.U./DL (ref 0.2–1)

## 2019-04-15 PROCEDURE — 74177 CT ABD & PELVIS W/CONTRAST: CPT

## 2019-04-15 PROCEDURE — 99284 EMERGENCY DEPT VISIT MOD MDM: CPT

## 2019-04-15 PROCEDURE — 84484 ASSAY OF TROPONIN QUANT: CPT

## 2019-04-15 PROCEDURE — 81025 URINE PREGNANCY TEST: CPT

## 2019-04-15 PROCEDURE — 96374 THER/PROPH/DIAG INJ IV PUSH: CPT

## 2019-04-15 PROCEDURE — 96376 TX/PRO/DX INJ SAME DRUG ADON: CPT

## 2019-04-15 PROCEDURE — 85379 FIBRIN DEGRADATION QUANT: CPT

## 2019-04-15 PROCEDURE — 83690 ASSAY OF LIPASE: CPT

## 2019-04-15 PROCEDURE — 71045 X-RAY EXAM CHEST 1 VIEW: CPT

## 2019-04-15 PROCEDURE — 81003 URINALYSIS AUTO W/O SCOPE: CPT

## 2019-04-15 PROCEDURE — 93005 ELECTROCARDIOGRAM TRACING: CPT

## 2019-04-15 PROCEDURE — 85025 COMPLETE CBC W/AUTO DIFF WBC: CPT

## 2019-04-15 PROCEDURE — 96375 TX/PRO/DX INJ NEW DRUG ADDON: CPT

## 2019-04-15 PROCEDURE — 80053 COMPREHEN METABOLIC PANEL: CPT

## 2019-04-15 PROCEDURE — 36415 COLL VENOUS BLD VENIPUNCTURE: CPT

## 2019-04-15 PROCEDURE — 83880 ASSAY OF NATRIURETIC PEPTIDE: CPT

## 2019-04-18 ENCOUNTER — HOSPITAL ENCOUNTER (EMERGENCY)
Dept: HOSPITAL 4 - SED | Age: 34
Discharge: HOME | End: 2019-04-18
Payer: COMMERCIAL

## 2019-04-18 VITALS — BODY MASS INDEX: 18.83 KG/M2 | HEIGHT: 65 IN | WEIGHT: 113 LBS

## 2019-04-18 VITALS — SYSTOLIC BLOOD PRESSURE: 122 MMHG

## 2019-04-18 VITALS — SYSTOLIC BLOOD PRESSURE: 110 MMHG

## 2019-04-18 DIAGNOSIS — Z88.0: ICD-10-CM

## 2019-04-18 DIAGNOSIS — K21.9: ICD-10-CM

## 2019-04-18 DIAGNOSIS — Z86.2: ICD-10-CM

## 2019-04-18 DIAGNOSIS — Z88.6: ICD-10-CM

## 2019-04-18 DIAGNOSIS — Z88.8: ICD-10-CM

## 2019-04-18 DIAGNOSIS — Z91.040: ICD-10-CM

## 2019-04-18 DIAGNOSIS — Z88.2: ICD-10-CM

## 2019-04-18 DIAGNOSIS — R10.9: Primary | ICD-10-CM

## 2019-04-18 DIAGNOSIS — Z88.1: ICD-10-CM

## 2019-04-18 DIAGNOSIS — Z88.5: ICD-10-CM

## 2019-04-18 LAB
ALBUMIN SERPL BCP-MCNC: 3.5 G/DL (ref 3.4–4.8)
ALT SERPL W P-5'-P-CCNC: 30 U/L (ref 12–78)
AMPHETAMINES UR QL SCN: NEGATIVE
ANION GAP SERPL CALCULATED.3IONS-SCNC: 5 MMOL/L (ref 5–15)
APPEARANCE UR: (no result)
AST SERPL W P-5'-P-CCNC: 20 U/L (ref 10–37)
BACTERIA URNS QL MICRO: (no result) /HPF
BARBITURATES UR QL SCN: NEGATIVE
BASOPHILS # BLD AUTO: 0 K/UL (ref 0–0.2)
BASOPHILS NFR BLD AUTO: 0.5 % (ref 0–2)
BENZODIAZ UR QL SCN: POSITIVE
BILIRUB SERPL-MCNC: 0.3 MG/DL (ref 0–1)
BILIRUB UR QL STRIP: NEGATIVE
BUN SERPL-MCNC: 11 MG/DL (ref 8–21)
BZE UR QL SCN: NEGATIVE
CALCIUM SERPL-MCNC: 8.9 MG/DL (ref 8.4–11)
CANNABINOIDS UR QL SCN: NEGATIVE
CHLORIDE SERPL-SCNC: 102 MMOL/L (ref 98–107)
COLOR UR: YELLOW
CREAT SERPL-MCNC: 0.57 MG/DL (ref 0.55–1.3)
EOSINOPHIL # BLD AUTO: 0.1 K/UL (ref 0–0.4)
EOSINOPHIL NFR BLD AUTO: 1.3 % (ref 0–4)
ERYTHROCYTE [DISTWIDTH] IN BLOOD BY AUTOMATED COUNT: 14.9 % (ref 9–15)
GFR SERPL CREATININE-BSD FRML MDRD: 156 ML/MIN (ref 90–?)
GLUCOSE SERPL-MCNC: 109 MG/DL (ref 70–99)
GLUCOSE UR STRIP-MCNC: NEGATIVE MG/DL
HCT VFR BLD AUTO: 32.2 % (ref 36–48)
HGB BLD-MCNC: 10.9 G/DL (ref 12–16)
HGB UR QL STRIP: (no result)
KETONES UR STRIP-MCNC: NEGATIVE MG/DL
LEUKOCYTE ESTERASE UR QL STRIP: NEGATIVE
LIPASE SERPL-CCNC: 303 U/L (ref 73–393)
LYMPHOCYTES # BLD AUTO: 2.2 K/UL (ref 1–5.5)
LYMPHOCYTES NFR BLD AUTO: 37.7 % (ref 20.5–51.5)
MCH RBC QN AUTO: 31 PG (ref 27–31)
MCHC RBC AUTO-ENTMCNC: 34 % (ref 32–36)
MCV RBC AUTO: 93 FL (ref 79–98)
METHADONE UR-SCNC: NEGATIVE UMOL/L
METHAMPHET UR-SCNC: NEGATIVE UMOL/L
MONOCYTES # BLD MANUAL: 0.4 K/UL (ref 0–1)
MONOCYTES # BLD MANUAL: 7.4 % (ref 1.7–9.3)
NEUTROPHILS # BLD AUTO: 3.1 K/UL (ref 1.8–7.7)
NEUTROPHILS NFR BLD AUTO: 53.1 % (ref 40–70)
NITRITE UR QL STRIP: NEGATIVE
OPIATES UR QL SCN: POSITIVE
OXYCODONE SERPL-MCNC: NEGATIVE NG/ML
PCP UR QL SCN: NEGATIVE
PH UR STRIP: 7 [PH] (ref 5–8)
PLATELET # BLD AUTO: 238 K/UL (ref 130–430)
POTASSIUM SERPL-SCNC: 3.7 MMOL/L (ref 3.5–5.1)
PROT UR QL STRIP: NEGATIVE
RBC # BLD AUTO: 3.48 MIL/UL (ref 4.2–6.2)
RBC #/AREA URNS HPF: (no result) /HPF (ref 0–3)
SODIUM SERPLBLD-SCNC: 135 MMOL/L (ref 136–145)
SP GR UR STRIP: 1.01 (ref 1–1.03)
TRICYCLICS UR-MCNC: NEGATIVE NG/ML
URINE PROPOXYPHENE SCREEN: NEGATIVE
UROBILINOGEN UR STRIP-MCNC: 0.2 MG/DL (ref 0.2–1)
WBC # BLD AUTO: 5.8 K/UL (ref 4.8–10.8)
WBC #/AREA URNS HPF: (no result) /HPF (ref 0–3)

## 2019-04-18 PROCEDURE — 96375 TX/PRO/DX INJ NEW DRUG ADDON: CPT

## 2019-04-18 PROCEDURE — 99284 EMERGENCY DEPT VISIT MOD MDM: CPT

## 2019-04-18 PROCEDURE — 36415 COLL VENOUS BLD VENIPUNCTURE: CPT

## 2019-04-18 PROCEDURE — 96374 THER/PROPH/DIAG INJ IV PUSH: CPT

## 2019-04-18 PROCEDURE — 85025 COMPLETE CBC W/AUTO DIFF WBC: CPT

## 2019-04-18 PROCEDURE — 80307 DRUG TEST PRSMV CHEM ANLYZR: CPT

## 2019-04-18 PROCEDURE — 83690 ASSAY OF LIPASE: CPT

## 2019-04-18 PROCEDURE — 74176 CT ABD & PELVIS W/O CONTRAST: CPT

## 2019-04-18 PROCEDURE — 80053 COMPREHEN METABOLIC PANEL: CPT

## 2019-04-18 PROCEDURE — 96372 THER/PROPH/DIAG INJ SC/IM: CPT

## 2019-04-18 PROCEDURE — 81000 URINALYSIS NONAUTO W/SCOPE: CPT

## 2019-04-18 NOTE — NUR
Family at bedside, lights dimmed for patient comfort. Awaiting dispo, will 
continue to observe and assess.

## 2019-04-18 NOTE — NUR
Patient given written and verbal discharge instructions and verbalizes 
understanding.  ER MD discussed with patient the results and treatment 
provided. Patient in stable condition. ID arm band removed. 

No RX given. Patient educated on pain management and to follow up with PMD. 
Pain Scale 2.

Opportunity for questions provided and answered. Medication side effect fact 
sheet provided. 



Patient left ER in no acute distress, able to ambulate without difficulty with 
slow, steady gait with family at her side. No adverse reaction noted to 
medication.

## 2019-04-18 NOTE — NUR
Patient up ambulating to bathroom without difficulty with slow, steady gait. 
Awaiting results and dispo.

## 2019-04-18 NOTE — NUR
Patient arrives to ER with Megan-Cath to right chest which has been accessed 
prior to arrival. Patient reports that her home health nurse accessed it 
yesterday. Blood specimens obtained and sent to lab. IV fluids infusing without 
difficulty.

## 2019-04-18 NOTE — NUR
Patient reports no change in pain, however, patient is able to ambulate without 
difficulty with slow, steady gait. Respirations are slow, and even. IV fluids 
infusing without difficulty, awaiting lab results and dispo.

## 2019-04-18 NOTE — NUR
Patient placing multiple patches on body to relieve general body aches. These 
were brought in by patient and not supplied by ER. Patient also seen going 
through drawers in room 8.

## 2019-04-18 NOTE — NUR
Patient up ambulating in hallway without difficulty with slow, steady gait. 
Patient reports that she is having a rash that is itching. Dr Akins is 
speaking with patient at length, order for Benadryl 25 mg IM received. No 
respiratory distress noted.

## 2019-04-18 NOTE — NUR
Patient to ER via triage for evaluation of right sided flank pain, along with 
dysuria, nausea/vomiting. Patient is awake, alert and oriented in no acute 
distress, HR elevated but vital signs otherwise stable, respirations even and 
unlabored, skin warm and dry to touch. Patient able to ambulate to bed 8 
without difficulty with slow, steady gait. Patient provided urine sample which 
was sent to lab. Awaiting evaluation by ER MD, will continue to observe and 
assess.

## 2019-05-15 NOTE — SERVICENOTES
No new images today Chief Complaint:  Finger Pain (L THUMB)      History of Present of Illness:  Mr. Kash Pineda is a 53-year-old male presenting his repeat scheduled postoperative visit after left thumb surgery  Procedure: Repair of left thumb MP joint ulnar collateral ligament  Date of procedure: 3/19/2019  He presents nearly 2 months status post surgery. He reports to be doing well, denies any current symptoms in his left thumb. He has been on limited duty at work and does not have any complaints of new pain, no mechanical symptoms. He has been starting some light strengthening activities since his last visit. The numbness and tingling near his dorsal thumb has improved since his last visit      Examination:  General: Pleasant, well-appearing, no acute distress  Left upper extremity:  Well-healed incision with nontender scar soft and no evidence of hypertrophy     Normal thumb tenodesis and cascade with active thumb EPL and FPL with nearly symmetrical range of motion in all planes of thumb motion at MP and IP joints  Sensation grossly intact throughout the radial and ulnar aspect of thumb except for a small area approximately 1 cm just distal to the incision at the dorsal ulnar aspect of thumb  Capillary refill brisk in thumb tip        Radiology:     X-rays obtained and reviewed in office: No additional images obtained today  No orders of the defined types were placed in this encounter. Impression:  Encounter Diagnoses   Name Primary?  Rupture of ulnar collateral ligament of left thumb, initial encounter Yes    Injury of left thumb, initial encounter          Treatment Plan: We will continue with restricted duty over the next 2 weeks. Okay to return to duty without restrictions 10 weeks after surgery. Continue with progressive strengthening with guidance from occupational therapy. The patient appears to be doing very well with regards to his range of motion.  I will see him back in approximately 1 month for I really need to look at the old records.

The plan is to put the bowel at rest for the next ninety days or more and begin TPN to maintain nutrition and weight.

A request for a pharmacy consult to begin TPN has been written

## 2019-06-03 ENCOUNTER — HOSPITAL ENCOUNTER (EMERGENCY)
Dept: HOSPITAL 87 - ER | Age: 34
Discharge: LEFT BEFORE DISCHARGE | End: 2019-06-03
Payer: MEDICARE

## 2019-06-03 VITALS — BODY MASS INDEX: 18.37 KG/M2 | HEIGHT: 65 IN | WEIGHT: 110.23 LBS

## 2019-06-03 VITALS — SYSTOLIC BLOOD PRESSURE: 121 MMHG | DIASTOLIC BLOOD PRESSURE: 75 MMHG

## 2019-06-03 DIAGNOSIS — R31.29: ICD-10-CM

## 2019-06-03 DIAGNOSIS — G43.909: ICD-10-CM

## 2019-06-03 DIAGNOSIS — G89.29: ICD-10-CM

## 2019-06-03 DIAGNOSIS — Z90.49: ICD-10-CM

## 2019-06-03 DIAGNOSIS — Z88.5: ICD-10-CM

## 2019-06-03 DIAGNOSIS — Z91.040: ICD-10-CM

## 2019-06-03 DIAGNOSIS — R30.0: ICD-10-CM

## 2019-06-03 DIAGNOSIS — Z88.2: ICD-10-CM

## 2019-06-03 DIAGNOSIS — R10.31: Primary | ICD-10-CM

## 2019-06-03 LAB
APPEARANCE UR: CLEAR
BASOPHILS NFR BLD AUTO: 0.9 % (ref 0–2)
CHLORIDE SERPL-SCNC: 106 MEQ/L (ref 98–107)
COLOR UR: YELLOW
EOSINOPHIL NFR BLD AUTO: 0.6 % (ref 0–5)
ERYTHROCYTE [DISTWIDTH] IN BLOOD BY AUTOMATED COUNT: 13.9 % (ref 11.6–14.6)
HCT VFR BLD AUTO: 38.4 % (ref 36–48)
HGB BLD-MCNC: 13.1 G/DL (ref 12–16)
HGB UR QL STRIP: (no result)
INR PPP: 1
KETONES UR STRIP-MCNC: NEGATIVE MG/DL
LEUKOCYTE ESTERASE UR QL STRIP: NEGATIVE
LYMPHOCYTES NFR BLD AUTO: 30.5 % (ref 20–50)
MCH RBC QN AUTO: 31.9 PG (ref 28–32)
MCV RBC AUTO: 93.3 FL (ref 81–99)
MONOCYTES NFR BLD AUTO: 5 % (ref 2–8)
NEUTROPHILS NFR BLD AUTO: 63 % (ref 40–76)
NITRITE UR QL STRIP: NEGATIVE
PH UR STRIP: 6 [PH] (ref 4.5–8)
PLATELET # BLD AUTO: 242 X1000/UL (ref 130–400)
PMV BLD AUTO: 6.6 FL (ref 7.4–10.4)
PROT UR QL STRIP: NEGATIVE
PROTHROMBIN TIME: 10.7 SEC (ref 9.6–11)
RBC # BLD AUTO: 4.12 MILL/UL (ref 4.2–5.4)
SP GR UR STRIP: 1.02 (ref 1–1.03)
UROBILINOGEN UR STRIP-MCNC: 0.2 E.U./DL (ref 0.2–1)

## 2019-06-03 PROCEDURE — 36415 COLL VENOUS BLD VENIPUNCTURE: CPT

## 2019-06-03 PROCEDURE — 81025 URINE PREGNANCY TEST: CPT

## 2019-06-03 PROCEDURE — 87040 BLOOD CULTURE FOR BACTERIA: CPT

## 2019-06-03 PROCEDURE — 96366 THER/PROPH/DIAG IV INF ADDON: CPT

## 2019-06-03 PROCEDURE — 96365 THER/PROPH/DIAG IV INF INIT: CPT

## 2019-06-03 PROCEDURE — 96375 TX/PRO/DX INJ NEW DRUG ADDON: CPT

## 2019-06-03 PROCEDURE — 83690 ASSAY OF LIPASE: CPT

## 2019-06-03 PROCEDURE — 85610 PROTHROMBIN TIME: CPT

## 2019-06-03 PROCEDURE — 87086 URINE CULTURE/COLONY COUNT: CPT

## 2019-06-03 PROCEDURE — 81003 URINALYSIS AUTO W/O SCOPE: CPT

## 2019-06-03 PROCEDURE — 85025 COMPLETE CBC W/AUTO DIFF WBC: CPT

## 2019-06-03 PROCEDURE — 76770 US EXAM ABDO BACK WALL COMP: CPT

## 2019-06-03 PROCEDURE — 99284 EMERGENCY DEPT VISIT MOD MDM: CPT

## 2019-06-03 PROCEDURE — 80053 COMPREHEN METABOLIC PANEL: CPT

## 2020-05-24 ENCOUNTER — HOSPITAL ENCOUNTER (EMERGENCY)
Dept: HOSPITAL 4 - SED | Age: 35
Discharge: LEFT BEFORE DISCHARGE | End: 2020-05-24
Payer: COMMERCIAL

## 2020-05-24 VITALS — SYSTOLIC BLOOD PRESSURE: 140 MMHG

## 2020-05-24 VITALS — WEIGHT: 110 LBS | BODY MASS INDEX: 18.78 KG/M2 | HEIGHT: 64 IN | SYSTOLIC BLOOD PRESSURE: 140 MMHG

## 2020-05-24 DIAGNOSIS — R11.10: ICD-10-CM

## 2020-05-24 DIAGNOSIS — K21.9: ICD-10-CM

## 2020-05-24 DIAGNOSIS — G43.909: ICD-10-CM

## 2020-05-24 DIAGNOSIS — Z90.49: ICD-10-CM

## 2020-05-24 DIAGNOSIS — Z88.0: ICD-10-CM

## 2020-05-24 DIAGNOSIS — Z88.1: ICD-10-CM

## 2020-05-24 DIAGNOSIS — Z88.8: ICD-10-CM

## 2020-05-24 DIAGNOSIS — Z88.6: ICD-10-CM

## 2020-05-24 DIAGNOSIS — Z88.2: ICD-10-CM

## 2020-05-24 DIAGNOSIS — Z91.040: ICD-10-CM

## 2020-05-24 DIAGNOSIS — R10.13: Primary | ICD-10-CM

## 2020-05-24 LAB
ALBUMIN SERPL BCP-MCNC: 4 G/DL (ref 3.4–4.8)
ALT SERPL W P-5'-P-CCNC: 51 U/L (ref 12–78)
ANION GAP SERPL CALCULATED.3IONS-SCNC: 5 MMOL/L (ref 5–15)
AST SERPL W P-5'-P-CCNC: 31 U/L (ref 10–37)
BASOPHILS # BLD AUTO: 0 K/UL (ref 0–0.2)
BASOPHILS NFR BLD AUTO: 0.5 % (ref 0–2)
BILIRUB SERPL-MCNC: 0.2 MG/DL (ref 0–1)
BUN SERPL-MCNC: 20 MG/DL (ref 8–21)
CALCIUM SERPL-MCNC: 8.2 MG/DL (ref 8.4–11)
CHLORIDE SERPL-SCNC: 104 MMOL/L (ref 98–107)
CREAT SERPL-MCNC: 0.72 MG/DL (ref 0.55–1.3)
EOSINOPHIL # BLD AUTO: 0 K/UL (ref 0–0.4)
EOSINOPHIL NFR BLD AUTO: 0.9 % (ref 0–4)
ERYTHROCYTE [DISTWIDTH] IN BLOOD BY AUTOMATED COUNT: 14.3 % (ref 9–15)
GFR SERPL CREATININE-BSD FRML MDRD: 119 ML/MIN (ref 90–?)
GLUCOSE SERPL-MCNC: 92 MG/DL (ref 70–99)
HCT VFR BLD AUTO: 39.3 % (ref 36–48)
HGB BLD-MCNC: 12.8 G/DL (ref 12–16)
LIPASE SERPL-CCNC: 333 U/L (ref 73–393)
LYMPHOCYTES # BLD AUTO: 1.7 K/UL (ref 1–5.5)
LYMPHOCYTES NFR BLD AUTO: 31.1 % (ref 20.5–51.5)
MCH RBC QN AUTO: 29 PG (ref 27–31)
MCHC RBC AUTO-ENTMCNC: 33 % (ref 32–36)
MCV RBC AUTO: 90 FL (ref 79–98)
MONOCYTES # BLD MANUAL: 0.3 K/UL (ref 0–1)
MONOCYTES # BLD MANUAL: 4.9 % (ref 1.7–9.3)
NEUTROPHILS # BLD AUTO: 3.3 K/UL (ref 1.8–7.7)
NEUTROPHILS NFR BLD AUTO: 62.6 % (ref 40–70)
PLATELET # BLD AUTO: 210 K/UL (ref 130–430)
POTASSIUM SERPL-SCNC: 4 MMOL/L (ref 3.5–5.1)
RBC # BLD AUTO: 4.38 MIL/UL (ref 4.2–6.2)
SODIUM SERPLBLD-SCNC: 139 MMOL/L (ref 136–145)
WBC # BLD AUTO: 5.4 K/UL (ref 4.8–10.8)

## 2020-05-24 PROCEDURE — 96375 TX/PRO/DX INJ NEW DRUG ADDON: CPT

## 2020-05-24 PROCEDURE — 86900 BLOOD TYPING SEROLOGIC ABO: CPT

## 2020-05-24 PROCEDURE — 80053 COMPREHEN METABOLIC PANEL: CPT

## 2020-05-24 PROCEDURE — 99284 EMERGENCY DEPT VISIT MOD MDM: CPT

## 2020-05-24 PROCEDURE — 96374 THER/PROPH/DIAG INJ IV PUSH: CPT

## 2020-05-24 PROCEDURE — 83690 ASSAY OF LIPASE: CPT

## 2020-05-24 PROCEDURE — 96361 HYDRATE IV INFUSION ADD-ON: CPT

## 2020-05-24 PROCEDURE — 81002 URINALYSIS NONAUTO W/O SCOPE: CPT

## 2020-05-24 PROCEDURE — 86901 BLOOD TYPING SEROLOGIC RH(D): CPT

## 2020-05-24 PROCEDURE — 86870 RBC ANTIBODY IDENTIFICATION: CPT

## 2020-05-24 PROCEDURE — 85025 COMPLETE CBC W/AUTO DIFF WBC: CPT

## 2020-05-24 PROCEDURE — 36415 COLL VENOUS BLD VENIPUNCTURE: CPT

## 2020-05-24 PROCEDURE — 86886 COOMBS TEST INDIRECT TITER: CPT

## 2020-05-24 NOTE — NUR
Patient arrived in the ED c/o abdominal pain, hemoptysis, bleeding ulcer, 
fevers, diarrhea, nausea and vomiting.  Denied any chest pain or shortness of 
breath.  Patient is alert and oriented x4, respirations even and unlabored, 
speaking in full sentences, and ambulating with a steady gait.  VSS, pain level 
9/10.  Informed of the approximate wait time.  Instructed to notify ED staff 
for any changes in condition or worsening of symptoms.  Patient verbalized 
understanding.

## 2020-05-24 NOTE — NUR
Administered NS, Pepcid, Zofran, Fentanyl IVP as ordered by Dr. Mckeon.  
Patient tolerated the medications well.  See eMAR for details.

## 2020-05-24 NOTE — NUR
Patient does not wish to proceed with medical care recommended by Dr. Mckeon.  
Patient given information related to possible complications, up to and 
including death, which could occur as a result of leaving hospital at this 
time.  Patient verbalizes understanding of risks involved leaving against 
medical advice.  Patient has signed AMA form.

## 2020-05-24 NOTE — NUR
Administered Benadryl and Morphine Sulfate IVP as ordered by Dr. Mckeon.  
Patient tolerated the medications well.  See eMAR for details.

## 2020-05-24 NOTE — NUR
Administered Ativan IVP as ordered by Dr. Mckeon.  Patient tolerated the 
medications well.  See eMAR for details.

## 2020-05-28 ENCOUNTER — HOSPITAL ENCOUNTER (EMERGENCY)
Dept: HOSPITAL 4 - SED | Age: 35
Discharge: LEFT BEFORE DISCHARGE | End: 2020-05-28
Payer: COMMERCIAL

## 2020-05-28 VITALS — WEIGHT: 115 LBS | BODY MASS INDEX: 19.16 KG/M2 | SYSTOLIC BLOOD PRESSURE: 118 MMHG | HEIGHT: 65 IN

## 2020-05-28 VITALS — SYSTOLIC BLOOD PRESSURE: 118 MMHG

## 2020-05-28 DIAGNOSIS — Z88.1: ICD-10-CM

## 2020-05-28 DIAGNOSIS — Z88.2: ICD-10-CM

## 2020-05-28 DIAGNOSIS — N64.4: Primary | ICD-10-CM

## 2020-05-28 DIAGNOSIS — Z91.040: ICD-10-CM

## 2020-05-28 DIAGNOSIS — Z88.5: ICD-10-CM

## 2020-05-28 DIAGNOSIS — Z88.0: ICD-10-CM

## 2020-05-28 DIAGNOSIS — Z88.6: ICD-10-CM

## 2020-05-28 DIAGNOSIS — Z86.2: ICD-10-CM

## 2020-05-28 DIAGNOSIS — F11.90: ICD-10-CM

## 2020-05-28 DIAGNOSIS — K21.9: ICD-10-CM

## 2020-05-28 DIAGNOSIS — M54.2: ICD-10-CM

## 2020-05-28 NOTE — NUR
-------------------------------------------------------------------------------

            *** Note inna in ED - 05/28/20 at 0805 by SDEDDW ***            

-------------------------------------------------------------------------------

Patient to ER bed 7 to gown for evaluation. Side rails up.

## 2020-05-28 NOTE — NUR
DR SOLOMON SPEAKING WITH PT ABOUT PTS PAIN. PT STATES SHE NEEDS NARCOTICS, DR SOLOMON 
OFFERED TO DO BLOOD WORK. PT BEGGING FOR OPIODS AND ANY NARCOTICS, DR SOLOMON 
STATES AFTER BLOOD WORK, WE WOULD RE-EVALUATE. PT STATES "NO THANK YOU" AND 
DECIDED TO LEAVE. PT ELOPED AFTER BEING SEEN BY DR SOLOMON.

## 2020-05-28 NOTE — NUR
Pt came to ER c/o abd pain, HA 10/10. Pt states nausea but has no episodes of 
vomiting at this time. VSS, ressting in [facility], awaiting MD.

## 2020-05-28 NOTE — NUR
CHAPERONE FOR BREAST EXAM ON PATIENT. 

-------------------------------------------------------------------------------

Addendum: 05/28/20 at 0823 by DEBORAH

-------------------------------------------------------------------------------

BREAST EXAM DONE AT 0808

## 2020-06-14 ENCOUNTER — HOSPITAL ENCOUNTER (EMERGENCY)
Dept: HOSPITAL 54 - ER | Age: 35
Discharge: HOME | End: 2020-06-14
Payer: MEDICARE

## 2020-06-14 VITALS — HEIGHT: 65 IN | BODY MASS INDEX: 19.16 KG/M2 | WEIGHT: 115 LBS

## 2020-06-14 VITALS — DIASTOLIC BLOOD PRESSURE: 87 MMHG | SYSTOLIC BLOOD PRESSURE: 100 MMHG

## 2020-06-14 DIAGNOSIS — G89.29: ICD-10-CM

## 2020-06-14 DIAGNOSIS — Z91.040: ICD-10-CM

## 2020-06-14 DIAGNOSIS — Z88.8: ICD-10-CM

## 2020-06-14 DIAGNOSIS — G43.909: ICD-10-CM

## 2020-06-14 DIAGNOSIS — R14.0: ICD-10-CM

## 2020-06-14 DIAGNOSIS — M35.9: ICD-10-CM

## 2020-06-14 DIAGNOSIS — Z98.890: ICD-10-CM

## 2020-06-14 DIAGNOSIS — T81.31XA: Primary | ICD-10-CM

## 2020-06-14 DIAGNOSIS — Z88.6: ICD-10-CM

## 2020-06-14 DIAGNOSIS — N64.4: ICD-10-CM

## 2020-06-14 DIAGNOSIS — K59.00: ICD-10-CM

## 2020-06-14 DIAGNOSIS — K21.9: ICD-10-CM

## 2020-06-14 DIAGNOSIS — Z95.828: ICD-10-CM

## 2020-06-14 DIAGNOSIS — K22.8: ICD-10-CM

## 2020-06-14 DIAGNOSIS — Z79.899: ICD-10-CM

## 2020-06-14 DIAGNOSIS — Z88.0: ICD-10-CM

## 2020-06-14 DIAGNOSIS — R11.2: ICD-10-CM

## 2020-06-14 DIAGNOSIS — Z90.89: ICD-10-CM

## 2020-06-14 LAB
ALBUMIN SERPL BCP-MCNC: 4.1 G/DL (ref 3.4–5)
ALP SERPL-CCNC: 92 U/L (ref 46–116)
ALT SERPL W P-5'-P-CCNC: 49 U/L (ref 12–78)
AST SERPL W P-5'-P-CCNC: 32 U/L (ref 15–37)
BASOPHILS # BLD AUTO: 0 /CMM (ref 0–0.2)
BASOPHILS NFR BLD AUTO: 0.2 % (ref 0–2)
BILIRUB DIRECT SERPL-MCNC: 0.1 MG/DL (ref 0–0.2)
BILIRUB SERPL-MCNC: 0.2 MG/DL (ref 0.2–1)
BUN SERPL-MCNC: 18 MG/DL (ref 7–18)
CALCIUM SERPL-MCNC: 9.1 MG/DL (ref 8.5–10.1)
CHLORIDE SERPL-SCNC: 104 MMOL/L (ref 98–107)
CO2 SERPL-SCNC: 26 MMOL/L (ref 21–32)
CREAT SERPL-MCNC: 0.8 MG/DL (ref 0.6–1.3)
EOSINOPHIL NFR BLD AUTO: 0.7 % (ref 0–6)
GLUCOSE SERPL-MCNC: 93 MG/DL (ref 74–106)
HCT VFR BLD AUTO: 38 % (ref 33–45)
HGB BLD-MCNC: 12.3 G/DL (ref 11.5–14.8)
LIPASE SERPL-CCNC: 281 U/L (ref 73–393)
LYMPHOCYTES NFR BLD AUTO: 1.7 /CMM (ref 0.8–4.8)
LYMPHOCYTES NFR BLD AUTO: 30.1 % (ref 20–44)
MCHC RBC AUTO-ENTMCNC: 33 G/DL (ref 31–36)
MCV RBC AUTO: 88 FL (ref 82–100)
MONOCYTES NFR BLD AUTO: 0.4 /CMM (ref 0.1–1.3)
MONOCYTES NFR BLD AUTO: 7.5 % (ref 2–12)
NEUTROPHILS # BLD AUTO: 3.6 /CMM (ref 1.8–8.9)
NEUTROPHILS NFR BLD AUTO: 61.5 % (ref 43–81)
PH UR STRIP: 5 [PH] (ref 5–8)
PLATELET # BLD AUTO: 221 /CMM (ref 150–450)
POTASSIUM SERPL-SCNC: 4 MMOL/L (ref 3.5–5.1)
PROT SERPL-MCNC: 7.4 G/DL (ref 6.4–8.2)
RBC # BLD AUTO: 4.27 MIL/UL (ref 4–5.2)
RBC #/AREA URNS HPF: (no result) /HPF (ref 0–2)
SODIUM SERPL-SCNC: 136 MMOL/L (ref 136–145)
UROBILINOGEN UR STRIP-MCNC: 0.2 EU/DL
WBC #/AREA URNS HPF: (no result) /HPF (ref 0–3)
WBC NRBC COR # BLD AUTO: 5.8 K/UL (ref 4.3–11)

## 2020-06-14 PROCEDURE — 36415 COLL VENOUS BLD VENIPUNCTURE: CPT

## 2020-06-14 PROCEDURE — 71260 CT THORAX DX C+: CPT

## 2020-06-14 PROCEDURE — 96375 TX/PRO/DX INJ NEW DRUG ADDON: CPT

## 2020-06-14 PROCEDURE — 80076 HEPATIC FUNCTION PANEL: CPT

## 2020-06-14 PROCEDURE — 83690 ASSAY OF LIPASE: CPT

## 2020-06-14 PROCEDURE — 84484 ASSAY OF TROPONIN QUANT: CPT

## 2020-06-14 PROCEDURE — 83605 ASSAY OF LACTIC ACID: CPT

## 2020-06-14 PROCEDURE — 80048 BASIC METABOLIC PNL TOTAL CA: CPT

## 2020-06-14 PROCEDURE — 74177 CT ABD & PELVIS W/CONTRAST: CPT

## 2020-06-14 PROCEDURE — 87040 BLOOD CULTURE FOR BACTERIA: CPT

## 2020-06-14 PROCEDURE — 87070 CULTURE OTHR SPECIMN AEROBIC: CPT

## 2020-06-14 PROCEDURE — 93005 ELECTROCARDIOGRAM TRACING: CPT

## 2020-06-14 PROCEDURE — 96376 TX/PRO/DX INJ SAME DRUG ADON: CPT

## 2020-06-14 PROCEDURE — 81001 URINALYSIS AUTO W/SCOPE: CPT

## 2020-06-14 PROCEDURE — 96361 HYDRATE IV INFUSION ADD-ON: CPT

## 2020-06-14 PROCEDURE — 99285 EMERGENCY DEPT VISIT HI MDM: CPT

## 2020-06-14 PROCEDURE — 96374 THER/PROPH/DIAG INJ IV PUSH: CPT

## 2020-06-14 PROCEDURE — 85025 COMPLETE CBC W/AUTO DIFF WBC: CPT

## 2020-06-14 PROCEDURE — 84703 CHORIONIC GONADOTROPIN ASSAY: CPT

## 2020-06-14 NOTE — NUR
PT CAME INTO THE ED  FOR ABDOMINAL +N/V/D X 1 DAY. PT ALSO COMPLAINS OF LEFT 
SIDED BREAST PAIN. PT STATES SHE HAD PRIOR BREAST INFECTION D/T RUPTURED BREAST 
IMPLANT. PT AAOX4, VSS, RESPIRATIONS EVEN AND UNLABORED ON RA W/ AND NOTED. PT 
CONNECTED TO THE MONITOR AND POX

## 2020-06-17 ENCOUNTER — HOSPITAL ENCOUNTER (EMERGENCY)
Dept: HOSPITAL 54 - ER | Age: 35
Discharge: HOME | End: 2020-06-17
Payer: MEDICARE

## 2020-06-17 VITALS — DIASTOLIC BLOOD PRESSURE: 62 MMHG | SYSTOLIC BLOOD PRESSURE: 123 MMHG

## 2020-06-17 VITALS — BODY MASS INDEX: 20.83 KG/M2 | HEIGHT: 65 IN | WEIGHT: 125 LBS

## 2020-06-17 DIAGNOSIS — N93.8: ICD-10-CM

## 2020-06-17 DIAGNOSIS — Z88.8: ICD-10-CM

## 2020-06-17 DIAGNOSIS — K21.9: ICD-10-CM

## 2020-06-17 DIAGNOSIS — G43.909: ICD-10-CM

## 2020-06-17 DIAGNOSIS — E27.8: ICD-10-CM

## 2020-06-17 DIAGNOSIS — F68.10: ICD-10-CM

## 2020-06-17 DIAGNOSIS — Z98.890: ICD-10-CM

## 2020-06-17 DIAGNOSIS — R10.30: Primary | ICD-10-CM

## 2020-06-17 LAB
ALBUMIN SERPL BCP-MCNC: 3.6 G/DL (ref 3.4–5)
ALP SERPL-CCNC: 79 U/L (ref 46–116)
ALT SERPL W P-5'-P-CCNC: 34 U/L (ref 12–78)
AST SERPL W P-5'-P-CCNC: 17 U/L (ref 15–37)
BASOPHILS # BLD AUTO: 0 /CMM (ref 0–0.2)
BASOPHILS NFR BLD AUTO: 0.1 % (ref 0–2)
BILIRUB DIRECT SERPL-MCNC: 0.1 MG/DL (ref 0–0.2)
BILIRUB SERPL-MCNC: 0.2 MG/DL (ref 0.2–1)
BUN SERPL-MCNC: 21 MG/DL (ref 7–18)
CALCIUM SERPL-MCNC: 8.6 MG/DL (ref 8.5–10.1)
CHLORIDE SERPL-SCNC: 106 MMOL/L (ref 98–107)
CO2 SERPL-SCNC: 27 MMOL/L (ref 21–32)
CREAT SERPL-MCNC: 0.8 MG/DL (ref 0.6–1.3)
EOSINOPHIL NFR BLD AUTO: 0.1 % (ref 0–6)
GLUCOSE SERPL-MCNC: 104 MG/DL (ref 74–106)
HCT VFR BLD AUTO: 33 % (ref 33–45)
HGB BLD-MCNC: 11 G/DL (ref 11.5–14.8)
LIPASE SERPL-CCNC: 277 U/L (ref 73–393)
LYMPHOCYTES NFR BLD AUTO: 1.6 /CMM (ref 0.8–4.8)
LYMPHOCYTES NFR BLD AUTO: 20 % (ref 20–44)
MCHC RBC AUTO-ENTMCNC: 33 G/DL (ref 31–36)
MCV RBC AUTO: 88 FL (ref 82–100)
MONOCYTES NFR BLD AUTO: 0.6 /CMM (ref 0.1–1.3)
MONOCYTES NFR BLD AUTO: 6.9 % (ref 2–12)
NEUTROPHILS # BLD AUTO: 6 /CMM (ref 1.8–8.9)
NEUTROPHILS NFR BLD AUTO: 72.9 % (ref 43–81)
PH UR STRIP: 5.5 [PH] (ref 5–8)
PLATELET # BLD AUTO: 218 /CMM (ref 150–450)
POTASSIUM SERPL-SCNC: 3.7 MMOL/L (ref 3.5–5.1)
PROT SERPL-MCNC: 6.3 G/DL (ref 6.4–8.2)
RBC # BLD AUTO: 3.75 MIL/UL (ref 4–5.2)
RBC #/AREA URNS HPF: (no result) /HPF (ref 0–2)
SODIUM SERPL-SCNC: 140 MMOL/L (ref 136–145)
UROBILINOGEN UR STRIP-MCNC: 0.2 EU/DL
WBC #/AREA URNS HPF: (no result) /HPF (ref 0–3)
WBC NRBC COR # BLD AUTO: 8.2 K/UL (ref 4.3–11)

## 2020-06-17 PROCEDURE — 83690 ASSAY OF LIPASE: CPT

## 2020-06-17 PROCEDURE — 84703 CHORIONIC GONADOTROPIN ASSAY: CPT

## 2020-06-17 PROCEDURE — 96374 THER/PROPH/DIAG INJ IV PUSH: CPT

## 2020-06-17 PROCEDURE — 80048 BASIC METABOLIC PNL TOTAL CA: CPT

## 2020-06-17 PROCEDURE — 99285 EMERGENCY DEPT VISIT HI MDM: CPT

## 2020-06-17 PROCEDURE — 84702 CHORIONIC GONADOTROPIN TEST: CPT

## 2020-06-17 PROCEDURE — C1751 CATH, INF, PER/CENT/MIDLINE: HCPCS

## 2020-06-17 PROCEDURE — 36415 COLL VENOUS BLD VENIPUNCTURE: CPT

## 2020-06-17 PROCEDURE — 76856 US EXAM PELVIC COMPLETE: CPT

## 2020-06-17 PROCEDURE — 74177 CT ABD & PELVIS W/CONTRAST: CPT

## 2020-06-17 PROCEDURE — 85025 COMPLETE CBC W/AUTO DIFF WBC: CPT

## 2020-06-17 PROCEDURE — 80076 HEPATIC FUNCTION PANEL: CPT

## 2020-06-17 PROCEDURE — 81001 URINALYSIS AUTO W/SCOPE: CPT

## 2020-06-17 PROCEDURE — 96375 TX/PRO/DX INJ NEW DRUG ADDON: CPT

## 2020-06-17 NOTE — NUR
PT AMBULATORY TO ER BED 16 C/O LOWER ABDOMINAL PAIN AND VAGINAL DISCHARGE SINCE 
MONDAY.  ALSO C/O WEAKNESS, GOWNED AND PLACED ON MONITOR. AWAITING MD CHAN.

## 2020-06-18 ENCOUNTER — HOSPITAL ENCOUNTER (OUTPATIENT)
Dept: HOSPITAL 54 - WOU | Age: 35
Discharge: HOME | End: 2020-06-18
Attending: SURGERY
Payer: MEDICARE

## 2020-06-18 DIAGNOSIS — N65.0: ICD-10-CM

## 2020-06-18 DIAGNOSIS — K86.1: ICD-10-CM

## 2020-06-18 DIAGNOSIS — Z88.0: ICD-10-CM

## 2020-06-18 DIAGNOSIS — M35.9: ICD-10-CM

## 2020-06-18 DIAGNOSIS — A31.9: Primary | ICD-10-CM

## 2020-06-18 DIAGNOSIS — Z91.040: ICD-10-CM

## 2020-06-18 PROCEDURE — G0463 HOSPITAL OUTPT CLINIC VISIT: HCPCS

## 2020-06-23 ENCOUNTER — HOSPITAL ENCOUNTER (EMERGENCY)
Dept: HOSPITAL 54 - ER | Age: 35
Discharge: HOME | End: 2020-06-23
Payer: MEDICARE

## 2020-06-23 VITALS — DIASTOLIC BLOOD PRESSURE: 78 MMHG | SYSTOLIC BLOOD PRESSURE: 112 MMHG

## 2020-06-23 VITALS — HEIGHT: 65 IN | WEIGHT: 115 LBS | BODY MASS INDEX: 19.16 KG/M2

## 2020-06-23 DIAGNOSIS — Z98.890: ICD-10-CM

## 2020-06-23 DIAGNOSIS — Z79.899: ICD-10-CM

## 2020-06-23 DIAGNOSIS — G43.909: ICD-10-CM

## 2020-06-23 DIAGNOSIS — R10.30: ICD-10-CM

## 2020-06-23 DIAGNOSIS — Z88.6: ICD-10-CM

## 2020-06-23 DIAGNOSIS — K21.9: ICD-10-CM

## 2020-06-23 DIAGNOSIS — Z90.89: ICD-10-CM

## 2020-06-23 DIAGNOSIS — Z88.8: ICD-10-CM

## 2020-06-23 DIAGNOSIS — Z91.040: ICD-10-CM

## 2020-06-23 DIAGNOSIS — Z88.0: ICD-10-CM

## 2020-06-23 DIAGNOSIS — Z90.49: ICD-10-CM

## 2020-06-23 DIAGNOSIS — R14.0: Primary | ICD-10-CM

## 2020-06-23 DIAGNOSIS — G89.29: ICD-10-CM

## 2020-06-23 LAB
ALBUMIN SERPL BCP-MCNC: 4.2 G/DL (ref 3.4–5)
ALP SERPL-CCNC: 89 U/L (ref 46–116)
ALT SERPL W P-5'-P-CCNC: 42 U/L (ref 12–78)
AST SERPL W P-5'-P-CCNC: 22 U/L (ref 15–37)
BASOPHILS # BLD AUTO: 0 /CMM (ref 0–0.2)
BASOPHILS NFR BLD AUTO: 0.3 % (ref 0–2)
BILIRUB DIRECT SERPL-MCNC: 0.1 MG/DL (ref 0–0.2)
BILIRUB SERPL-MCNC: 0.1 MG/DL (ref 0.2–1)
BUN SERPL-MCNC: 19 MG/DL (ref 7–18)
CALCIUM SERPL-MCNC: 9.1 MG/DL (ref 8.5–10.1)
CHLORIDE SERPL-SCNC: 103 MMOL/L (ref 98–107)
CO2 SERPL-SCNC: 27 MMOL/L (ref 21–32)
CREAT SERPL-MCNC: 0.7 MG/DL (ref 0.6–1.3)
EOSINOPHIL NFR BLD AUTO: 1.3 % (ref 0–6)
GLUCOSE SERPL-MCNC: 100 MG/DL (ref 74–106)
HCT VFR BLD AUTO: 37 % (ref 33–45)
HGB BLD-MCNC: 12.1 G/DL (ref 11.5–14.8)
LIPASE SERPL-CCNC: 212 U/L (ref 73–393)
LYMPHOCYTES NFR BLD AUTO: 1.5 /CMM (ref 0.8–4.8)
LYMPHOCYTES NFR BLD AUTO: 22.9 % (ref 20–44)
MCHC RBC AUTO-ENTMCNC: 32 G/DL (ref 31–36)
MCV RBC AUTO: 89 FL (ref 82–100)
MONOCYTES NFR BLD AUTO: 0.4 /CMM (ref 0.1–1.3)
MONOCYTES NFR BLD AUTO: 5.8 % (ref 2–12)
NEUTROPHILS # BLD AUTO: 4.6 /CMM (ref 1.8–8.9)
NEUTROPHILS NFR BLD AUTO: 69.7 % (ref 43–81)
PH UR STRIP: 5 [PH] (ref 5–8)
PLATELET # BLD AUTO: 221 /CMM (ref 150–450)
POTASSIUM SERPL-SCNC: 4.2 MMOL/L (ref 3.5–5.1)
PROT SERPL-MCNC: 7.4 G/DL (ref 6.4–8.2)
RBC # BLD AUTO: 4.22 MIL/UL (ref 4–5.2)
SODIUM SERPL-SCNC: 137 MMOL/L (ref 136–145)
UROBILINOGEN UR STRIP-MCNC: 0.2 EU/DL
WBC #/AREA URNS HPF: (no result) /HPF (ref 0–3)
WBC NRBC COR # BLD AUTO: 6.6 K/UL (ref 4.3–11)

## 2020-06-23 PROCEDURE — 85025 COMPLETE CBC W/AUTO DIFF WBC: CPT

## 2020-06-23 PROCEDURE — 36415 COLL VENOUS BLD VENIPUNCTURE: CPT

## 2020-06-23 PROCEDURE — 84703 CHORIONIC GONADOTROPIN ASSAY: CPT

## 2020-06-23 PROCEDURE — 80076 HEPATIC FUNCTION PANEL: CPT

## 2020-06-23 PROCEDURE — 74021 RADEX ABDOMEN 3+ VIEWS: CPT

## 2020-06-23 PROCEDURE — 96374 THER/PROPH/DIAG INJ IV PUSH: CPT

## 2020-06-23 PROCEDURE — 83690 ASSAY OF LIPASE: CPT

## 2020-06-23 PROCEDURE — 80048 BASIC METABOLIC PNL TOTAL CA: CPT

## 2020-06-23 PROCEDURE — 81001 URINALYSIS AUTO W/SCOPE: CPT

## 2020-06-23 PROCEDURE — 99284 EMERGENCY DEPT VISIT MOD MDM: CPT

## 2020-06-23 NOTE — NUR
PT WAS REFUSING TO LEAVE. PT WANTS TO BE ADMITTED TO THE HOSPITAL FOR FURTHER 
TESTING. AS PER PT, SHE WAS JUST WITH HER PMD AND A GI SPECIALIST, WHO COULD 
NOT FIND ANYTHING. PT HAD A CT LAST WEEK AND IT WAS NEGATIVE FOR ANYTHING OTHER 
THAN POSSIBLE CONSTIPATION. XRAY TODAY SHOWED PT WAS SLIGHTLY CONSTIPATED. PT 
DENIES CONSTIPATION, BUT IS NOW C/O MIGRAINE HA ALONG WITH THE ABDOMINAL PAIN 
AND APPEARS AGGITATED. PT STATED THAT THE DR SHOULD DO MORE TESTING. PT WAS 
TOLD THAT THE ER WAS ONLY ABLE TO DO CERTAIN TESTING, OTHER TESTING WOULD 
REQUIRE HER TO F/U WITH HER PMD AND GI MD. PT WAS UPSET. PT REC'D A COPY OF ALL 
LABS AND IMAGING FINDINGS. PT WAS DISCHARGED AND ESCORTED OUT BY SECURITY.

## 2020-06-23 NOTE — NUR
PT PRESENTED TO THE ER WITH A C/O ABD PAIN AND PAIN/BURNING WITH URINATION. PT 
IS AFEBRILE. NO S/S OF FEVER, N/V OR FLU LIKE SYMPTOMS. PT AMBULATED TO THE 
BATHROOM WITH A STEADY GAIT. URINE SAMPLE IS OBTAINED AND SENT TO LAB.

## 2020-07-16 ENCOUNTER — HOSPITAL ENCOUNTER (OUTPATIENT)
Dept: HOSPITAL 54 - WOU | Age: 35
Discharge: HOME | End: 2020-07-16
Attending: SURGERY
Payer: MEDICARE

## 2020-07-16 DIAGNOSIS — N65.0: ICD-10-CM

## 2020-07-16 DIAGNOSIS — G72.41: Primary | ICD-10-CM

## 2020-07-16 DIAGNOSIS — K86.1: ICD-10-CM

## 2020-07-16 DIAGNOSIS — M35.9: ICD-10-CM

## 2020-07-16 PROCEDURE — G0463 HOSPITAL OUTPT CLINIC VISIT: HCPCS

## 2020-07-21 ENCOUNTER — HOSPITAL ENCOUNTER (EMERGENCY)
Dept: HOSPITAL 4 - SED | Age: 35
LOS: 1 days | Discharge: HOME | End: 2020-07-22
Payer: COMMERCIAL

## 2020-07-21 VITALS — BODY MASS INDEX: 19.99 KG/M2 | WEIGHT: 120 LBS | HEIGHT: 65 IN | SYSTOLIC BLOOD PRESSURE: 115 MMHG

## 2020-07-21 DIAGNOSIS — Z88.1: ICD-10-CM

## 2020-07-21 DIAGNOSIS — Z91.040: ICD-10-CM

## 2020-07-21 DIAGNOSIS — K21.9: ICD-10-CM

## 2020-07-21 DIAGNOSIS — Z88.2: ICD-10-CM

## 2020-07-21 DIAGNOSIS — R10.84: Primary | ICD-10-CM

## 2020-07-21 DIAGNOSIS — Z88.8: ICD-10-CM

## 2020-07-21 DIAGNOSIS — Z90.49: ICD-10-CM

## 2020-07-21 DIAGNOSIS — Z86.2: ICD-10-CM

## 2020-07-21 DIAGNOSIS — Z88.5: ICD-10-CM

## 2020-07-21 DIAGNOSIS — Z88.6: ICD-10-CM

## 2020-07-21 DIAGNOSIS — R11.10: ICD-10-CM

## 2020-07-21 DIAGNOSIS — Z88.0: ICD-10-CM

## 2020-07-21 LAB
ALBUMIN SERPL BCP-MCNC: 4 G/DL (ref 3.4–4.8)
ALT SERPL W P-5'-P-CCNC: 35 U/L (ref 12–78)
ANION GAP SERPL CALCULATED.3IONS-SCNC: 8 MMOL/L (ref 5–15)
APPEARANCE UR: (no result)
AST SERPL W P-5'-P-CCNC: 26 U/L (ref 10–37)
BACTERIA URNS QL MICRO: (no result) /HPF
BASOPHILS # BLD AUTO: 0 K/UL (ref 0–0.2)
BASOPHILS NFR BLD AUTO: 0.3 % (ref 0–2)
BILIRUB SERPL-MCNC: 0.2 MG/DL (ref 0–1)
BILIRUB UR QL STRIP: NEGATIVE
BUN SERPL-MCNC: 9 MG/DL (ref 8–21)
CALCIUM SERPL-MCNC: 9.3 MG/DL (ref 8.4–11)
CHLORIDE SERPL-SCNC: 105 MMOL/L (ref 98–107)
COLOR UR: (no result)
CREAT SERPL-MCNC: 0.72 MG/DL (ref 0.55–1.3)
EOSINOPHIL # BLD AUTO: 0.1 K/UL (ref 0–0.4)
EOSINOPHIL NFR BLD AUTO: 1.7 % (ref 0–4)
ERYTHROCYTE [DISTWIDTH] IN BLOOD BY AUTOMATED COUNT: 14.8 % (ref 9–15)
GFR SERPL CREATININE-BSD FRML MDRD: 118 ML/MIN (ref 90–?)
GLUCOSE SERPL-MCNC: 105 MG/DL (ref 70–99)
GLUCOSE UR STRIP-MCNC: NEGATIVE MG/DL
HCG SERPL-ACNC: 2 MIU/ML (ref 0–6)
HCT VFR BLD AUTO: 36.3 % (ref 36–48)
HGB BLD-MCNC: 11.9 G/DL (ref 12–16)
HGB UR QL STRIP: (no result)
INR PPP: 1.1 (ref 0.8–1.2)
KETONES UR STRIP-MCNC: NEGATIVE MG/DL
LEUKOCYTE ESTERASE UR QL STRIP: NEGATIVE
LIPASE SERPL-CCNC: 252 U/L (ref 73–393)
LYMPHOCYTES # BLD AUTO: 1.6 K/UL (ref 1–5.5)
LYMPHOCYTES NFR BLD AUTO: 37 % (ref 20.5–51.5)
MCH RBC QN AUTO: 29 PG (ref 27–31)
MCHC RBC AUTO-ENTMCNC: 33 % (ref 32–36)
MCV RBC AUTO: 89 FL (ref 79–98)
MONOCYTES # BLD MANUAL: 0.3 K/UL (ref 0–1)
MONOCYTES # BLD MANUAL: 7.1 % (ref 1.7–9.3)
MUCOUS THREADS URNS QL MICRO: (no result) /LPF
NEUTROPHILS # BLD AUTO: 2.3 K/UL (ref 1.8–7.7)
NEUTROPHILS NFR BLD AUTO: 53.9 % (ref 40–70)
NITRITE UR QL STRIP: NEGATIVE
PH UR STRIP: 7 [PH] (ref 5–8)
PLATELET # BLD AUTO: 251 K/UL (ref 130–430)
POTASSIUM SERPL-SCNC: 4.1 MMOL/L (ref 3.5–5.1)
PROT UR QL STRIP: NEGATIVE
PROTHROMBIN TIME: 10.9 SECS (ref 9.5–12.5)
RBC # BLD AUTO: 4.09 MIL/UL (ref 4.2–6.2)
RBC #/AREA URNS HPF: (no result) /HPF (ref 0–3)
SODIUM SERPLBLD-SCNC: 141 MMOL/L (ref 136–145)
SP GR UR STRIP: 1.01 (ref 1–1.03)
UROBILINOGEN UR STRIP-MCNC: 0.2 MG/DL (ref 0.2–1)
WBC # BLD AUTO: 4.3 K/UL (ref 4.8–10.8)
WBC #/AREA URNS HPF: (no result) /HPF (ref 0–3)

## 2020-07-21 PROCEDURE — 80053 COMPREHEN METABOLIC PANEL: CPT

## 2020-07-21 PROCEDURE — 85610 PROTHROMBIN TIME: CPT

## 2020-07-21 PROCEDURE — 96376 TX/PRO/DX INJ SAME DRUG ADON: CPT

## 2020-07-21 PROCEDURE — 96374 THER/PROPH/DIAG INJ IV PUSH: CPT

## 2020-07-21 PROCEDURE — 85730 THROMBOPLASTIN TIME PARTIAL: CPT

## 2020-07-21 PROCEDURE — 99284 EMERGENCY DEPT VISIT MOD MDM: CPT

## 2020-07-21 PROCEDURE — 96375 TX/PRO/DX INJ NEW DRUG ADDON: CPT

## 2020-07-21 PROCEDURE — 76830 TRANSVAGINAL US NON-OB: CPT

## 2020-07-21 PROCEDURE — 36415 COLL VENOUS BLD VENIPUNCTURE: CPT

## 2020-07-21 PROCEDURE — 82272 OCCULT BLD FECES 1-3 TESTS: CPT

## 2020-07-21 PROCEDURE — 85025 COMPLETE CBC W/AUTO DIFF WBC: CPT

## 2020-07-21 PROCEDURE — 84702 CHORIONIC GONADOTROPIN TEST: CPT

## 2020-07-21 PROCEDURE — 76857 US EXAM PELVIC LIMITED: CPT

## 2020-07-21 PROCEDURE — 96361 HYDRATE IV INFUSION ADD-ON: CPT

## 2020-07-21 PROCEDURE — 83690 ASSAY OF LIPASE: CPT

## 2020-07-21 PROCEDURE — 87086 URINE CULTURE/COLONY COUNT: CPT

## 2020-07-21 PROCEDURE — 81000 URINALYSIS NONAUTO W/SCOPE: CPT

## 2020-07-21 NOTE — NUR
PT AAO AND AMBULATORY C/O EPIGASTIC, ABDOMNIAL, AND RECTAL BLEEDING FOR PAST 
FIVE DAYS. PT ALSO REPORTS HEADACHE AND FATIGUE RECENTLY.

## 2020-07-22 VITALS — SYSTOLIC BLOOD PRESSURE: 120 MMHG

## 2020-07-22 NOTE — NUR
Patient given written and verbal discharge instructions and verbalizes 
understanding. DR. SVEN ALVAREZ MD discussed with patient the results and treatment 
provided. Patient in stable condition. ID arm band removed. 

Patient educated on pain management and to follow up with PMD. Pain Scale PT 
WILL NOT STATE. LEFT ERRATIC. Opportunity for questions provided and answered.

## 2020-07-25 ENCOUNTER — HOSPITAL ENCOUNTER (EMERGENCY)
Dept: HOSPITAL 54 - ER | Age: 35
Discharge: HOME | End: 2020-07-25
Payer: MEDICARE

## 2020-07-25 ENCOUNTER — HOSPITAL ENCOUNTER (INPATIENT)
Dept: HOSPITAL 87 - ER | Age: 35
LOS: 2 days | Discharge: HOME HEALTH | DRG: 439 | End: 2020-07-27
Attending: INTERNAL MEDICINE | Admitting: INTERNAL MEDICINE
Payer: MEDICARE

## 2020-07-25 VITALS
SYSTOLIC BLOOD PRESSURE: 110 MMHG | WEIGHT: 120 LBS | BODY MASS INDEX: 19.99 KG/M2 | DIASTOLIC BLOOD PRESSURE: 80 MMHG | HEIGHT: 65 IN

## 2020-07-25 VITALS — WEIGHT: 130 LBS | BODY MASS INDEX: 21.66 KG/M2 | HEIGHT: 65 IN

## 2020-07-25 DIAGNOSIS — F41.9: ICD-10-CM

## 2020-07-25 DIAGNOSIS — Z88.2: ICD-10-CM

## 2020-07-25 DIAGNOSIS — Z79.899: ICD-10-CM

## 2020-07-25 DIAGNOSIS — Z88.8: ICD-10-CM

## 2020-07-25 DIAGNOSIS — Z91.040: ICD-10-CM

## 2020-07-25 DIAGNOSIS — K51.911: ICD-10-CM

## 2020-07-25 DIAGNOSIS — Z76.5: ICD-10-CM

## 2020-07-25 DIAGNOSIS — F11.20: ICD-10-CM

## 2020-07-25 DIAGNOSIS — K21.9: ICD-10-CM

## 2020-07-25 DIAGNOSIS — Z98.82: ICD-10-CM

## 2020-07-25 DIAGNOSIS — E46: ICD-10-CM

## 2020-07-25 DIAGNOSIS — Z90.49: ICD-10-CM

## 2020-07-25 DIAGNOSIS — K57.90: ICD-10-CM

## 2020-07-25 DIAGNOSIS — K64.9: ICD-10-CM

## 2020-07-25 DIAGNOSIS — G89.29: Primary | ICD-10-CM

## 2020-07-25 DIAGNOSIS — Z96.89: ICD-10-CM

## 2020-07-25 DIAGNOSIS — R00.0: ICD-10-CM

## 2020-07-25 DIAGNOSIS — F68.10: ICD-10-CM

## 2020-07-25 DIAGNOSIS — Z90.89: ICD-10-CM

## 2020-07-25 DIAGNOSIS — K86.1: ICD-10-CM

## 2020-07-25 DIAGNOSIS — G43.909: ICD-10-CM

## 2020-07-25 DIAGNOSIS — R10.84: ICD-10-CM

## 2020-07-25 DIAGNOSIS — K22.0: ICD-10-CM

## 2020-07-25 DIAGNOSIS — K21.0: ICD-10-CM

## 2020-07-25 DIAGNOSIS — D89.89: ICD-10-CM

## 2020-07-25 DIAGNOSIS — Z87.11: ICD-10-CM

## 2020-07-25 DIAGNOSIS — K85.90: Primary | ICD-10-CM

## 2020-07-25 LAB
APPEARANCE UR: CLEAR
BASOPHILS NFR BLD AUTO: 0.5 % (ref 0–2)
COLOR UR: YELLOW
EOSINOPHIL NFR BLD AUTO: 4.4 % (ref 0–5)
ERYTHROCYTE [DISTWIDTH] IN BLOOD BY AUTOMATED COUNT: 14.5 % (ref 11.6–14.6)
HCT VFR BLD AUTO: 36.8 % (ref 36–48)
HGB BLD-MCNC: 12.4 G/DL (ref 12–16)
HGB UR QL STRIP: NEGATIVE
KETONES UR STRIP-MCNC: NEGATIVE MG/DL
LEUKOCYTE ESTERASE UR QL STRIP: NEGATIVE
LYMPHOCYTES NFR BLD AUTO: 24.2 % (ref 20–50)
MCH RBC QN AUTO: 29.1 PG (ref 28–32)
MCV RBC AUTO: 86.6 FL (ref 81–99)
MONOCYTES NFR BLD AUTO: 6.6 % (ref 2–8)
NEUTROPHILS NFR BLD AUTO: 64.3 % (ref 40–76)
NITRITE UR QL STRIP: NEGATIVE
PH UR STRIP: 8.5 [PH] (ref 4.5–8)
PH UR STRIP: 8.5 [PH] (ref 5–8)
PLATELET # BLD AUTO: 211 X1000/UL (ref 130–400)
PMV BLD AUTO: 7.4 FL (ref 7.4–10.4)
PROT UR QL STRIP: NEGATIVE
RBC # BLD AUTO: 4.25 MILL/UL (ref 4.2–5.4)
RBC #/AREA URNS HPF: (no result) /HPF (ref 0–2)
SP GR UR STRIP: 1.01 (ref 1–1.03)
UROBILINOGEN UR STRIP-MCNC: 0.2 E.U./DL (ref 0.2–1)
UROBILINOGEN UR STRIP-MCNC: 0.2 EU/DL
WBC #/AREA URNS HPF: (no result) /HPF (ref 0–3)

## 2020-07-25 PROCEDURE — 96374 THER/PROPH/DIAG INJ IV PUSH: CPT

## 2020-07-25 PROCEDURE — 80305 DRUG TEST PRSMV DIR OPT OBS: CPT

## 2020-07-25 PROCEDURE — 80320 DRUG SCREEN QUANTALCOHOLS: CPT

## 2020-07-25 PROCEDURE — G0480 DRUG TEST DEF 1-7 CLASSES: HCPCS

## 2020-07-25 PROCEDURE — 81003 URINALYSIS AUTO W/O SCOPE: CPT

## 2020-07-25 PROCEDURE — 93005 ELECTROCARDIOGRAM TRACING: CPT

## 2020-07-25 PROCEDURE — 36415 COLL VENOUS BLD VENIPUNCTURE: CPT

## 2020-07-25 PROCEDURE — 99285 EMERGENCY DEPT VISIT HI MDM: CPT

## 2020-07-25 PROCEDURE — 80053 COMPREHEN METABOLIC PANEL: CPT

## 2020-07-25 PROCEDURE — 84703 CHORIONIC GONADOTROPIN ASSAY: CPT

## 2020-07-25 PROCEDURE — 85025 COMPLETE CBC W/AUTO DIFF WBC: CPT

## 2020-07-25 PROCEDURE — 84484 ASSAY OF TROPONIN QUANT: CPT

## 2020-07-25 PROCEDURE — 74176 CT ABD & PELVIS W/O CONTRAST: CPT

## 2020-07-25 NOTE — NUR
PT ELOPED. PT REFUSED MEDS ORDERED BY JOSE ELIAS CORDOBA. PT STS " I'LL JUST GO TO 
ANOTHER HOSPITAL ". PT WALKED OUT OF ED, DR. FRANKLIN & JOSE ELIAS CORDOBA SPOKE TO PT & 
STILL LEFT ED.

## 2020-07-26 VITALS — DIASTOLIC BLOOD PRESSURE: 71 MMHG | SYSTOLIC BLOOD PRESSURE: 107 MMHG

## 2020-07-26 VITALS — DIASTOLIC BLOOD PRESSURE: 81 MMHG | SYSTOLIC BLOOD PRESSURE: 120 MMHG

## 2020-07-26 VITALS — SYSTOLIC BLOOD PRESSURE: 99 MMHG | DIASTOLIC BLOOD PRESSURE: 67 MMHG

## 2020-07-26 VITALS — SYSTOLIC BLOOD PRESSURE: 104 MMHG | DIASTOLIC BLOOD PRESSURE: 74 MMHG

## 2020-07-26 LAB
AMPHETAMINES UR QL SCN: NEGATIVE
AMPHETAMINES UR QL SCN: NEGATIVE
BARBITURATES UR QL SCN: NEGATIVE
BARBITURATES UR QL SCN: NEGATIVE
BENZODIAZ UR QL SCN: NEGATIVE
BENZODIAZ UR QL SCN: NEGATIVE
BZE UR QL SCN: NEGATIVE
BZE UR QL SCN: NEGATIVE
CANNABINOIDS UR QL SCN: NEGATIVE
CANNABINOIDS UR QL SCN: NEGATIVE
CHLORIDE SERPL-SCNC: 106 MEQ/L (ref 98–107)
ETHANOL SERPL-MCNC: < 10 MG/DL
HCG SERPL QL: NEGATIVE
METHADONE UR QL SCN: NEGATIVE
METHADONE UR QL SCN: NEGATIVE
OPIATES UR QL SCN: (no result)
OPIATES UR QL SCN: (no result)
PCP UR QL SCN: NEGATIVE
PCP UR QL SCN: NEGATIVE

## 2020-07-26 RX ADMIN — HYDROMORPHONE HYDROCHLORIDE PRN MG: 2 INJECTION, SOLUTION INTRAMUSCULAR; INTRAVENOUS; SUBCUTANEOUS at 20:59

## 2020-07-26 RX ADMIN — FAMOTIDINE SCH MG: 10 INJECTION INTRAVENOUS at 09:37

## 2020-07-26 RX ADMIN — FAMOTIDINE SCH MG: 10 INJECTION INTRAVENOUS at 20:42

## 2020-07-26 RX ADMIN — DEXTROSE SCH MLS/HR: 5 SOLUTION INTRAVENOUS at 05:18

## 2020-07-26 RX ADMIN — DIPHENHYDRAMINE HYDROCHLORIDE PRN MG: 50 INJECTION, SOLUTION INTRAMUSCULAR; INTRAVENOUS at 14:59

## 2020-07-26 RX ADMIN — DIPHENHYDRAMINE HYDROCHLORIDE PRN MG: 50 INJECTION, SOLUTION INTRAMUSCULAR; INTRAVENOUS at 22:29

## 2020-07-27 VITALS — SYSTOLIC BLOOD PRESSURE: 110 MMHG | DIASTOLIC BLOOD PRESSURE: 78 MMHG

## 2020-07-27 VITALS — DIASTOLIC BLOOD PRESSURE: 83 MMHG | SYSTOLIC BLOOD PRESSURE: 123 MMHG

## 2020-07-27 VITALS — DIASTOLIC BLOOD PRESSURE: 80 MMHG | SYSTOLIC BLOOD PRESSURE: 118 MMHG

## 2020-07-27 LAB
BASOPHILS NFR BLD AUTO: 0.5 % (ref 0–2)
CHLORIDE SERPL-SCNC: 108 MEQ/L (ref 98–107)
EOSINOPHIL NFR BLD AUTO: 4.8 % (ref 0–5)
ERYTHROCYTE [DISTWIDTH] IN BLOOD BY AUTOMATED COUNT: 14.2 % (ref 11.6–14.6)
HCT VFR BLD AUTO: 30.6 % (ref 36–48)
HGB BLD-MCNC: 10.4 G/DL (ref 12–16)
LYMPHOCYTES NFR BLD AUTO: 37.2 % (ref 20–50)
MCH RBC QN AUTO: 29.4 PG (ref 28–32)
MCV RBC AUTO: 86.7 FL (ref 81–99)
MONOCYTES NFR BLD AUTO: 9.2 % (ref 2–8)
NEUTROPHILS NFR BLD AUTO: 48.3 % (ref 40–76)
PLATELET # BLD AUTO: 194 X1000/UL (ref 130–400)
PMV BLD AUTO: 7.5 FL (ref 7.4–10.4)
RBC # BLD AUTO: 3.53 MILL/UL (ref 4.2–5.4)

## 2020-07-27 RX ADMIN — HYDROMORPHONE HYDROCHLORIDE PRN MG: 2 INJECTION, SOLUTION INTRAMUSCULAR; INTRAVENOUS; SUBCUTANEOUS at 05:47

## 2020-07-27 RX ADMIN — DIPHENHYDRAMINE HYDROCHLORIDE PRN MG: 50 INJECTION, SOLUTION INTRAMUSCULAR; INTRAVENOUS at 13:32

## 2020-07-27 RX ADMIN — FAMOTIDINE SCH MG: 10 INJECTION INTRAVENOUS at 09:12

## 2020-07-27 RX ADMIN — DEXTROSE SCH MLS/HR: 5 SOLUTION INTRAVENOUS at 00:00

## 2020-07-27 RX ADMIN — DIPHENHYDRAMINE HYDROCHLORIDE PRN MG: 50 INJECTION, SOLUTION INTRAMUSCULAR; INTRAVENOUS at 06:19

## 2020-08-30 ENCOUNTER — HOSPITAL ENCOUNTER (INPATIENT)
Dept: HOSPITAL 87 - ER | Age: 35
LOS: 1 days | Discharge: HOME | DRG: 392 | End: 2020-08-31
Attending: INTERNAL MEDICINE | Admitting: INTERNAL MEDICINE
Payer: MEDICARE

## 2020-08-30 VITALS — WEIGHT: 115 LBS | HEIGHT: 65 IN | BODY MASS INDEX: 19.16 KG/M2

## 2020-08-30 DIAGNOSIS — Z88.2: ICD-10-CM

## 2020-08-30 DIAGNOSIS — Z98.82: ICD-10-CM

## 2020-08-30 DIAGNOSIS — G43.909: ICD-10-CM

## 2020-08-30 DIAGNOSIS — M19.90: ICD-10-CM

## 2020-08-30 DIAGNOSIS — Z90.49: ICD-10-CM

## 2020-08-30 DIAGNOSIS — Z76.5: ICD-10-CM

## 2020-08-30 DIAGNOSIS — E87.8: ICD-10-CM

## 2020-08-30 DIAGNOSIS — Z79.899: ICD-10-CM

## 2020-08-30 DIAGNOSIS — Z88.5: ICD-10-CM

## 2020-08-30 DIAGNOSIS — Z88.6: ICD-10-CM

## 2020-08-30 DIAGNOSIS — K21.9: Primary | ICD-10-CM

## 2020-08-30 DIAGNOSIS — Z88.8: ICD-10-CM

## 2020-08-30 DIAGNOSIS — K86.1: ICD-10-CM

## 2020-08-30 DIAGNOSIS — Z91.040: ICD-10-CM

## 2020-08-30 LAB
APPEARANCE UR: (no result)
B-HCG SERPL-ACNC: < 1 MIU/ML (ref ?–3)
BASOPHILS NFR BLD AUTO: 0.7 % (ref 0–2)
CHLORIDE SERPL-SCNC: 108 MEQ/L (ref 98–107)
COLOR UR: YELLOW
EOSINOPHIL NFR BLD AUTO: 0.9 % (ref 0–5)
ERYTHROCYTE [DISTWIDTH] IN BLOOD BY AUTOMATED COUNT: 14.7 % (ref 11.6–14.6)
HCG SERPL QL: NEGATIVE
HCT VFR BLD AUTO: 35.5 % (ref 36–48)
HGB BLD-MCNC: 11.7 G/DL (ref 12–16)
HGB UR QL STRIP: (no result)
INR PPP: 1
KETONES UR STRIP-MCNC: NEGATIVE MG/DL
LEUKOCYTE ESTERASE UR QL STRIP: NEGATIVE
LYMPHOCYTES NFR BLD AUTO: 30.9 % (ref 20–50)
MCH RBC QN AUTO: 28.3 PG (ref 28–32)
MCV RBC AUTO: 85.6 FL (ref 81–99)
MONOCYTES NFR BLD AUTO: 7.4 % (ref 2–8)
NEUTROPHILS NFR BLD AUTO: 60.1 % (ref 40–76)
NITRITE UR QL STRIP: NEGATIVE
PH UR STRIP: 5.5 [PH] (ref 4.5–8)
PLATELET # BLD AUTO: 259 X1000/UL (ref 130–400)
PMV BLD AUTO: 7.6 FL (ref 7.4–10.4)
PROT UR QL STRIP: NEGATIVE
PROTHROMBIN TIME: 10.4 SEC (ref 9.6–11)
RBC # BLD AUTO: 4.15 MILL/UL (ref 4.2–5.4)
SP GR UR STRIP: 1.02 (ref 1–1.03)
UROBILINOGEN UR STRIP-MCNC: 0.2 E.U./DL (ref 0.2–1)

## 2020-08-30 PROCEDURE — 86870 RBC ANTIBODY IDENTIFICATION: CPT

## 2020-08-30 PROCEDURE — 76856 US EXAM PELVIC COMPLETE: CPT

## 2020-08-30 PROCEDURE — 74176 CT ABD & PELVIS W/O CONTRAST: CPT

## 2020-08-30 PROCEDURE — 85025 COMPLETE CBC W/AUTO DIFF WBC: CPT

## 2020-08-30 PROCEDURE — 84702 CHORIONIC GONADOTROPIN TEST: CPT

## 2020-08-30 PROCEDURE — 81003 URINALYSIS AUTO W/O SCOPE: CPT

## 2020-08-30 PROCEDURE — 76830 TRANSVAGINAL US NON-OB: CPT

## 2020-08-30 PROCEDURE — 96374 THER/PROPH/DIAG INJ IV PUSH: CPT

## 2020-08-30 PROCEDURE — 86850 RBC ANTIBODY SCREEN: CPT

## 2020-08-30 PROCEDURE — 36415 COLL VENOUS BLD VENIPUNCTURE: CPT

## 2020-08-30 PROCEDURE — 99285 EMERGENCY DEPT VISIT HI MDM: CPT

## 2020-08-30 PROCEDURE — 84703 CHORIONIC GONADOTROPIN ASSAY: CPT

## 2020-08-30 PROCEDURE — 80053 COMPREHEN METABOLIC PANEL: CPT

## 2020-08-30 PROCEDURE — 86900 BLOOD TYPING SEROLOGIC ABO: CPT

## 2020-08-31 ENCOUNTER — HOSPITAL ENCOUNTER (EMERGENCY)
Dept: HOSPITAL 87 - ER | Age: 35
Discharge: LEFT BEFORE DISCHARGE | End: 2020-08-31
Payer: MEDICARE

## 2020-08-31 VITALS — DIASTOLIC BLOOD PRESSURE: 51 MMHG | SYSTOLIC BLOOD PRESSURE: 87 MMHG

## 2020-08-31 VITALS — DIASTOLIC BLOOD PRESSURE: 77 MMHG | SYSTOLIC BLOOD PRESSURE: 121 MMHG

## 2020-08-31 VITALS — SYSTOLIC BLOOD PRESSURE: 105 MMHG | DIASTOLIC BLOOD PRESSURE: 93 MMHG

## 2020-08-31 VITALS — DIASTOLIC BLOOD PRESSURE: 66 MMHG | SYSTOLIC BLOOD PRESSURE: 107 MMHG

## 2020-08-31 VITALS — SYSTOLIC BLOOD PRESSURE: 117 MMHG | DIASTOLIC BLOOD PRESSURE: 60 MMHG

## 2020-08-31 VITALS — SYSTOLIC BLOOD PRESSURE: 107 MMHG | DIASTOLIC BLOOD PRESSURE: 66 MMHG

## 2020-08-31 VITALS — HEIGHT: 65 IN | BODY MASS INDEX: 19.1 KG/M2 | WEIGHT: 114.64 LBS

## 2020-08-31 VITALS — SYSTOLIC BLOOD PRESSURE: 103 MMHG | DIASTOLIC BLOOD PRESSURE: 67 MMHG

## 2020-08-31 DIAGNOSIS — Z88.2: ICD-10-CM

## 2020-08-31 DIAGNOSIS — R10.13: Primary | ICD-10-CM

## 2020-08-31 DIAGNOSIS — F91.8: ICD-10-CM

## 2020-08-31 DIAGNOSIS — Z90.49: ICD-10-CM

## 2020-08-31 DIAGNOSIS — Z88.8: ICD-10-CM

## 2020-08-31 DIAGNOSIS — Z88.5: ICD-10-CM

## 2020-08-31 DIAGNOSIS — Z91.040: ICD-10-CM

## 2020-08-31 DIAGNOSIS — K86.1: ICD-10-CM

## 2020-08-31 DIAGNOSIS — F41.9: ICD-10-CM

## 2020-08-31 DIAGNOSIS — M32.9: ICD-10-CM

## 2020-08-31 DIAGNOSIS — Z88.6: ICD-10-CM

## 2020-08-31 PROCEDURE — 99283 EMERGENCY DEPT VISIT LOW MDM: CPT

## 2020-08-31 PROCEDURE — 93005 ELECTROCARDIOGRAM TRACING: CPT

## 2020-08-31 RX ADMIN — HYDROMORPHONE HYDROCHLORIDE PRN MG: 2 INJECTION, SOLUTION INTRAMUSCULAR; INTRAVENOUS; SUBCUTANEOUS at 01:30

## 2020-08-31 RX ADMIN — DEXTROSE, SODIUM CHLORIDE, AND POTASSIUM CHLORIDE SCH MLS/HR: 5; .45; .075 INJECTION INTRAVENOUS at 03:45

## 2020-08-31 RX ADMIN — FAMOTIDINE SCH MG: 10 INJECTION INTRAVENOUS at 10:29

## 2020-08-31 RX ADMIN — HYDROMORPHONE HYDROCHLORIDE PRN MG: 2 INJECTION, SOLUTION INTRAMUSCULAR; INTRAVENOUS; SUBCUTANEOUS at 06:53

## 2020-08-31 RX ADMIN — DEXTROSE, SODIUM CHLORIDE, AND POTASSIUM CHLORIDE SCH MLS/HR: 5; .45; .075 INJECTION INTRAVENOUS at 13:00

## 2020-08-31 RX ADMIN — FAMOTIDINE SCH MG: 10 INJECTION INTRAVENOUS at 01:29

## 2020-11-18 ENCOUNTER — HOSPITAL ENCOUNTER (EMERGENCY)
Dept: HOSPITAL 1 - ED | Age: 35
Discharge: HOME | End: 2020-11-18
Payer: COMMERCIAL

## 2020-11-18 VITALS
BODY MASS INDEX: 20.83 KG/M2 | HEIGHT: 65 IN | WEIGHT: 125 LBS | HEIGHT: 65 IN | BODY MASS INDEX: 20.83 KG/M2 | WEIGHT: 125 LBS

## 2020-11-18 VITALS — SYSTOLIC BLOOD PRESSURE: 107 MMHG | DIASTOLIC BLOOD PRESSURE: 73 MMHG

## 2020-11-18 DIAGNOSIS — Z88.5: ICD-10-CM

## 2020-11-18 DIAGNOSIS — K59.00: Primary | ICD-10-CM

## 2020-11-18 DIAGNOSIS — G43.909: ICD-10-CM

## 2020-11-18 DIAGNOSIS — Z86.2: ICD-10-CM

## 2020-11-18 DIAGNOSIS — Z88.2: ICD-10-CM

## 2020-11-18 DIAGNOSIS — Z88.8: ICD-10-CM

## 2020-11-18 LAB
ALBUMIN SERPL-MCNC: 3.5 G/DL (ref 3.4–5)
ALP SERPL-CCNC: 87 U/L (ref 46–116)
ALT SERPL-CCNC: 22 U/L (ref 14–59)
AMYLASE SERPL-CCNC: 91 U/L (ref 25–115)
AST SERPL-CCNC: 15 U/L (ref 15–37)
BASOPHILS NFR BLD: 0.3 % (ref 0–2)
BILIRUB SERPL-MCNC: 0.19 MG/DL (ref 0.2–1)
BUN SERPL-MCNC: 18 MG/DL (ref 7–18)
CALCIUM SERPL-MCNC: 8.3 MG/DL (ref 8.5–10.1)
CHLORIDE SERPL-SCNC: 106 MMOL/L (ref 98–107)
CO2 SERPL-SCNC: 29.4 MMOL/L (ref 21–32)
CREAT SERPL-MCNC: 0.7 MG/DL (ref 0.6–1)
ERYTHROCYTE [DISTWIDTH] IN BLOOD BY AUTOMATED COUNT: 16.8 % (ref 11.5–14.5)
GFR SERPLBLD BASED ON 1.73 SQ M-ARVRAT: > 60 ML/MIN
GLUCOSE SERPL-MCNC: 95 MG/DL (ref 74–106)
LIPASE SERPL-CCNC: 320 IU/L (ref 73–393)
PLATELET # BLD: 211 X10^3MCL (ref 130–400)
POTASSIUM SERPL-SCNC: 3.7 MMOL/L (ref 3.5–5.1)
PROT SERPL-MCNC: 6.2 G/DL (ref 6.4–8.2)
SODIUM SERPL-SCNC: 141 MMOL/L (ref 136–145)

## 2020-11-23 ENCOUNTER — HOSPITAL ENCOUNTER (EMERGENCY)
Dept: HOSPITAL 1 - ED | Age: 35
Discharge: HOME | End: 2020-11-23
Payer: COMMERCIAL

## 2020-11-23 VITALS — SYSTOLIC BLOOD PRESSURE: 113 MMHG | DIASTOLIC BLOOD PRESSURE: 81 MMHG

## 2020-11-23 VITALS
HEIGHT: 65 IN | WEIGHT: 125 LBS | BODY MASS INDEX: 20.83 KG/M2 | HEIGHT: 65 IN | WEIGHT: 125 LBS | BODY MASS INDEX: 20.83 KG/M2

## 2020-11-23 DIAGNOSIS — Z91.040: ICD-10-CM

## 2020-11-23 DIAGNOSIS — Z88.5: ICD-10-CM

## 2020-11-23 DIAGNOSIS — G43.909: ICD-10-CM

## 2020-11-23 DIAGNOSIS — K86.1: Primary | ICD-10-CM

## 2020-11-23 DIAGNOSIS — Z88.2: ICD-10-CM

## 2020-11-23 DIAGNOSIS — L93.0: ICD-10-CM

## 2020-11-23 DIAGNOSIS — Z86.2: ICD-10-CM

## 2020-11-23 DIAGNOSIS — Z88.8: ICD-10-CM

## 2020-11-23 LAB
ALBUMIN SERPL-MCNC: 3.5 G/DL (ref 3.4–5)
ALP SERPL-CCNC: 90 U/L (ref 46–116)
ALT SERPL-CCNC: 27 U/L (ref 14–59)
AST SERPL-CCNC: 17 U/L (ref 15–37)
BASOPHILS NFR BLD: 0.1 % (ref 0–2)
BILIRUB SERPL-MCNC: 0.41 MG/DL (ref 0.2–1)
BUN SERPL-MCNC: 19 MG/DL (ref 7–18)
CALCIUM SERPL-MCNC: 9 MG/DL (ref 8.5–10.1)
CHLORIDE SERPL-SCNC: 105 MMOL/L (ref 98–107)
CO2 SERPL-SCNC: 28.8 MMOL/L (ref 21–32)
CREAT SERPL-MCNC: 0.8 MG/DL (ref 0.6–1)
ERYTHROCYTE [DISTWIDTH] IN BLOOD BY AUTOMATED COUNT: 16.4 % (ref 11.5–14.5)
GFR SERPLBLD BASED ON 1.73 SQ M-ARVRAT: > 60 ML/MIN
GLUCOSE SERPL-MCNC: 85 MG/DL (ref 74–106)
LIPASE SERPL-CCNC: 221 IU/L (ref 73–393)
PHOSPHATE SERPL-MCNC: 3.5 MG/DL (ref 2.5–4.9)
PLATELET # BLD: 230 X10^3MCL (ref 130–400)
POTASSIUM SERPL-SCNC: 3.3 MMOL/L (ref 3.5–5.1)
PROT SERPL-MCNC: 6.2 G/DL (ref 6.4–8.2)
SODIUM SERPL-SCNC: 141 MMOL/L (ref 136–145)

## 2020-11-23 PROCEDURE — C9113 INJ PANTOPRAZOLE SODIUM, VIA: HCPCS

## 2020-11-25 ENCOUNTER — HOSPITAL ENCOUNTER (EMERGENCY)
Dept: HOSPITAL 1 - ED | Age: 35
Discharge: HOME | End: 2020-11-25
Payer: COMMERCIAL

## 2020-11-25 VITALS — DIASTOLIC BLOOD PRESSURE: 81 MMHG | SYSTOLIC BLOOD PRESSURE: 111 MMHG

## 2020-11-25 VITALS — HEIGHT: 65 IN | WEIGHT: 136 LBS | BODY MASS INDEX: 22.66 KG/M2

## 2020-11-25 DIAGNOSIS — Z88.8: ICD-10-CM

## 2020-11-25 DIAGNOSIS — Z88.2: ICD-10-CM

## 2020-11-25 DIAGNOSIS — G43.909: ICD-10-CM

## 2020-11-25 DIAGNOSIS — Z91.040: ICD-10-CM

## 2020-11-25 DIAGNOSIS — Z88.5: ICD-10-CM

## 2020-11-25 DIAGNOSIS — K92.1: Primary | ICD-10-CM

## 2020-11-25 DIAGNOSIS — Z88.6: ICD-10-CM

## 2020-11-25 DIAGNOSIS — Z86.2: ICD-10-CM

## 2020-11-25 LAB
ALBUMIN SERPL-MCNC: 3.5 G/DL (ref 3.4–5)
ALP SERPL-CCNC: 89 U/L (ref 46–116)
ALT SERPL-CCNC: 22 U/L (ref 14–59)
AST SERPL-CCNC: 10 U/L (ref 15–37)
BASOPHILS NFR BLD: 0.6 % (ref 0–2)
BILIRUB SERPL-MCNC: 0.24 MG/DL (ref 0.2–1)
BUN SERPL-MCNC: 11 MG/DL (ref 7–18)
CALCIUM SERPL-MCNC: 9 MG/DL (ref 8.5–10.1)
CHLORIDE SERPL-SCNC: 105 MMOL/L (ref 98–107)
CO2 SERPL-SCNC: 28.7 MMOL/L (ref 21–32)
CREAT SERPL-MCNC: 0.7 MG/DL (ref 0.6–1)
CRP SERPL-MCNC: < 0.2 MG/DL (ref ?–0.9)
ERYTHROCYTE [DISTWIDTH] IN BLOOD BY AUTOMATED COUNT: 16.3 % (ref 11.5–14.5)
GFR SERPLBLD BASED ON 1.73 SQ M-ARVRAT: > 60 ML/MIN
GLUCOSE SERPL-MCNC: 105 MG/DL (ref 74–106)
LIPASE SERPL-CCNC: 165 IU/L (ref 73–393)
PLATELET # BLD: 238 X10^3MCL (ref 130–400)
POTASSIUM SERPL-SCNC: 3.8 MMOL/L (ref 3.5–5.1)
PROT SERPL-MCNC: 6.3 G/DL (ref 6.4–8.2)
SODIUM SERPL-SCNC: 139 MMOL/L (ref 136–145)

## 2020-12-04 ENCOUNTER — HOSPITAL ENCOUNTER (EMERGENCY)
Dept: HOSPITAL 1 - ED | Age: 35
Discharge: HOME | End: 2020-12-04
Payer: COMMERCIAL

## 2020-12-04 VITALS — WEIGHT: 136 LBS | BODY MASS INDEX: 22.66 KG/M2 | HEIGHT: 65 IN

## 2020-12-04 VITALS — SYSTOLIC BLOOD PRESSURE: 104 MMHG | DIASTOLIC BLOOD PRESSURE: 68 MMHG

## 2020-12-04 DIAGNOSIS — G89.29: ICD-10-CM

## 2020-12-04 DIAGNOSIS — G43.909: ICD-10-CM

## 2020-12-04 DIAGNOSIS — R19.7: ICD-10-CM

## 2020-12-04 DIAGNOSIS — Z88.5: ICD-10-CM

## 2020-12-04 DIAGNOSIS — R10.814: Primary | ICD-10-CM

## 2020-12-04 DIAGNOSIS — Z91.040: ICD-10-CM

## 2020-12-04 DIAGNOSIS — Z88.2: ICD-10-CM

## 2020-12-04 DIAGNOSIS — Z88.8: ICD-10-CM

## 2020-12-04 DIAGNOSIS — Z86.2: ICD-10-CM

## 2020-12-04 LAB
ALBUMIN SERPL-MCNC: 3.8 G/DL (ref 3.4–5)
ALP SERPL-CCNC: 82 U/L (ref 46–116)
ALT SERPL-CCNC: 42 U/L (ref 14–59)
AST SERPL-CCNC: 31 U/L (ref 15–37)
BASOPHILS NFR BLD: 0.3 % (ref 0–2)
BILIRUB SERPL-MCNC: 0.46 MG/DL (ref 0.2–1)
BUN SERPL-MCNC: 13 MG/DL (ref 7–18)
CALCIUM SERPL-MCNC: 8.9 MG/DL (ref 8.5–10.1)
CHLORIDE SERPL-SCNC: 108 MMOL/L (ref 98–107)
CO2 SERPL-SCNC: 23.5 MMOL/L (ref 21–32)
CREAT SERPL-MCNC: 0.7 MG/DL (ref 0.6–1)
ERYTHROCYTE [DISTWIDTH] IN BLOOD BY AUTOMATED COUNT: 16 % (ref 11.5–14.5)
GFR SERPLBLD BASED ON 1.73 SQ M-ARVRAT: > 60 ML/MIN
GLUCOSE SERPL-MCNC: 110 MG/DL (ref 74–106)
LIPASE SERPL-CCNC: 217 IU/L (ref 73–393)
PLATELET # BLD: 223 X10^3MCL (ref 130–400)
POTASSIUM SERPL-SCNC: 3.9 MMOL/L (ref 3.5–5.1)
PROT SERPL-MCNC: 6.8 G/DL (ref 6.4–8.2)
SODIUM SERPL-SCNC: 142 MMOL/L (ref 136–145)

## 2020-12-12 ENCOUNTER — HOSPITAL ENCOUNTER (EMERGENCY)
Dept: HOSPITAL 4 - SED | Age: 35
Discharge: LEFT BEFORE DISCHARGE | End: 2020-12-12
Payer: COMMERCIAL

## 2020-12-12 VITALS — BODY MASS INDEX: 23.92 KG/M2 | WEIGHT: 135 LBS | HEIGHT: 63 IN

## 2020-12-12 VITALS — SYSTOLIC BLOOD PRESSURE: 101 MMHG

## 2020-12-12 DIAGNOSIS — K21.9: ICD-10-CM

## 2020-12-12 DIAGNOSIS — Z88.8: ICD-10-CM

## 2020-12-12 DIAGNOSIS — R10.84: Primary | ICD-10-CM

## 2020-12-12 DIAGNOSIS — Z88.1: ICD-10-CM

## 2020-12-12 DIAGNOSIS — Z88.0: ICD-10-CM

## 2020-12-12 DIAGNOSIS — Z91.040: ICD-10-CM

## 2020-12-12 DIAGNOSIS — Z88.6: ICD-10-CM

## 2020-12-15 ENCOUNTER — HOSPITAL ENCOUNTER (EMERGENCY)
Dept: HOSPITAL 1 - ED | Age: 35
Discharge: HOME | End: 2020-12-15
Payer: COMMERCIAL

## 2020-12-15 VITALS
HEIGHT: 65 IN | HEIGHT: 65 IN | BODY MASS INDEX: 19.99 KG/M2 | BODY MASS INDEX: 19.99 KG/M2 | WEIGHT: 120 LBS | WEIGHT: 120 LBS

## 2020-12-15 VITALS — DIASTOLIC BLOOD PRESSURE: 74 MMHG | SYSTOLIC BLOOD PRESSURE: 101 MMHG

## 2020-12-15 DIAGNOSIS — M32.9: ICD-10-CM

## 2020-12-15 DIAGNOSIS — R10.84: Primary | ICD-10-CM

## 2020-12-15 DIAGNOSIS — R07.89: ICD-10-CM

## 2020-12-15 LAB
ALBUMIN SERPL-MCNC: 4.2 G/DL (ref 3.4–5)
ALP SERPL-CCNC: 83 U/L (ref 46–116)
ALT SERPL-CCNC: 51 U/L (ref 14–59)
AMPHETAMINES UR QL SCN: (no result)
AST SERPL-CCNC: 31 U/L (ref 15–37)
BASOPHILS NFR BLD: 1 % (ref 0.2–1.3)
BILIRUB SERPL-MCNC: 0.5 MG/DL (ref 0.2–1)
BUN SERPL-MCNC: 9 MG/DL (ref 7–18)
CALCIUM SERPL-MCNC: 8.9 MG/DL (ref 8.5–10.1)
CHLORIDE SERPL-SCNC: 102 MMOL/L (ref 98–107)
CHOLEST SERPL-MCNC: 159 MG/DL (ref ?–200)
CO2 SERPL-SCNC: 22.8 MMOL/L (ref 21–32)
CREAT SERPL-MCNC: 0.7 MG/DL (ref 0.6–1)
ERYTHROCYTE [DISTWIDTH] IN BLOOD BY AUTOMATED COUNT: 15.9 % (ref 12.3–17.7)
GFR SERPLBLD BASED ON 1.73 SQ M-ARVRAT: > 60 ML/MIN
GLUCOSE SERPL-MCNC: 95 MG/DL (ref 74–106)
HDLC SERPL-MCNC: 49 MG/DL (ref 40–60)
LIPASE SERPL-CCNC: 235 IU/L (ref 73–393)
MICROSCOPIC UR-IMP: NO
PLATELET # BLD: 195 X10^3MCL (ref 179–408)
POTASSIUM SERPL-SCNC: 3.4 MMOL/L (ref 3.5–5.1)
PROT SERPL-MCNC: 7.5 G/DL (ref 6.4–8.2)
RBC # UR STRIP.AUTO: NEGATIVE /UL
SODIUM SERPL-SCNC: 140 MMOL/L (ref 136–145)
UA SPECIFIC GRAVITY: >=1.03 (ref 1–1.03)

## 2021-01-12 ENCOUNTER — HOSPITAL ENCOUNTER (EMERGENCY)
Dept: HOSPITAL 87 - ER | Age: 36
Discharge: HOME | End: 2021-01-12
Payer: MEDICARE

## 2021-01-12 VITALS — DIASTOLIC BLOOD PRESSURE: 76 MMHG | SYSTOLIC BLOOD PRESSURE: 136 MMHG

## 2021-01-12 VITALS — HEIGHT: 67 IN | WEIGHT: 125.66 LBS | BODY MASS INDEX: 19.72 KG/M2

## 2021-01-12 DIAGNOSIS — Z88.2: ICD-10-CM

## 2021-01-12 DIAGNOSIS — Z79.899: ICD-10-CM

## 2021-01-12 DIAGNOSIS — Z88.0: ICD-10-CM

## 2021-01-12 DIAGNOSIS — Z88.6: ICD-10-CM

## 2021-01-12 DIAGNOSIS — D64.9: ICD-10-CM

## 2021-01-12 DIAGNOSIS — R10.9: Primary | ICD-10-CM

## 2021-01-12 DIAGNOSIS — Z90.49: ICD-10-CM

## 2021-01-12 LAB
APPEARANCE UR: CLEAR
BASOPHILS NFR BLD AUTO: 0.4 % (ref 0–2)
CHLORIDE SERPL-SCNC: 107 MEQ/L (ref 98–107)
COLOR UR: YELLOW
EOSINOPHIL NFR BLD AUTO: 1.4 % (ref 0–5)
ERYTHROCYTE [DISTWIDTH] IN BLOOD BY AUTOMATED COUNT: 14.7 % (ref 11.6–14.6)
HCT VFR BLD AUTO: 38.2 % (ref 36–48)
HGB BLD-MCNC: 13 G/DL (ref 12–16)
HGB UR QL STRIP: NEGATIVE
KETONES UR STRIP-MCNC: NEGATIVE MG/DL
LEUKOCYTE ESTERASE UR QL STRIP: NEGATIVE
LYMPHOCYTES NFR BLD AUTO: 23.1 % (ref 20–50)
MCH RBC QN AUTO: 31.6 PG (ref 28–32)
MCV RBC AUTO: 93.3 FL (ref 81–99)
MONOCYTES NFR BLD AUTO: 5.5 % (ref 2–8)
NEUTROPHILS NFR BLD AUTO: 69.6 % (ref 40–76)
NITRITE UR QL STRIP: NEGATIVE
PH UR STRIP: 7.5 [PH] (ref 4.5–8)
PLATELET # BLD AUTO: 174 X1000/UL (ref 130–400)
PMV BLD AUTO: 7.3 FL (ref 7.4–10.4)
PROT UR QL STRIP: NEGATIVE
RBC # BLD AUTO: 4.1 MILL/UL (ref 4.2–5.4)
SP GR UR STRIP: 1.01 (ref 1–1.03)
UROBILINOGEN UR STRIP-MCNC: 0.2 E.U./DL (ref 0.2–1)

## 2021-01-12 PROCEDURE — 81003 URINALYSIS AUTO W/O SCOPE: CPT

## 2021-01-12 PROCEDURE — 99285 EMERGENCY DEPT VISIT HI MDM: CPT

## 2021-01-12 PROCEDURE — 36415 COLL VENOUS BLD VENIPUNCTURE: CPT

## 2021-01-12 PROCEDURE — 93005 ELECTROCARDIOGRAM TRACING: CPT

## 2021-01-12 PROCEDURE — 80053 COMPREHEN METABOLIC PANEL: CPT

## 2021-01-12 PROCEDURE — 85025 COMPLETE CBC W/AUTO DIFF WBC: CPT

## 2021-01-12 PROCEDURE — 74176 CT ABD & PELVIS W/O CONTRAST: CPT

## 2021-01-12 PROCEDURE — 81025 URINE PREGNANCY TEST: CPT

## 2021-02-11 ENCOUNTER — HOSPITAL ENCOUNTER (INPATIENT)
Dept: HOSPITAL 1 - ED | Age: 36
LOS: 1 days | Discharge: LEFT BEFORE DISCHARGE | DRG: 761 | End: 2021-02-12
Attending: INTERNAL MEDICINE | Admitting: INTERNAL MEDICINE
Payer: COMMERCIAL

## 2021-02-11 VITALS
BODY MASS INDEX: 24.25 KG/M2 | WEIGHT: 145.56 LBS | HEIGHT: 65 IN | BODY MASS INDEX: 24.25 KG/M2 | WEIGHT: 145.56 LBS | HEIGHT: 65 IN

## 2021-02-11 VITALS — DIASTOLIC BLOOD PRESSURE: 62 MMHG | SYSTOLIC BLOOD PRESSURE: 97 MMHG

## 2021-02-11 DIAGNOSIS — Z88.6: ICD-10-CM

## 2021-02-11 DIAGNOSIS — G43.909: ICD-10-CM

## 2021-02-11 DIAGNOSIS — Z86.16: ICD-10-CM

## 2021-02-11 DIAGNOSIS — N83.209: Primary | ICD-10-CM

## 2021-02-11 DIAGNOSIS — Z90.49: ICD-10-CM

## 2021-02-11 DIAGNOSIS — R73.9: ICD-10-CM

## 2021-02-11 DIAGNOSIS — N80.1: ICD-10-CM

## 2021-02-11 DIAGNOSIS — Z88.2: ICD-10-CM

## 2021-02-11 DIAGNOSIS — M32.9: ICD-10-CM

## 2021-02-11 DIAGNOSIS — Z88.5: ICD-10-CM

## 2021-02-11 DIAGNOSIS — Z53.29: ICD-10-CM

## 2021-02-11 DIAGNOSIS — Z88.8: ICD-10-CM

## 2021-02-11 DIAGNOSIS — E83.51: ICD-10-CM

## 2021-02-11 LAB
ALBUMIN SERPL-MCNC: 3.7 G/DL (ref 3.4–5)
ALP SERPL-CCNC: 79 U/L (ref 46–116)
ALT SERPL-CCNC: 25 U/L (ref 14–59)
AST SERPL-CCNC: 13 U/L (ref 15–37)
BASOPHILS NFR BLD: 0.2 % (ref 0.2–1.3)
BILIRUB SERPL-MCNC: 0.3 MG/DL (ref 0.2–1)
BUN SERPL-MCNC: 17 MG/DL (ref 7–18)
CALCIUM SERPL-MCNC: 8.2 MG/DL (ref 8.5–10.1)
CHLORIDE SERPL-SCNC: 104 MMOL/L (ref 98–107)
CHOLEST SERPL-MCNC: 151 MG/DL (ref ?–200)
CHOLEST/HDLC SERPL: 2.2 MG/DL
CO2 SERPL-SCNC: 30.8 MMOL/L (ref 21–32)
CREAT SERPL-MCNC: 0.9 MG/DL (ref 0.6–1)
ERYTHROCYTE [DISTWIDTH] IN BLOOD BY AUTOMATED COUNT: 13.2 % (ref 12.3–17.7)
GFR SERPLBLD BASED ON 1.73 SQ M-ARVRAT: > 60 ML/MIN
GLUCOSE SERPL-MCNC: 122 MG/DL (ref 74–106)
HDLC SERPL-MCNC: 70 MG/DL (ref 40–60)
MICROSCOPIC UR-IMP: NO
PLATELET # BLD: 189 X10^3MCL (ref 179–408)
POTASSIUM SERPL-SCNC: 3.6 MMOL/L (ref 3.5–5.1)
PROT SERPL-MCNC: 6.2 G/DL (ref 6.4–8.2)
RBC # UR STRIP.AUTO: NEGATIVE /UL
SODIUM SERPL-SCNC: 138 MMOL/L (ref 136–145)
TRIGL SERPL-MCNC: 112 MG/DL (ref ?–150)
UA SPECIFIC GRAVITY: >=1.03 (ref 1–1.03)

## 2021-02-11 PROCEDURE — G0378 HOSPITAL OBSERVATION PER HR: HCPCS

## 2021-02-11 PROCEDURE — U0003 INFECTIOUS AGENT DETECTION BY NUCLEIC ACID (DNA OR RNA); SEVERE ACUTE RESPIRATORY SYNDROME CORONAVIRUS 2 (SARS-COV-2) (CORONAVIRUS DISEASE [COVID-19]), AMPLIFIED PROBE TECHNIQUE, MAKING USE OF HIGH THROUGHPUT TECHNOLOGIES AS DESCRIBED BY CMS-2020-01-R: HCPCS

## 2021-02-12 ENCOUNTER — HOSPITAL ENCOUNTER (EMERGENCY)
Dept: HOSPITAL 1 - ED | Age: 36
Discharge: HOME | End: 2021-02-12
Payer: COMMERCIAL

## 2021-02-12 VITALS — SYSTOLIC BLOOD PRESSURE: 118 MMHG | DIASTOLIC BLOOD PRESSURE: 82 MMHG

## 2021-02-12 VITALS — DIASTOLIC BLOOD PRESSURE: 60 MMHG | SYSTOLIC BLOOD PRESSURE: 103 MMHG

## 2021-02-12 VITALS — SYSTOLIC BLOOD PRESSURE: 108 MMHG | DIASTOLIC BLOOD PRESSURE: 71 MMHG

## 2021-02-12 VITALS
HEIGHT: 65 IN | BODY MASS INDEX: 22.49 KG/M2 | BODY MASS INDEX: 22.49 KG/M2 | HEIGHT: 65 IN | WEIGHT: 135 LBS | WEIGHT: 135 LBS

## 2021-02-12 VITALS — SYSTOLIC BLOOD PRESSURE: 114 MMHG | DIASTOLIC BLOOD PRESSURE: 81 MMHG

## 2021-02-12 DIAGNOSIS — Z86.2: ICD-10-CM

## 2021-02-12 DIAGNOSIS — G43.909: ICD-10-CM

## 2021-02-12 DIAGNOSIS — R10.2: Primary | ICD-10-CM

## 2021-02-12 DIAGNOSIS — Z88.8: ICD-10-CM

## 2021-02-12 DIAGNOSIS — Z88.5: ICD-10-CM

## 2021-02-12 DIAGNOSIS — Z91.040: ICD-10-CM

## 2021-02-12 DIAGNOSIS — Z88.2: ICD-10-CM

## 2021-02-12 LAB
BASOPHILS NFR BLD: 0.1 % (ref 0.2–1.3)
BUN SERPL-MCNC: 16 MG/DL (ref 7–18)
CALCIUM SERPL-MCNC: 8.8 MG/DL (ref 8.5–10.1)
CHLORIDE SERPL-SCNC: 107 MMOL/L (ref 98–107)
CO2 SERPL-SCNC: 29.8 MMOL/L (ref 21–32)
CREAT SERPL-MCNC: 0.8 MG/DL (ref 0.6–1)
ERYTHROCYTE [DISTWIDTH] IN BLOOD BY AUTOMATED COUNT: 13.5 % (ref 12.3–17.7)
GFR SERPLBLD BASED ON 1.73 SQ M-ARVRAT: > 60 ML/MIN
GLUCOSE SERPL-MCNC: 96 MG/DL (ref 74–106)
MAGNESIUM SERPL-MCNC: 2.1 MG/DL (ref 1.8–2.4)
PHOSPHATE SERPL-MCNC: 3 MG/DL (ref 2.5–4.9)
PLATELET # BLD: 182 X10^3MCL (ref 179–408)
POTASSIUM SERPL-SCNC: 3.6 MMOL/L (ref 3.5–5.1)
SODIUM SERPL-SCNC: 142 MMOL/L (ref 136–145)

## 2021-03-09 ENCOUNTER — HOSPITAL ENCOUNTER (EMERGENCY)
Dept: HOSPITAL 4 - SED | Age: 36
Discharge: HOME | End: 2021-03-09
Payer: COMMERCIAL

## 2021-03-09 VITALS — SYSTOLIC BLOOD PRESSURE: 111 MMHG

## 2021-03-09 VITALS — SYSTOLIC BLOOD PRESSURE: 114 MMHG

## 2021-03-09 VITALS — WEIGHT: 130 LBS | HEIGHT: 65 IN | BODY MASS INDEX: 21.66 KG/M2

## 2021-03-09 DIAGNOSIS — K21.9: ICD-10-CM

## 2021-03-09 DIAGNOSIS — R51.9: Primary | ICD-10-CM

## 2021-03-09 DIAGNOSIS — Z88.1: ICD-10-CM

## 2021-03-09 DIAGNOSIS — Z88.6: ICD-10-CM

## 2021-03-09 DIAGNOSIS — Z91.040: ICD-10-CM

## 2021-03-09 DIAGNOSIS — Z88.2: ICD-10-CM

## 2021-03-09 PROCEDURE — 99284 EMERGENCY DEPT VISIT MOD MDM: CPT

## 2021-03-09 PROCEDURE — 96375 TX/PRO/DX INJ NEW DRUG ADDON: CPT

## 2021-03-09 PROCEDURE — 96365 THER/PROPH/DIAG IV INF INIT: CPT

## 2021-03-11 ENCOUNTER — HOSPITAL ENCOUNTER (EMERGENCY)
Dept: HOSPITAL 4 - SED | Age: 36
Discharge: HOME | End: 2021-03-11
Payer: COMMERCIAL

## 2021-03-11 VITALS — SYSTOLIC BLOOD PRESSURE: 128 MMHG

## 2021-03-11 VITALS — BODY MASS INDEX: 22.49 KG/M2 | SYSTOLIC BLOOD PRESSURE: 119 MMHG | HEIGHT: 65 IN | WEIGHT: 135 LBS

## 2021-03-11 DIAGNOSIS — Z88.2: ICD-10-CM

## 2021-03-11 DIAGNOSIS — Z86.2: ICD-10-CM

## 2021-03-11 DIAGNOSIS — K21.9: ICD-10-CM

## 2021-03-11 DIAGNOSIS — Z88.1: ICD-10-CM

## 2021-03-11 DIAGNOSIS — K86.1: ICD-10-CM

## 2021-03-11 DIAGNOSIS — Z88.0: ICD-10-CM

## 2021-03-11 DIAGNOSIS — G43.909: Primary | ICD-10-CM

## 2021-03-11 DIAGNOSIS — Z91.040: ICD-10-CM

## 2021-03-11 DIAGNOSIS — Z88.8: ICD-10-CM

## 2021-03-11 DIAGNOSIS — Z88.6: ICD-10-CM

## 2021-03-11 PROCEDURE — 96365 THER/PROPH/DIAG IV INF INIT: CPT

## 2021-03-11 PROCEDURE — 96375 TX/PRO/DX INJ NEW DRUG ADDON: CPT

## 2021-03-11 PROCEDURE — 99284 EMERGENCY DEPT VISIT MOD MDM: CPT

## 2021-03-13 ENCOUNTER — HOSPITAL ENCOUNTER (EMERGENCY)
Dept: HOSPITAL 1 - ED | Age: 36
Discharge: LEFT BEFORE DISCHARGE | End: 2021-03-13
Payer: COMMERCIAL

## 2021-03-13 ENCOUNTER — HOSPITAL ENCOUNTER (EMERGENCY)
Dept: HOSPITAL 26 - MED | Age: 36
LOS: 1 days | Discharge: HOME | End: 2021-03-14
Payer: COMMERCIAL

## 2021-03-13 VITALS — HEIGHT: 65 IN | WEIGHT: 144 LBS | BODY MASS INDEX: 23.99 KG/M2

## 2021-03-13 VITALS — DIASTOLIC BLOOD PRESSURE: 71 MMHG | SYSTOLIC BLOOD PRESSURE: 118 MMHG

## 2021-03-13 VITALS
WEIGHT: 143 LBS | HEIGHT: 65 IN | HEIGHT: 65 IN | BODY MASS INDEX: 23.82 KG/M2 | BODY MASS INDEX: 23.82 KG/M2 | WEIGHT: 143 LBS

## 2021-03-13 VITALS — SYSTOLIC BLOOD PRESSURE: 111 MMHG | DIASTOLIC BLOOD PRESSURE: 79 MMHG

## 2021-03-13 DIAGNOSIS — Z88.6: ICD-10-CM

## 2021-03-13 DIAGNOSIS — G43.909: Primary | ICD-10-CM

## 2021-03-13 DIAGNOSIS — E86.0: ICD-10-CM

## 2021-03-13 DIAGNOSIS — Z88.0: ICD-10-CM

## 2021-03-13 DIAGNOSIS — Z88.8: ICD-10-CM

## 2021-03-13 DIAGNOSIS — R10.9: ICD-10-CM

## 2021-03-13 DIAGNOSIS — Z88.2: ICD-10-CM

## 2021-03-13 DIAGNOSIS — K21.9: ICD-10-CM

## 2021-03-13 DIAGNOSIS — Z88.5: ICD-10-CM

## 2021-03-13 DIAGNOSIS — R51.9: Primary | ICD-10-CM

## 2021-03-13 DIAGNOSIS — Z91.040: ICD-10-CM

## 2021-03-13 LAB
APPEARANCE UR: (no result)
BILIRUB UR QL STRIP: NEGATIVE
COLOR UR: YELLOW
GLUCOSE UR STRIP-MCNC: NEGATIVE MG/DL
HGB UR QL STRIP: NEGATIVE
LEUKOCYTE ESTERASE UR QL STRIP: NEGATIVE
NITRITE UR QL STRIP: NEGATIVE
PH UR STRIP: 7 [PH] (ref 5–9)

## 2021-03-13 PROCEDURE — 81001 URINALYSIS AUTO W/SCOPE: CPT

## 2021-03-13 PROCEDURE — 99284 EMERGENCY DEPT VISIT MOD MDM: CPT

## 2021-03-13 PROCEDURE — 81025 URINE PREGNANCY TEST: CPT

## 2021-03-13 PROCEDURE — 96376 TX/PRO/DX INJ SAME DRUG ADON: CPT

## 2021-03-13 PROCEDURE — 87086 URINE CULTURE/COLONY COUNT: CPT

## 2021-03-13 PROCEDURE — 96375 TX/PRO/DX INJ NEW DRUG ADDON: CPT

## 2021-03-13 PROCEDURE — 96374 THER/PROPH/DIAG INJ IV PUSH: CPT

## 2021-03-13 PROCEDURE — 96361 HYDRATE IV INFUSION ADD-ON: CPT

## 2021-03-13 NOTE — NUR
Pt c/o migraine and abd pain since this morning. Denies N/V/D or urinary 
symptoms. Hx of lupus and dysphagia, has been getting TPN thru port in left 
upper chest x1 year, is NPO. A/Ox4, steady gait.

## 2021-03-14 VITALS — SYSTOLIC BLOOD PRESSURE: 122 MMHG | DIASTOLIC BLOOD PRESSURE: 78 MMHG

## 2021-03-14 LAB
RBC #/AREA URNS HPF: (no result) /HPF (ref 0–5)
WBC,URINE: (no result) /HPF (ref 0–5)

## 2021-04-18 ENCOUNTER — HOSPITAL ENCOUNTER (EMERGENCY)
Dept: HOSPITAL 87 - ER | Age: 36
Discharge: HOME | End: 2021-04-18
Payer: MEDICARE

## 2021-04-18 VITALS — BODY MASS INDEX: 21.67 KG/M2 | WEIGHT: 130.07 LBS | HEIGHT: 65 IN

## 2021-04-18 VITALS — DIASTOLIC BLOOD PRESSURE: 88 MMHG | SYSTOLIC BLOOD PRESSURE: 112 MMHG

## 2021-04-18 DIAGNOSIS — Z88.2: ICD-10-CM

## 2021-04-18 DIAGNOSIS — R10.9: Primary | ICD-10-CM

## 2021-04-18 DIAGNOSIS — D64.9: ICD-10-CM

## 2021-04-18 DIAGNOSIS — G43.909: ICD-10-CM

## 2021-04-18 DIAGNOSIS — Z90.49: ICD-10-CM

## 2021-04-18 DIAGNOSIS — Z88.0: ICD-10-CM

## 2021-04-18 DIAGNOSIS — Z88.6: ICD-10-CM

## 2021-04-18 DIAGNOSIS — Z79.899: ICD-10-CM

## 2021-04-18 DIAGNOSIS — E86.0: ICD-10-CM

## 2021-04-18 DIAGNOSIS — Z98.890: ICD-10-CM

## 2021-04-18 LAB
APPEARANCE UR: (no result)
BASOPHILS NFR BLD AUTO: 0.4 % (ref 0–2)
CHLORIDE SERPL-SCNC: 109 MEQ/L (ref 98–107)
COLOR UR: YELLOW
EOSINOPHIL NFR BLD AUTO: 4 % (ref 0–5)
ERYTHROCYTE [DISTWIDTH] IN BLOOD BY AUTOMATED COUNT: 13 % (ref 11.6–14.6)
HCG SERPL QL: NEGATIVE
HCT VFR BLD AUTO: 33.9 % (ref 36–48)
HGB BLD-MCNC: 12 G/DL (ref 12–16)
HGB UR QL STRIP: (no result)
INR PPP: 1
KETONES UR STRIP-MCNC: NEGATIVE MG/DL
LEUKOCYTE ESTERASE UR QL STRIP: NEGATIVE
LYMPHOCYTES NFR BLD AUTO: 31.6 % (ref 20–50)
MCH RBC QN AUTO: 32.6 PG (ref 28–32)
MCV RBC AUTO: 92 FL (ref 81–99)
MONOCYTES NFR BLD AUTO: 6.8 % (ref 2–8)
NEUTROPHILS NFR BLD AUTO: 57.2 % (ref 40–76)
NITRITE UR QL STRIP: NEGATIVE
PH UR STRIP: 7 [PH] (ref 4.5–8)
PLATELET # BLD AUTO: 243 X1000/UL (ref 130–400)
PMV BLD AUTO: 6.8 FL (ref 7.4–10.4)
PROT UR QL STRIP: NEGATIVE
PROTHROMBIN TIME: 10.3 SEC (ref 9.6–11)
RBC # BLD AUTO: 3.69 MILL/UL (ref 4.2–5.4)
SP GR UR STRIP: 1.03 (ref 1–1.03)
UROBILINOGEN UR STRIP-MCNC: 0.2 E.U./DL (ref 0.2–1)

## 2021-04-18 PROCEDURE — 84703 CHORIONIC GONADOTROPIN ASSAY: CPT

## 2021-04-18 PROCEDURE — 96361 HYDRATE IV INFUSION ADD-ON: CPT

## 2021-04-18 PROCEDURE — 99285 EMERGENCY DEPT VISIT HI MDM: CPT

## 2021-04-18 PROCEDURE — 83690 ASSAY OF LIPASE: CPT

## 2021-04-18 PROCEDURE — 96376 TX/PRO/DX INJ SAME DRUG ADON: CPT

## 2021-04-18 PROCEDURE — 81003 URINALYSIS AUTO W/O SCOPE: CPT

## 2021-04-18 PROCEDURE — 85025 COMPLETE CBC W/AUTO DIFF WBC: CPT

## 2021-04-18 PROCEDURE — 85610 PROTHROMBIN TIME: CPT

## 2021-04-18 PROCEDURE — 36415 COLL VENOUS BLD VENIPUNCTURE: CPT

## 2021-04-18 PROCEDURE — 96374 THER/PROPH/DIAG INJ IV PUSH: CPT

## 2021-04-18 PROCEDURE — 87040 BLOOD CULTURE FOR BACTERIA: CPT

## 2021-04-18 PROCEDURE — 83605 ASSAY OF LACTIC ACID: CPT

## 2021-04-18 PROCEDURE — 93005 ELECTROCARDIOGRAM TRACING: CPT

## 2021-04-18 PROCEDURE — 96375 TX/PRO/DX INJ NEW DRUG ADDON: CPT

## 2021-04-18 PROCEDURE — 74176 CT ABD & PELVIS W/O CONTRAST: CPT

## 2021-04-18 PROCEDURE — 80053 COMPREHEN METABOLIC PANEL: CPT

## 2021-04-18 PROCEDURE — 87086 URINE CULTURE/COLONY COUNT: CPT

## 2021-04-25 ENCOUNTER — HOSPITAL ENCOUNTER (EMERGENCY)
Dept: HOSPITAL 87 - ER | Age: 36
Discharge: HOME | End: 2021-04-25
Payer: MEDICARE

## 2021-04-25 VITALS — SYSTOLIC BLOOD PRESSURE: 104 MMHG | DIASTOLIC BLOOD PRESSURE: 72 MMHG

## 2021-04-25 VITALS — WEIGHT: 132.28 LBS | BODY MASS INDEX: 21.26 KG/M2 | HEIGHT: 66 IN

## 2021-04-25 DIAGNOSIS — Z79.899: ICD-10-CM

## 2021-04-25 DIAGNOSIS — Z88.0: ICD-10-CM

## 2021-04-25 DIAGNOSIS — N20.0: ICD-10-CM

## 2021-04-25 DIAGNOSIS — M32.9: ICD-10-CM

## 2021-04-25 DIAGNOSIS — K59.00: ICD-10-CM

## 2021-04-25 DIAGNOSIS — R10.13: Primary | ICD-10-CM

## 2021-04-25 DIAGNOSIS — Z90.49: ICD-10-CM

## 2021-04-25 DIAGNOSIS — Z91.040: ICD-10-CM

## 2021-04-25 DIAGNOSIS — R11.0: ICD-10-CM

## 2021-04-25 DIAGNOSIS — Z87.19: ICD-10-CM

## 2021-04-25 DIAGNOSIS — Z88.6: ICD-10-CM

## 2021-04-25 DIAGNOSIS — R00.0: ICD-10-CM

## 2021-04-25 DIAGNOSIS — Z88.2: ICD-10-CM

## 2021-04-25 LAB
BASOPHILS NFR BLD AUTO: 0.4 % (ref 0–2)
CHLORIDE SERPL-SCNC: 108 MEQ/L (ref 98–107)
EOSINOPHIL NFR BLD AUTO: 3 % (ref 0–5)
ERYTHROCYTE [DISTWIDTH] IN BLOOD BY AUTOMATED COUNT: 13 % (ref 11.6–14.6)
HCG SERPL QL: NEGATIVE
HCT VFR BLD AUTO: 36.4 % (ref 36–48)
HGB BLD-MCNC: 12.6 G/DL (ref 12–16)
INR PPP: 1
LYMPHOCYTES NFR BLD AUTO: 25 % (ref 20–50)
MCH RBC QN AUTO: 31.8 PG (ref 28–32)
MCV RBC AUTO: 92.2 FL (ref 81–99)
MONOCYTES NFR BLD AUTO: 7 % (ref 2–8)
NEUTROPHILS NFR BLD AUTO: 64.6 % (ref 40–76)
PLATELET # BLD AUTO: 226 X1000/UL (ref 130–400)
PMV BLD AUTO: 6.5 FL (ref 7.4–10.4)
PROTHROMBIN TIME: 10.8 SEC (ref 9.6–11)
RBC # BLD AUTO: 3.95 MILL/UL (ref 4.2–5.4)

## 2021-04-25 PROCEDURE — 83690 ASSAY OF LIPASE: CPT

## 2021-04-25 PROCEDURE — 36415 COLL VENOUS BLD VENIPUNCTURE: CPT

## 2021-04-25 PROCEDURE — 96375 TX/PRO/DX INJ NEW DRUG ADDON: CPT

## 2021-04-25 PROCEDURE — 99285 EMERGENCY DEPT VISIT HI MDM: CPT

## 2021-04-25 PROCEDURE — 96376 TX/PRO/DX INJ SAME DRUG ADON: CPT

## 2021-04-25 PROCEDURE — 84703 CHORIONIC GONADOTROPIN ASSAY: CPT

## 2021-04-25 PROCEDURE — 85025 COMPLETE CBC W/AUTO DIFF WBC: CPT

## 2021-04-25 PROCEDURE — 80053 COMPREHEN METABOLIC PANEL: CPT

## 2021-04-25 PROCEDURE — 74176 CT ABD & PELVIS W/O CONTRAST: CPT

## 2021-04-25 PROCEDURE — 93005 ELECTROCARDIOGRAM TRACING: CPT

## 2021-04-25 PROCEDURE — 96374 THER/PROPH/DIAG INJ IV PUSH: CPT

## 2021-04-25 PROCEDURE — 85610 PROTHROMBIN TIME: CPT

## 2021-04-25 PROCEDURE — 96361 HYDRATE IV INFUSION ADD-ON: CPT

## 2021-05-19 ENCOUNTER — HOSPITAL ENCOUNTER (EMERGENCY)
Dept: HOSPITAL 26 - MED | Age: 36
LOS: 1 days | Discharge: HOME | End: 2021-05-20
Payer: COMMERCIAL

## 2021-05-19 VITALS — WEIGHT: 135 LBS | HEIGHT: 65 IN | BODY MASS INDEX: 22.49 KG/M2

## 2021-05-19 VITALS — SYSTOLIC BLOOD PRESSURE: 108 MMHG | DIASTOLIC BLOOD PRESSURE: 73 MMHG

## 2021-05-19 DIAGNOSIS — Z91.040: ICD-10-CM

## 2021-05-19 DIAGNOSIS — G89.29: ICD-10-CM

## 2021-05-19 DIAGNOSIS — Z88.6: ICD-10-CM

## 2021-05-19 DIAGNOSIS — Z88.8: ICD-10-CM

## 2021-05-19 DIAGNOSIS — G43.909: Primary | ICD-10-CM

## 2021-05-19 DIAGNOSIS — Z88.0: ICD-10-CM

## 2021-05-19 DIAGNOSIS — Z79.899: ICD-10-CM

## 2021-05-19 DIAGNOSIS — R10.13: ICD-10-CM

## 2021-05-19 DIAGNOSIS — R11.2: ICD-10-CM

## 2021-05-19 LAB
APPEARANCE UR: CLEAR
BASOPHILS # BLD AUTO: 0 K/UL (ref 0–0.22)
BASOPHILS NFR BLD AUTO: 0.5 % (ref 0–2)
BILIRUB UR QL STRIP: NEGATIVE
COLOR UR: YELLOW
EOSINOPHIL # BLD AUTO: 0.2 K/UL (ref 0–0.4)
EOSINOPHIL NFR BLD AUTO: 4.5 % (ref 0–4)
ERYTHROCYTE [DISTWIDTH] IN BLOOD BY AUTOMATED COUNT: 13.3 % (ref 11.6–13.7)
GLUCOSE UR STRIP-MCNC: NEGATIVE MG/DL
HCT VFR BLD AUTO: 29 % (ref 36–48)
HGB BLD-MCNC: 9.8 G/DL (ref 12–16)
HGB UR QL STRIP: NEGATIVE
LEUKOCYTE ESTERASE UR QL STRIP: NEGATIVE
LYMPHOCYTES # BLD AUTO: 1.1 K/UL (ref 2.5–16.5)
LYMPHOCYTES NFR BLD AUTO: 33.7 % (ref 20.5–51.1)
MCH RBC QN AUTO: 32 PG (ref 27–31)
MCHC RBC AUTO-ENTMCNC: 34 G/DL (ref 33–37)
MCV RBC AUTO: 93.6 FL (ref 80–94)
MONOCYTES # BLD AUTO: 0.4 K/UL (ref 0.8–1)
MONOCYTES NFR BLD AUTO: 10.6 % (ref 1.7–9.3)
NEUTROPHILS # BLD AUTO: 1.7 K/UL (ref 1.8–7.7)
NEUTROPHILS NFR BLD AUTO: 50.7 % (ref 42.2–75.2)
NITRITE UR QL STRIP: NEGATIVE
PH UR STRIP: 6 [PH] (ref 5–9)
PLATELET # BLD AUTO: 176 K/UL (ref 140–450)
RBC # BLD AUTO: 3.09 MIL/UL (ref 4.2–5.4)
WBC # BLD AUTO: 3.4 K/UL (ref 4.8–10.8)

## 2021-05-19 PROCEDURE — 86140 C-REACTIVE PROTEIN: CPT

## 2021-05-19 PROCEDURE — 96365 THER/PROPH/DIAG IV INF INIT: CPT

## 2021-05-19 PROCEDURE — 96375 TX/PRO/DX INJ NEW DRUG ADDON: CPT

## 2021-05-19 PROCEDURE — 96374 THER/PROPH/DIAG INJ IV PUSH: CPT

## 2021-05-19 PROCEDURE — 85651 RBC SED RATE NONAUTOMATED: CPT

## 2021-05-19 PROCEDURE — 80053 COMPREHEN METABOLIC PANEL: CPT

## 2021-05-19 PROCEDURE — 85025 COMPLETE CBC W/AUTO DIFF WBC: CPT

## 2021-05-19 PROCEDURE — 96372 THER/PROPH/DIAG INJ SC/IM: CPT

## 2021-05-19 PROCEDURE — 99284 EMERGENCY DEPT VISIT MOD MDM: CPT

## 2021-05-19 PROCEDURE — 96376 TX/PRO/DX INJ SAME DRUG ADON: CPT

## 2021-05-19 PROCEDURE — 81003 URINALYSIS AUTO W/O SCOPE: CPT

## 2021-05-19 PROCEDURE — 96361 HYDRATE IV INFUSION ADD-ON: CPT

## 2021-05-19 PROCEDURE — 36415 COLL VENOUS BLD VENIPUNCTURE: CPT

## 2021-05-19 PROCEDURE — 83690 ASSAY OF LIPASE: CPT

## 2021-05-19 PROCEDURE — 81025 URINE PREGNANCY TEST: CPT

## 2021-05-19 NOTE — NUR
ARRIVED TO BEDSIDE WITH PATIENT ON THE GURNEY IN A POSITION OF COMFORT, ALERT 
AND ORINETED, TRACKING WITH EYES, GCS 15. PATIENT STATED HAVING A PMH OF 
CHRONIC PANCREATITIS, LUPUS, AND KIDNEY INSUFFICIENCY. STATES HAVING ALLERGIES 
TO PCN, COMPAZINE, IBUPROFEN, RAGALAN AND MACROVIA. PATIENT STATED TAKING 
MEDICATIONS OF TPN, IV ZOFRAN, AND PEPCID. VS STABLE, CONNECTED TO CONTINUOUS 
CARDIAC MONITORING, AND RESTING IN A POSITION OF COMFORT. C/O NAUSEA AND 
VOMITING. PAIN 10/10 IN BACK AND PELVIC AREA. NOTIFIED MD, WILL CONTINUE TO 
CLOSELY MONITOR AND FREQUENTLY ROUND.

## 2021-05-19 NOTE — NUR
received pt from triage nurse Norma and placed to bed 09. connected to vs 
monitor. pt currently a/o x 4 gcs 15 able to move all extremities freely. 



35 year old female with med hx of chronic pancreatitis, lupus, and renal 
insufficiency coming from home with c/o epigastric pain that radiates to lower 
back since last saturday. pt also states right pelvic pain that started 
tuesday. currently states + n/v. denies diarrhea/constipation. abdomen is soft 
and nontender. bowel sounds normoactive on all quads. denies sob/cough. denies 
headache/blurry vision. Has tunneled CVAD to left upper chest. flushed with 
brisk blood return and patent.

## 2021-05-20 VITALS — DIASTOLIC BLOOD PRESSURE: 74 MMHG | SYSTOLIC BLOOD PRESSURE: 114 MMHG

## 2021-05-20 LAB
ALBUMIN FLD-MCNC: 3.2 G/DL (ref 3.4–5)
ANION GAP SERPL CALCULATED.3IONS-SCNC: 11.9 MMOL/L (ref 8–16)
AST SERPL-CCNC: 19 U/L (ref 15–37)
BILIRUB SERPL-MCNC: 0.4 MG/DL (ref 0–1)
BUN SERPL-MCNC: 6 MG/DL (ref 7–18)
CHLORIDE SERPL-SCNC: 109 MMOL/L (ref 98–107)
CO2 SERPL-SCNC: 25.1 MMOL/L (ref 21–32)
CREAT SERPL-MCNC: 0.6 MG/DL (ref 0.6–1.3)
GFR SERPL CREATININE-BSD FRML MDRD: 145 ML/MIN (ref 90–?)
GLUCOSE SERPL-MCNC: 90 MG/DL (ref 74–106)
LIPASE SERPL-CCNC: 137 U/L (ref 73–393)
POTASSIUM SERPL-SCNC: 4 MMOL/L (ref 3.5–5.1)
SODIUM SERPL-SCNC: 142 MMOL/L (ref 136–145)

## 2021-05-20 RX ADMIN — FAMOTIDINE ONE MG: 10 INJECTION INTRAVENOUS at 01:51

## 2021-05-20 RX ADMIN — SODIUM CHLORIDE ONE MG: 9 INJECTION, SOLUTION INTRAVENOUS at 00:17

## 2021-05-20 RX ADMIN — SODIUM CHLORIDE ONE MLS/HR: 9 INJECTION, SOLUTION INTRAVENOUS at 00:02

## 2021-05-20 RX ADMIN — MORPHINE SULFATE ONE MG: 2 INJECTION, SOLUTION INTRAMUSCULAR; INTRAVENOUS at 00:11

## 2021-05-20 RX ADMIN — SODIUM CHLORIDE ONE MG: 9 INJECTION, SOLUTION INTRAVENOUS at 01:52

## 2021-05-20 NOTE — NUR
Patient discharged with v/s stable. Written and verbal after care instructions 
given and explained. 

Patient alert, oriented and verbalized understanding of instructions. 
Ambulatory with steady gait. All questions addressed prior to discharge. ID 
band removed. Patient advised to follow up with PMD. Rx of ZOFRAN given, 
patient refused RX, MD notified of refusal. Patient educated on indication of 
medication including possible reaction and side effects. Opportunity to ask 
questions provided and answered.

## 2021-05-20 NOTE — NUR
PATIENT NOT WILLING TO WAIT FOR FULL REASSESSMENT, PATIENT ALSO ASKING FOR 
MULTIPLE MEDICATIONS. MD NOTIFIED OF REQUESTS. MD SPOKE WITH PATIENT AT 
BEDSIDE.

## 2021-05-20 NOTE — NUR
WENT OVER DISCHARGE PAPERWORK WITH THE PATIENT, STATED SHE DID NOT WANT THE 
ORDERED ZOFRAN FROM DR. JIMENEZ. NOTIFIED MD.

## 2021-07-06 ENCOUNTER — HOSPITAL ENCOUNTER (EMERGENCY)
Dept: HOSPITAL 12 - ER | Age: 36
Discharge: HOME | End: 2021-07-06
Payer: MEDICARE

## 2021-07-06 VITALS — HEIGHT: 65 IN | BODY MASS INDEX: 19.16 KG/M2 | WEIGHT: 115 LBS

## 2021-07-06 DIAGNOSIS — Z88.0: ICD-10-CM

## 2021-07-06 DIAGNOSIS — Z88.2: ICD-10-CM

## 2021-07-06 DIAGNOSIS — Z88.5: ICD-10-CM

## 2021-07-06 DIAGNOSIS — R10.2: ICD-10-CM

## 2021-07-06 DIAGNOSIS — Z91.040: ICD-10-CM

## 2021-07-06 DIAGNOSIS — Z90.49: ICD-10-CM

## 2021-07-06 DIAGNOSIS — Z88.8: ICD-10-CM

## 2021-07-06 DIAGNOSIS — Z91.013: ICD-10-CM

## 2021-07-06 DIAGNOSIS — R10.31: Primary | ICD-10-CM

## 2021-07-06 LAB
ALP SERPL-CCNC: 103 U/L (ref 50–136)
ALT SERPL W/O P-5'-P-CCNC: 33 U/L (ref 14–59)
APPEARANCE UR: CLEAR
AST SERPL-CCNC: 15 U/L (ref 15–37)
BILIRUB DIRECT SERPL-MCNC: 0.1 MG/DL (ref 0–0.2)
BILIRUB SERPL-MCNC: 0.3 MG/DL (ref 0.2–1)
BILIRUB UR QL STRIP: NEGATIVE
BUN SERPL-MCNC: 10 MG/DL (ref 7–18)
CHLORIDE SERPL-SCNC: 104 MMOL/L (ref 98–107)
CO2 SERPL-SCNC: 26 MMOL/L (ref 21–32)
COLOR UR: (no result)
CREAT SERPL-MCNC: 0.7 MG/DL (ref 0.6–1.3)
GLUCOSE SERPL-MCNC: 95 MG/DL (ref 74–106)
GLUCOSE UR STRIP-MCNC: NEGATIVE MG/DL
HCG UR QL: NEGATIVE
HCT VFR BLD AUTO: 35.7 % (ref 31.2–41.9)
HGB UR QL STRIP: NEGATIVE
KETONES UR STRIP-MCNC: NEGATIVE MG/DL
LEUKOCYTE ESTERASE UR QL STRIP: NEGATIVE
LIPASE SERPL-CCNC: 185 U/L (ref 73–393)
MCH RBC QN AUTO: 30.2 UUG (ref 24.7–32.8)
MCV RBC AUTO: 90.2 FL (ref 75.5–95.3)
NITRITE UR QL STRIP: NEGATIVE
PH UR STRIP: 7 [PH] (ref 5–8)
PLATELET # BLD AUTO: 245 K/UL (ref 179–408)
POTASSIUM SERPL-SCNC: 4 MMOL/L (ref 3.5–5.1)
SP GR UR STRIP: 1.02 (ref 1–1.03)
UROBILINOGEN UR STRIP-MCNC: 0.2 E.U./DL
WS STN SPEC: 7.2 G/DL (ref 6.4–8.2)

## 2021-07-06 PROCEDURE — 80048 BASIC METABOLIC PNL TOTAL CA: CPT

## 2021-07-06 PROCEDURE — 76856 US EXAM PELVIC COMPLETE: CPT

## 2021-07-06 PROCEDURE — 84702 CHORIONIC GONADOTROPIN TEST: CPT

## 2021-07-06 PROCEDURE — 96376 TX/PRO/DX INJ SAME DRUG ADON: CPT

## 2021-07-06 PROCEDURE — 81003 URINALYSIS AUTO W/O SCOPE: CPT

## 2021-07-06 PROCEDURE — 80076 HEPATIC FUNCTION PANEL: CPT

## 2021-07-06 PROCEDURE — 99285 EMERGENCY DEPT VISIT HI MDM: CPT

## 2021-07-06 PROCEDURE — 85025 COMPLETE CBC W/AUTO DIFF WBC: CPT

## 2021-07-06 PROCEDURE — 96372 THER/PROPH/DIAG INJ SC/IM: CPT

## 2021-07-06 PROCEDURE — 76770 US EXAM ABDO BACK WALL COMP: CPT

## 2021-07-06 PROCEDURE — 36415 COLL VENOUS BLD VENIPUNCTURE: CPT

## 2021-07-06 PROCEDURE — 96374 THER/PROPH/DIAG INJ IV PUSH: CPT

## 2021-07-06 PROCEDURE — 83690 ASSAY OF LIPASE: CPT

## 2021-07-06 PROCEDURE — 84703 CHORIONIC GONADOTROPIN ASSAY: CPT

## 2021-07-06 NOTE — NUR
Called [facility] transport service ( [phone]) for ride home per patient 
request, spoke with Ashu, reference#[id number], no eta given.

## 2021-07-06 NOTE — NUR
Pt appears comfortable, pt seen using her cell phone while lying in the gurney. 
Pt requesting Benadryl for itching,  notified.

## 2022-10-21 ENCOUNTER — HOSPITAL ENCOUNTER (EMERGENCY)
Dept: HOSPITAL 54 - ER | Age: 37
Discharge: HOME | End: 2022-10-21
Payer: MEDICARE

## 2022-10-21 VITALS — DIASTOLIC BLOOD PRESSURE: 75 MMHG | SYSTOLIC BLOOD PRESSURE: 105 MMHG

## 2022-10-21 VITALS — HEIGHT: 65 IN | WEIGHT: 132 LBS | BODY MASS INDEX: 21.99 KG/M2

## 2022-10-21 DIAGNOSIS — Z88.8: ICD-10-CM

## 2022-10-21 DIAGNOSIS — Z98.890: ICD-10-CM

## 2022-10-21 DIAGNOSIS — Z88.0: ICD-10-CM

## 2022-10-21 DIAGNOSIS — M32.9: Primary | ICD-10-CM

## 2022-10-21 DIAGNOSIS — Z88.6: ICD-10-CM

## 2022-10-21 LAB
ALBUMIN SERPL BCP-MCNC: 3.7 G/DL (ref 3.4–5)
ALP SERPL-CCNC: 104 U/L (ref 46–116)
ALT SERPL W P-5'-P-CCNC: 21 U/L (ref 12–78)
AST SERPL W P-5'-P-CCNC: 19 U/L (ref 15–37)
BASOPHILS # BLD AUTO: 0 K/UL (ref 0–0.2)
BASOPHILS NFR BLD AUTO: 0.4 % (ref 0–2)
BILIRUB DIRECT SERPL-MCNC: 0.1 MG/DL (ref 0–0.2)
BILIRUB SERPL-MCNC: 0.2 MG/DL (ref 0.2–1)
BILIRUB UR QL STRIP: NEGATIVE
BUN SERPL-MCNC: 8 MG/DL (ref 7–18)
CALCIUM SERPL-MCNC: 8.7 MG/DL (ref 8.5–10.1)
CHLORIDE SERPL-SCNC: 109 MMOL/L (ref 98–107)
CO2 SERPL-SCNC: 29 MMOL/L (ref 21–32)
COLOR UR: YELLOW
CREAT SERPL-MCNC: 0.6 MG/DL (ref 0.6–1.3)
EOSINOPHIL NFR BLD AUTO: 3.3 % (ref 0–6)
GLUCOSE SERPL-MCNC: 98 MG/DL (ref 74–106)
GLUCOSE UR STRIP-MCNC: NEGATIVE MG/DL
HCT VFR BLD AUTO: 34 % (ref 33–45)
HGB BLD-MCNC: 11.2 G/DL (ref 11.5–14.8)
LEUKOCYTE ESTERASE UR QL STRIP: NEGATIVE
LIPASE SERPL-CCNC: 261 U/L (ref 73–393)
LYMPHOCYTES NFR BLD AUTO: 1.6 K/UL (ref 0.8–4.8)
LYMPHOCYTES NFR BLD AUTO: 44.6 % (ref 20–44)
MCHC RBC AUTO-ENTMCNC: 33 G/DL (ref 31–36)
MCV RBC AUTO: 87 FL (ref 82–100)
MONOCYTES NFR BLD AUTO: 0.2 K/UL (ref 0.1–1.3)
MONOCYTES NFR BLD AUTO: 6.5 % (ref 2–12)
NEUTROPHILS # BLD AUTO: 1.6 K/UL (ref 1.8–8.9)
NEUTROPHILS NFR BLD AUTO: 45.2 % (ref 43–81)
NITRITE UR QL STRIP: NEGATIVE
PH UR STRIP: 7 [PH] (ref 5–8)
PLATELET # BLD AUTO: 202 K/UL (ref 150–450)
POTASSIUM SERPL-SCNC: 3.9 MMOL/L (ref 3.5–5.1)
PROT SERPL-MCNC: 6.4 G/DL (ref 6.4–8.2)
PROT UR QL STRIP: NEGATIVE MG/DL
RBC # BLD AUTO: 3.91 MIL/UL (ref 4–5.2)
SODIUM SERPL-SCNC: 145 MMOL/L (ref 136–145)
UROBILINOGEN UR STRIP-MCNC: 0.2 EU/DL
WBC NRBC COR # BLD AUTO: 3.6 K/UL (ref 4.3–11)

## 2022-10-21 PROCEDURE — 81003 URINALYSIS AUTO W/O SCOPE: CPT

## 2022-10-21 PROCEDURE — 80076 HEPATIC FUNCTION PANEL: CPT

## 2022-10-21 PROCEDURE — 71045 X-RAY EXAM CHEST 1 VIEW: CPT

## 2022-10-21 PROCEDURE — 83690 ASSAY OF LIPASE: CPT

## 2022-10-21 PROCEDURE — 99284 EMERGENCY DEPT VISIT MOD MDM: CPT

## 2022-10-21 PROCEDURE — 96374 THER/PROPH/DIAG INJ IV PUSH: CPT

## 2022-10-21 PROCEDURE — 80048 BASIC METABOLIC PNL TOTAL CA: CPT

## 2022-10-21 PROCEDURE — 85025 COMPLETE CBC W/AUTO DIFF WBC: CPT

## 2022-10-21 PROCEDURE — 36415 COLL VENOUS BLD VENIPUNCTURE: CPT

## 2022-11-23 ENCOUNTER — HOSPITAL ENCOUNTER (EMERGENCY)
Dept: HOSPITAL 4 - SED | Age: 37
Discharge: HOME | End: 2022-11-23
Payer: COMMERCIAL

## 2022-11-23 VITALS — WEIGHT: 114 LBS | HEIGHT: 65 IN | BODY MASS INDEX: 18.99 KG/M2

## 2022-11-23 VITALS — SYSTOLIC BLOOD PRESSURE: 101 MMHG

## 2022-11-23 VITALS — SYSTOLIC BLOOD PRESSURE: 118 MMHG

## 2022-11-23 DIAGNOSIS — Z88.0: ICD-10-CM

## 2022-11-23 DIAGNOSIS — Z88.2: ICD-10-CM

## 2022-11-23 DIAGNOSIS — R10.9: ICD-10-CM

## 2022-11-23 DIAGNOSIS — Z88.8: ICD-10-CM

## 2022-11-23 DIAGNOSIS — K21.9: ICD-10-CM

## 2022-11-23 DIAGNOSIS — Z91.041: ICD-10-CM

## 2022-11-23 DIAGNOSIS — Z88.6: ICD-10-CM

## 2022-11-23 DIAGNOSIS — R11.10: ICD-10-CM

## 2022-11-23 DIAGNOSIS — Z91.040: ICD-10-CM

## 2022-11-23 DIAGNOSIS — Z88.5: ICD-10-CM

## 2022-11-23 DIAGNOSIS — Z79.899: ICD-10-CM

## 2022-11-23 DIAGNOSIS — C79.81: Primary | ICD-10-CM

## 2022-11-23 DIAGNOSIS — Z88.1: ICD-10-CM

## 2022-11-23 LAB
ALBUMIN SERPL BCP-MCNC: 4.1 G/DL (ref 3.4–4.8)
ALT SERPL W P-5'-P-CCNC: 14 U/L (ref 12–78)
ANION GAP SERPL CALCULATED.3IONS-SCNC: 8 MMOL/L (ref 5–15)
APPEARANCE UR: CLEAR
AST SERPL W P-5'-P-CCNC: 17 U/L (ref 10–37)
BASOPHILS # BLD AUTO: 0 K/UL (ref 0–0.2)
BASOPHILS NFR BLD AUTO: 0.3 % (ref 0–2)
BILIRUB SERPL-MCNC: 0.2 MG/DL (ref 0–1)
BILIRUB UR QL STRIP: NEGATIVE
BUN SERPL-MCNC: 15 MG/DL (ref 8–21)
CALCIUM SERPL-MCNC: 8.7 MG/DL (ref 8.4–11)
CHLORIDE SERPL-SCNC: 104 MMOL/L (ref 98–107)
COLOR UR: YELLOW
CREAT SERPL-MCNC: 0.71 MG/DL (ref 0.55–1.3)
EOSINOPHIL # BLD AUTO: 0.1 K/UL (ref 0–0.4)
EOSINOPHIL NFR BLD AUTO: 2.3 % (ref 0–4)
ERYTHROCYTE [DISTWIDTH] IN BLOOD BY AUTOMATED COUNT: 15.6 % (ref 9–15)
GFR SERPL CREATININE-BSD FRML MDRD: 118 ML/MIN (ref 90–?)
GLUCOSE SERPL-MCNC: 99 MG/DL (ref 70–99)
GLUCOSE UR STRIP-MCNC: NEGATIVE MG/DL
HCT VFR BLD AUTO: 33.4 % (ref 36–48)
HGB BLD-MCNC: 11.4 G/DL (ref 12–16)
HGB UR QL STRIP: NEGATIVE
KETONES UR STRIP-MCNC: NEGATIVE MG/DL
LEUKOCYTE ESTERASE UR QL STRIP: NEGATIVE
LIPASE SERPL-CCNC: 299 U/L (ref 73–393)
LYMPHOCYTES # BLD AUTO: 1.6 K/UL (ref 1–5.5)
LYMPHOCYTES NFR BLD AUTO: 39.3 % (ref 20.5–51.5)
MCH RBC QN AUTO: 30 PG (ref 27–31)
MCHC RBC AUTO-ENTMCNC: 34 % (ref 32–36)
MCV RBC AUTO: 88 FL (ref 79–98)
MONOCYTES # BLD MANUAL: 0.2 K/UL (ref 0–1)
MONOCYTES # BLD MANUAL: 5.6 % (ref 1.7–9.3)
NEUTROPHILS # BLD AUTO: 2.1 K/UL (ref 1.8–7.7)
NEUTROPHILS NFR BLD AUTO: 52.5 % (ref 40–70)
NITRITE UR QL STRIP: NEGATIVE
PH UR STRIP: 7.5 [PH] (ref 5–8)
PLATELET # BLD AUTO: 226 K/UL (ref 130–430)
PROT UR QL STRIP: NEGATIVE
RBC # BLD AUTO: 3.8 MIL/UL (ref 4.2–6.2)
SP GR UR STRIP: 1.01 (ref 1–1.03)
UROBILINOGEN UR STRIP-MCNC: 0.2 MG/DL (ref 0.2–1)
WBC # BLD AUTO: 4 K/UL (ref 4.8–10.8)

## 2022-11-23 PROCEDURE — 80053 COMPREHEN METABOLIC PANEL: CPT

## 2022-11-23 PROCEDURE — 96361 HYDRATE IV INFUSION ADD-ON: CPT

## 2022-11-23 PROCEDURE — 76376 3D RENDER W/INTRP POSTPROCES: CPT

## 2022-11-23 PROCEDURE — 36415 COLL VENOUS BLD VENIPUNCTURE: CPT

## 2022-11-23 PROCEDURE — 99285 EMERGENCY DEPT VISIT HI MDM: CPT

## 2022-11-23 PROCEDURE — 81025 URINE PREGNANCY TEST: CPT

## 2022-11-23 PROCEDURE — 85025 COMPLETE CBC W/AUTO DIFF WBC: CPT

## 2022-11-23 PROCEDURE — 83690 ASSAY OF LIPASE: CPT

## 2022-11-23 PROCEDURE — 74177 CT ABD & PELVIS W/CONTRAST: CPT

## 2022-11-23 PROCEDURE — 96375 TX/PRO/DX INJ NEW DRUG ADDON: CPT

## 2022-11-23 PROCEDURE — 81003 URINALYSIS AUTO W/O SCOPE: CPT

## 2022-11-23 PROCEDURE — 96376 TX/PRO/DX INJ SAME DRUG ADON: CPT

## 2022-11-23 PROCEDURE — 96374 THER/PROPH/DIAG INJ IV PUSH: CPT

## 2022-11-23 NOTE — NUR
Patient given written and verbal discharge instructions and verbalizes 
understanding.  ER MD discussed with patient the results and treatment 
provided. Patient in stable condition. ID arm band removed.

Patient educated on pain management and to follow up with PMD. Pain Scale 3/10.

Opportunity for questions provided and answered. Patient in stable condition 
upon discharge

## 2022-11-23 NOTE — NUR
Patient A/Ox3, GCS 15, VSS, resp even and unlabored. Patient is lying in bed 
with safety precautions in place. Patient states "I'm okay right now, I feel 
better than when I came in earlier." Nad noted at this time.

## 2022-11-23 NOTE — NUR
Pt bib self from home CC bone pain right femur, right kidney, nausea and 
vomiting. states dizzy since last night. Pt has extensive medical history 
including breast cancer, hip and femur injury. Pt is aaox3, denies sob, 
speaking full sentences. skin intact. Pt has a port a cath to right clavicle.

## 2022-11-23 NOTE — NUR
Pt taken to radiology for CT with contrast. Medicated with Benadryl per MD 
order. Pt states mild reaction to iodine.

## 2023-02-06 ENCOUNTER — HOSPITAL ENCOUNTER (EMERGENCY)
Dept: HOSPITAL 4 - SED | Age: 38
Discharge: HOME | End: 2023-02-06
Payer: COMMERCIAL

## 2023-02-06 VITALS — SYSTOLIC BLOOD PRESSURE: 103 MMHG

## 2023-02-06 VITALS — BODY MASS INDEX: 18.33 KG/M2 | WEIGHT: 110 LBS | HEIGHT: 65 IN

## 2023-02-06 DIAGNOSIS — Z88.8: ICD-10-CM

## 2023-02-06 DIAGNOSIS — Z88.0: ICD-10-CM

## 2023-02-06 DIAGNOSIS — Z88.6: ICD-10-CM

## 2023-02-06 DIAGNOSIS — Z91.041: ICD-10-CM

## 2023-02-06 DIAGNOSIS — R53.1: ICD-10-CM

## 2023-02-06 DIAGNOSIS — Z88.1: ICD-10-CM

## 2023-02-06 DIAGNOSIS — M25.551: Primary | ICD-10-CM

## 2023-02-06 DIAGNOSIS — N64.4: ICD-10-CM

## 2023-02-06 DIAGNOSIS — Z79.899: ICD-10-CM

## 2023-02-06 DIAGNOSIS — K21.9: ICD-10-CM

## 2023-02-06 DIAGNOSIS — Z88.5: ICD-10-CM

## 2023-02-06 LAB
ACETONE EXG QL: NEGATIVE
ALBUMIN SERPL BCP-MCNC: 4.1 G/DL (ref 3.4–4.8)
ALT SERPL W P-5'-P-CCNC: 20 U/L (ref 12–78)
AMYLASE SERPL-CCNC: 77 U/L (ref 0–100)
ANION GAP SERPL CALCULATED.3IONS-SCNC: 9 MMOL/L (ref 5–15)
APPEARANCE UR: (no result)
AST SERPL W P-5'-P-CCNC: 20 U/L (ref 10–37)
BACTERIA URNS QL MICRO: (no result) /HPF
BASOPHILS # BLD AUTO: 0 K/UL (ref 0–0.2)
BASOPHILS NFR BLD AUTO: 0.3 % (ref 0–2)
BILIRUB SERPL-MCNC: 0.3 MG/DL (ref 0–1)
BILIRUB UR QL STRIP: NEGATIVE
BUN SERPL-MCNC: 18 MG/DL (ref 8–21)
CALCIUM SERPL-MCNC: 9.3 MG/DL (ref 8.4–11)
CHLORIDE SERPL-SCNC: 106 MMOL/L (ref 98–107)
COLOR UR: YELLOW
CREAT SERPL-MCNC: 0.63 MG/DL (ref 0.55–1.3)
EOSINOPHIL # BLD AUTO: 0 K/UL (ref 0–0.4)
EOSINOPHIL NFR BLD AUTO: 1.1 % (ref 0–4)
ERYTHROCYTE [DISTWIDTH] IN BLOOD BY AUTOMATED COUNT: 15.3 % (ref 9–15)
GFR SERPL CREATININE-BSD FRML MDRD: 136 ML/MIN (ref 90–?)
GLUCOSE SERPL-MCNC: 95 MG/DL (ref 70–99)
GLUCOSE UR STRIP-MCNC: NEGATIVE MG/DL
HCT VFR BLD AUTO: 34.1 % (ref 36–48)
HGB BLD-MCNC: 11.5 G/DL (ref 12–16)
HGB UR QL STRIP: NEGATIVE
INR PPP: 1.1 (ref 0.8–1.2)
KETONES UR STRIP-MCNC: (no result) MG/DL
LEUKOCYTE ESTERASE UR QL STRIP: (no result)
LIPASE SERPL-CCNC: 235 U/L (ref 73–393)
LYMPHOCYTES # BLD AUTO: 1.6 K/UL (ref 1–5.5)
LYMPHOCYTES NFR BLD AUTO: 39.8 % (ref 20.5–51.5)
MCH RBC QN AUTO: 29 PG (ref 27–31)
MCHC RBC AUTO-ENTMCNC: 34 % (ref 32–36)
MCV RBC AUTO: 86 FL (ref 79–98)
MONOCYTES # BLD MANUAL: 0.3 K/UL (ref 0–1)
MONOCYTES # BLD MANUAL: 6.7 % (ref 1.7–9.3)
MUCOUS THREADS URNS QL MICRO: (no result) /LPF
NEUTROPHILS # BLD AUTO: 2.1 K/UL (ref 1.8–7.7)
NEUTROPHILS NFR BLD AUTO: 52.1 % (ref 40–70)
NITRITE UR QL STRIP: NEGATIVE
PH UR STRIP: 7 [PH] (ref 5–8)
PLATELET # BLD AUTO: 246 K/UL (ref 130–430)
PROT UR QL STRIP: (no result)
PROTHROMBIN TIME: 11.5 SECS (ref 9.5–12.5)
RBC # BLD AUTO: 3.97 MIL/UL (ref 4.2–6.2)
RBC #/AREA URNS HPF: (no result) /HPF (ref 0–3)
SP GR UR STRIP: 1.02 (ref 1–1.03)
UROBILINOGEN UR STRIP-MCNC: 0.2 MG/DL (ref 0.2–1)
WBC # BLD AUTO: 3.9 K/UL (ref 4.8–10.8)
WBC #/AREA URNS HPF: (no result) /HPF (ref 0–3)

## 2023-02-06 PROCEDURE — 96361 HYDRATE IV INFUSION ADD-ON: CPT

## 2023-02-06 PROCEDURE — 84484 ASSAY OF TROPONIN QUANT: CPT

## 2023-02-06 PROCEDURE — 81025 URINE PREGNANCY TEST: CPT

## 2023-02-06 PROCEDURE — 96375 TX/PRO/DX INJ NEW DRUG ADDON: CPT

## 2023-02-06 PROCEDURE — 82550 ASSAY OF CK (CPK): CPT

## 2023-02-06 PROCEDURE — 82150 ASSAY OF AMYLASE: CPT

## 2023-02-06 PROCEDURE — 85730 THROMBOPLASTIN TIME PARTIAL: CPT

## 2023-02-06 PROCEDURE — 99284 EMERGENCY DEPT VISIT MOD MDM: CPT

## 2023-02-06 PROCEDURE — 83605 ASSAY OF LACTIC ACID: CPT

## 2023-02-06 PROCEDURE — 96374 THER/PROPH/DIAG INJ IV PUSH: CPT

## 2023-02-06 PROCEDURE — 80053 COMPREHEN METABOLIC PANEL: CPT

## 2023-02-06 PROCEDURE — 82009 KETONE BODYS QUAL: CPT

## 2023-02-06 PROCEDURE — 85610 PROTHROMBIN TIME: CPT

## 2023-02-06 PROCEDURE — 85025 COMPLETE CBC W/AUTO DIFF WBC: CPT

## 2023-02-06 PROCEDURE — 83690 ASSAY OF LIPASE: CPT

## 2023-02-06 PROCEDURE — 93005 ELECTROCARDIOGRAM TRACING: CPT

## 2023-02-06 PROCEDURE — 87086 URINE CULTURE/COLONY COUNT: CPT

## 2023-02-06 PROCEDURE — 36415 COLL VENOUS BLD VENIPUNCTURE: CPT

## 2023-02-06 PROCEDURE — 81000 URINALYSIS NONAUTO W/SCOPE: CPT

## 2023-02-06 RX ADMIN — ONDANSETRON ONE MG: 2 INJECTION INTRAMUSCULAR; INTRAVENOUS at 15:44

## 2023-02-06 RX ADMIN — HYDROMORPHONE HYDROCHLORIDE ONE MG: 1 INJECTION, SOLUTION INTRAMUSCULAR; INTRAVENOUS; SUBCUTANEOUS at 17:52

## 2023-02-06 RX ADMIN — MORPHINE SULFATE ONE MG: 4 INJECTION INTRAVENOUS at 15:44

## 2023-02-06 RX ADMIN — SODIUM CHLORIDE ONE MLS/HR: 9 INJECTION, SOLUTION INTRAVENOUS at 15:43

## 2023-04-12 ENCOUNTER — HOSPITAL ENCOUNTER (EMERGENCY)
Dept: HOSPITAL 4 - SED | Age: 38
Discharge: HOME | End: 2023-04-12
Payer: COMMERCIAL

## 2023-04-12 VITALS — WEIGHT: 105 LBS | HEIGHT: 65 IN | SYSTOLIC BLOOD PRESSURE: 115 MMHG | BODY MASS INDEX: 17.49 KG/M2

## 2023-04-12 VITALS — SYSTOLIC BLOOD PRESSURE: 118 MMHG

## 2023-04-12 DIAGNOSIS — R19.7: Primary | ICD-10-CM

## 2023-04-12 DIAGNOSIS — E86.0: ICD-10-CM

## 2023-04-12 DIAGNOSIS — Z88.8: ICD-10-CM

## 2023-04-12 DIAGNOSIS — Z88.1: ICD-10-CM

## 2023-04-12 DIAGNOSIS — Z96.649: ICD-10-CM

## 2023-04-12 DIAGNOSIS — K21.9: ICD-10-CM

## 2023-04-12 DIAGNOSIS — R78.81: ICD-10-CM

## 2023-04-12 DIAGNOSIS — Z79.899: ICD-10-CM

## 2023-04-12 DIAGNOSIS — Z88.2: ICD-10-CM

## 2023-04-12 DIAGNOSIS — C44.702: ICD-10-CM

## 2023-04-12 DIAGNOSIS — Z85.3: ICD-10-CM

## 2023-04-12 DIAGNOSIS — Z88.0: ICD-10-CM

## 2023-04-12 LAB
ALBUMIN SERPL BCP-MCNC: 4 G/DL (ref 3.4–4.8)
ALT SERPL W P-5'-P-CCNC: 22 U/L (ref 12–78)
ANION GAP SERPL CALCULATED.3IONS-SCNC: 9 MMOL/L (ref 5–15)
APPEARANCE UR: CLEAR
AST SERPL W P-5'-P-CCNC: 18 U/L (ref 10–37)
BACTERIA URNS QL MICRO: (no result) /HPF
BASOPHILS # BLD AUTO: 0 K/UL (ref 0–0.2)
BASOPHILS NFR BLD AUTO: 0.5 % (ref 0–2)
BILIRUB SERPL-MCNC: 0.3 MG/DL (ref 0–1)
BILIRUB UR QL STRIP: NEGATIVE
BUN SERPL-MCNC: 18 MG/DL (ref 8–21)
CALCIUM SERPL-MCNC: 9.4 MG/DL (ref 8.4–11)
CHLORIDE SERPL-SCNC: 104 MMOL/L (ref 98–107)
COLOR UR: YELLOW
CREAT SERPL-MCNC: 0.75 MG/DL (ref 0.55–1.3)
EOSINOPHIL # BLD AUTO: 0.2 K/UL (ref 0–0.4)
EOSINOPHIL NFR BLD AUTO: 2.9 % (ref 0–4)
ERYTHROCYTE [DISTWIDTH] IN BLOOD BY AUTOMATED COUNT: 17.6 % (ref 9–15)
GFR SERPL CREATININE-BSD FRML MDRD: 111 ML/MIN (ref 90–?)
GLUCOSE SERPL-MCNC: 107 MG/DL (ref 70–99)
GLUCOSE UR STRIP-MCNC: NEGATIVE MG/DL
HCT VFR BLD AUTO: 38.5 % (ref 36–48)
HGB BLD-MCNC: 12.7 G/DL (ref 12–16)
HGB UR QL STRIP: NEGATIVE
KETONES UR STRIP-MCNC: NEGATIVE MG/DL
LEUKOCYTE ESTERASE UR QL STRIP: (no result)
LYMPHOCYTES # BLD AUTO: 2.4 K/UL (ref 1–5.5)
LYMPHOCYTES NFR BLD AUTO: 34.2 % (ref 20.5–51.5)
MCH RBC QN AUTO: 28 PG (ref 27–31)
MCHC RBC AUTO-ENTMCNC: 33 % (ref 32–36)
MCV RBC AUTO: 85 FL (ref 79–98)
MONOCYTES # BLD MANUAL: 0.3 K/UL (ref 0–1)
MONOCYTES # BLD MANUAL: 4.7 % (ref 1.7–9.3)
MUCOUS THREADS URNS QL MICRO: (no result) /LPF
NEUTROPHILS # BLD AUTO: 4.1 K/UL (ref 1.8–7.7)
NEUTROPHILS NFR BLD AUTO: 57.7 % (ref 40–70)
NITRITE UR QL STRIP: NEGATIVE
PH UR STRIP: 7 [PH] (ref 5–8)
PLATELET # BLD AUTO: 279 K/UL (ref 130–430)
PROT UR QL STRIP: NEGATIVE
RBC # BLD AUTO: 4.52 MIL/UL (ref 4.2–6.2)
RBC #/AREA URNS HPF: (no result) /HPF (ref 0–3)
SP GR UR STRIP: 1.02 (ref 1–1.03)
UROBILINOGEN UR STRIP-MCNC: 1 MG/DL (ref 0.2–1)
WBC # BLD AUTO: 7.1 K/UL (ref 4.8–10.8)

## 2023-04-12 PROCEDURE — 36415 COLL VENOUS BLD VENIPUNCTURE: CPT

## 2023-04-12 PROCEDURE — 87230 TOXIN/ANTITOXIN ASY TISS CUL: CPT

## 2023-04-12 PROCEDURE — 96375 TX/PRO/DX INJ NEW DRUG ADDON: CPT

## 2023-04-12 PROCEDURE — 87045 FECES CULTURE AEROBIC BACT: CPT

## 2023-04-12 PROCEDURE — 83605 ASSAY OF LACTIC ACID: CPT

## 2023-04-12 PROCEDURE — 96376 TX/PRO/DX INJ SAME DRUG ADON: CPT

## 2023-04-12 PROCEDURE — 85025 COMPLETE CBC W/AUTO DIFF WBC: CPT

## 2023-04-12 PROCEDURE — 81000 URINALYSIS NONAUTO W/SCOPE: CPT

## 2023-04-12 PROCEDURE — 87177 OVA AND PARASITES SMEARS: CPT

## 2023-04-12 PROCEDURE — 96361 HYDRATE IV INFUSION ADD-ON: CPT

## 2023-04-12 PROCEDURE — 71045 X-RAY EXAM CHEST 1 VIEW: CPT

## 2023-04-12 PROCEDURE — 87040 BLOOD CULTURE FOR BACTERIA: CPT

## 2023-04-12 PROCEDURE — 89055 LEUKOCYTE ASSESSMENT FECAL: CPT

## 2023-04-12 PROCEDURE — 82272 OCCULT BLD FECES 1-3 TESTS: CPT

## 2023-04-12 PROCEDURE — 87086 URINE CULTURE/COLONY COUNT: CPT

## 2023-04-12 PROCEDURE — 99284 EMERGENCY DEPT VISIT MOD MDM: CPT

## 2023-04-12 PROCEDURE — 81025 URINE PREGNANCY TEST: CPT

## 2023-04-12 PROCEDURE — 96374 THER/PROPH/DIAG INJ IV PUSH: CPT

## 2023-04-12 PROCEDURE — 87046 STOOL CULTR AEROBIC BACT EA: CPT

## 2023-04-12 PROCEDURE — 80053 COMPREHEN METABOLIC PANEL: CPT

## 2023-04-19 ENCOUNTER — HOSPITAL ENCOUNTER (EMERGENCY)
Dept: HOSPITAL 4 - SED | Age: 38
Discharge: HOME | End: 2023-04-19
Payer: COMMERCIAL

## 2023-04-19 VITALS — WEIGHT: 105 LBS | HEIGHT: 65 IN | BODY MASS INDEX: 17.49 KG/M2

## 2023-04-19 VITALS — SYSTOLIC BLOOD PRESSURE: 103 MMHG

## 2023-04-19 DIAGNOSIS — Z88.6: ICD-10-CM

## 2023-04-19 DIAGNOSIS — Z88.1: ICD-10-CM

## 2023-04-19 DIAGNOSIS — N39.0: Primary | ICD-10-CM

## 2023-04-19 DIAGNOSIS — Z91.040: ICD-10-CM

## 2023-04-19 DIAGNOSIS — Z88.0: ICD-10-CM

## 2023-04-19 DIAGNOSIS — R35.0: ICD-10-CM

## 2023-04-19 DIAGNOSIS — Z88.8: ICD-10-CM

## 2023-04-19 DIAGNOSIS — Z79.899: ICD-10-CM

## 2023-04-19 DIAGNOSIS — K21.9: ICD-10-CM

## 2023-04-19 DIAGNOSIS — Z88.2: ICD-10-CM

## 2023-04-19 DIAGNOSIS — R50.9: ICD-10-CM

## 2023-04-19 DIAGNOSIS — R30.0: ICD-10-CM

## 2023-04-19 LAB
ALBUMIN SERPL BCP-MCNC: 4 G/DL (ref 3.4–4.8)
ALT SERPL W P-5'-P-CCNC: 18 U/L (ref 12–78)
ANION GAP SERPL CALCULATED.3IONS-SCNC: 10 MMOL/L (ref 5–15)
APPEARANCE UR: CLEAR
AST SERPL W P-5'-P-CCNC: 13 U/L (ref 10–37)
BACTERIA URNS QL MICRO: (no result) /HPF
BASOPHILS # BLD AUTO: 0 K/UL (ref 0–0.2)
BASOPHILS NFR BLD AUTO: 0.6 % (ref 0–2)
BILIRUB SERPL-MCNC: 0.7 MG/DL (ref 0–1)
BILIRUB UR QL STRIP: NEGATIVE
BUN SERPL-MCNC: 14 MG/DL (ref 8–21)
CALCIUM SERPL-MCNC: 9 MG/DL (ref 8.4–11)
CHLORIDE SERPL-SCNC: 102 MMOL/L (ref 98–107)
COLOR UR: YELLOW
CREAT SERPL-MCNC: 0.83 MG/DL (ref 0.55–1.3)
EOSINOPHIL # BLD AUTO: 0 K/UL (ref 0–0.4)
EOSINOPHIL NFR BLD AUTO: 0.6 % (ref 0–4)
ERYTHROCYTE [DISTWIDTH] IN BLOOD BY AUTOMATED COUNT: 17.1 % (ref 9–15)
GFR SERPL CREATININE-BSD FRML MDRD: 99 ML/MIN (ref 90–?)
GLUCOSE SERPL-MCNC: 111 MG/DL (ref 70–99)
GLUCOSE UR STRIP-MCNC: NEGATIVE MG/DL
HCT VFR BLD AUTO: 37.4 % (ref 36–48)
HGB BLD-MCNC: 12.3 G/DL (ref 12–16)
HGB UR QL STRIP: (no result)
KETONES UR STRIP-MCNC: (no result) MG/DL
LEUKOCYTE ESTERASE UR QL STRIP: (no result)
LYMPHOCYTES # BLD AUTO: 1.6 K/UL (ref 1–5.5)
LYMPHOCYTES NFR BLD AUTO: 29.9 % (ref 20.5–51.5)
MCH RBC QN AUTO: 28 PG (ref 27–31)
MCHC RBC AUTO-ENTMCNC: 33 % (ref 32–36)
MCV RBC AUTO: 85 FL (ref 79–98)
MONOCYTES # BLD MANUAL: 0.4 K/UL (ref 0–1)
MONOCYTES # BLD MANUAL: 8 % (ref 1.7–9.3)
MUCOUS THREADS URNS QL MICRO: (no result) /LPF
NEUTROPHILS # BLD AUTO: 3.2 K/UL (ref 1.8–7.7)
NEUTROPHILS NFR BLD AUTO: 60.9 % (ref 40–70)
NITRITE UR QL STRIP: NEGATIVE
PH UR STRIP: 6 [PH] (ref 5–8)
PLATELET # BLD AUTO: 164 K/UL (ref 130–430)
PROT UR QL STRIP: NEGATIVE
RBC # BLD AUTO: 4.38 MIL/UL (ref 4.2–6.2)
RBC #/AREA URNS HPF: (no result) /HPF (ref 0–3)
SP GR UR STRIP: >=1.03 (ref 1–1.03)
UROBILINOGEN UR STRIP-MCNC: 0.2 MG/DL (ref 0.2–1)
WBC # BLD AUTO: 5.2 K/UL (ref 4.8–10.8)
WBC #/AREA URNS HPF: (no result) /HPF (ref 0–3)

## 2023-04-19 PROCEDURE — 76376 3D RENDER W/INTRP POSTPROCES: CPT

## 2023-04-19 PROCEDURE — 87086 URINE CULTURE/COLONY COUNT: CPT

## 2023-04-19 PROCEDURE — 71045 X-RAY EXAM CHEST 1 VIEW: CPT

## 2023-04-19 PROCEDURE — 74176 CT ABD & PELVIS W/O CONTRAST: CPT

## 2023-04-19 PROCEDURE — 85025 COMPLETE CBC W/AUTO DIFF WBC: CPT

## 2023-04-19 PROCEDURE — 99285 EMERGENCY DEPT VISIT HI MDM: CPT

## 2023-04-19 PROCEDURE — 96361 HYDRATE IV INFUSION ADD-ON: CPT

## 2023-04-19 PROCEDURE — 80053 COMPREHEN METABOLIC PANEL: CPT

## 2023-04-19 PROCEDURE — 83605 ASSAY OF LACTIC ACID: CPT

## 2023-04-19 PROCEDURE — 81000 URINALYSIS NONAUTO W/SCOPE: CPT

## 2023-04-19 PROCEDURE — 96365 THER/PROPH/DIAG IV INF INIT: CPT

## 2023-04-19 PROCEDURE — 36415 COLL VENOUS BLD VENIPUNCTURE: CPT

## 2023-04-19 PROCEDURE — 96375 TX/PRO/DX INJ NEW DRUG ADDON: CPT

## 2023-04-19 PROCEDURE — 87040 BLOOD CULTURE FOR BACTERIA: CPT

## 2023-05-18 ENCOUNTER — HOSPITAL ENCOUNTER (EMERGENCY)
Dept: HOSPITAL 4 - SED | Age: 38
Discharge: HOME | End: 2023-05-18
Payer: COMMERCIAL

## 2023-05-18 VITALS — SYSTOLIC BLOOD PRESSURE: 137 MMHG

## 2023-05-18 VITALS — SYSTOLIC BLOOD PRESSURE: 115 MMHG

## 2023-05-18 VITALS — WEIGHT: 100 LBS | BODY MASS INDEX: 16.66 KG/M2 | HEIGHT: 65 IN

## 2023-05-18 DIAGNOSIS — F41.9: ICD-10-CM

## 2023-05-18 DIAGNOSIS — K21.9: ICD-10-CM

## 2023-05-18 DIAGNOSIS — Z88.5: ICD-10-CM

## 2023-05-18 DIAGNOSIS — R00.2: Primary | ICD-10-CM

## 2023-05-18 DIAGNOSIS — Z91.041: ICD-10-CM

## 2023-05-18 DIAGNOSIS — Z88.0: ICD-10-CM

## 2023-05-18 DIAGNOSIS — R10.13: ICD-10-CM

## 2023-05-18 DIAGNOSIS — Z88.8: ICD-10-CM

## 2023-05-18 DIAGNOSIS — Z88.1: ICD-10-CM

## 2023-05-18 DIAGNOSIS — Z88.2: ICD-10-CM

## 2023-05-18 DIAGNOSIS — Z79.899: ICD-10-CM

## 2023-05-18 DIAGNOSIS — R06.02: ICD-10-CM

## 2023-05-18 LAB
ALBUMIN SERPL BCP-MCNC: 3.7 G/DL (ref 3.4–4.8)
ALT SERPL W P-5'-P-CCNC: 21 U/L (ref 12–78)
ANION GAP SERPL CALCULATED.3IONS-SCNC: 8 MMOL/L (ref 5–15)
AST SERPL W P-5'-P-CCNC: 16 U/L (ref 10–37)
BASOPHILS # BLD AUTO: 0 K/UL (ref 0–0.2)
BASOPHILS NFR BLD AUTO: 0.3 % (ref 0–2)
BILIRUB SERPL-MCNC: 0.2 MG/DL (ref 0–1)
BUN SERPL-MCNC: 11 MG/DL (ref 8–21)
CALCIUM SERPL-MCNC: 8.9 MG/DL (ref 8.4–11)
CHLORIDE SERPL-SCNC: 105 MMOL/L (ref 98–107)
CREAT SERPL-MCNC: 0.75 MG/DL (ref 0.55–1.3)
EOSINOPHIL # BLD AUTO: 0.2 K/UL (ref 0–0.4)
EOSINOPHIL NFR BLD AUTO: 4 % (ref 0–4)
ERYTHROCYTE [DISTWIDTH] IN BLOOD BY AUTOMATED COUNT: 16.7 % (ref 9–15)
GFR SERPL CREATININE-BSD FRML MDRD: 111 ML/MIN (ref 90–?)
GLUCOSE SERPL-MCNC: 112 MG/DL (ref 70–99)
HCT VFR BLD AUTO: 33.3 % (ref 36–48)
HGB BLD-MCNC: 11 G/DL (ref 12–16)
LYMPHOCYTES # BLD AUTO: 1.5 K/UL (ref 1–5.5)
LYMPHOCYTES NFR BLD AUTO: 36.8 % (ref 20.5–51.5)
MCH RBC QN AUTO: 29 PG (ref 27–31)
MCHC RBC AUTO-ENTMCNC: 33 % (ref 32–36)
MCV RBC AUTO: 89 FL (ref 79–98)
MONOCYTES # BLD MANUAL: 0.3 K/UL (ref 0–1)
MONOCYTES # BLD MANUAL: 6.4 % (ref 1.7–9.3)
NEUTROPHILS # BLD AUTO: 2.2 K/UL (ref 1.8–7.7)
NEUTROPHILS NFR BLD AUTO: 52.5 % (ref 40–70)
PLATELET # BLD AUTO: 180 K/UL (ref 130–430)
RBC # BLD AUTO: 3.76 MIL/UL (ref 4.2–6.2)
WBC # BLD AUTO: 4.2 K/UL (ref 4.8–10.8)

## 2023-05-18 PROCEDURE — 36415 COLL VENOUS BLD VENIPUNCTURE: CPT

## 2023-05-18 PROCEDURE — 80053 COMPREHEN METABOLIC PANEL: CPT

## 2023-05-18 PROCEDURE — 84484 ASSAY OF TROPONIN QUANT: CPT

## 2023-05-18 PROCEDURE — 93005 ELECTROCARDIOGRAM TRACING: CPT

## 2023-05-18 PROCEDURE — 83880 ASSAY OF NATRIURETIC PEPTIDE: CPT

## 2023-05-18 PROCEDURE — 71045 X-RAY EXAM CHEST 1 VIEW: CPT

## 2023-05-18 PROCEDURE — 99285 EMERGENCY DEPT VISIT HI MDM: CPT

## 2023-05-18 PROCEDURE — 84703 CHORIONIC GONADOTROPIN ASSAY: CPT

## 2023-05-18 PROCEDURE — 84443 ASSAY THYROID STIM HORMONE: CPT

## 2023-05-18 PROCEDURE — 96372 THER/PROPH/DIAG INJ SC/IM: CPT

## 2023-05-18 PROCEDURE — 85025 COMPLETE CBC W/AUTO DIFF WBC: CPT

## 2023-05-18 NOTE — NUR
Pt brought by self, A&Ox4, pt presents to ER with chest wall pain/epigastric 
pain since this am, respirations even and unlabored, cap refill <3, no N/V 
noted, will cont to monitor.

## 2023-05-18 NOTE — NUR
Patient given written and verbal discharge instructions and verbalizes 
understanding.  ER MD discussed with patient the results and treatment 
provided. Patient in stable condition. ID arm band removed. 

Rx of ATIVAN given. Patient educated on PALPITATIONS and to follow up with PMD. 
Pain Scale 4.

Opportunity for questions provided and answered. Medication side effect fact 
sheet provided.

## 2023-06-03 ENCOUNTER — HOSPITAL ENCOUNTER (EMERGENCY)
Dept: HOSPITAL 4 - SED | Age: 38
Discharge: HOME | End: 2023-06-03
Payer: COMMERCIAL

## 2023-06-03 VITALS — HEIGHT: 65 IN | BODY MASS INDEX: 18.33 KG/M2 | WEIGHT: 110 LBS

## 2023-06-03 VITALS — SYSTOLIC BLOOD PRESSURE: 110 MMHG

## 2023-06-03 VITALS — SYSTOLIC BLOOD PRESSURE: 113 MMHG

## 2023-06-03 DIAGNOSIS — Z88.0: ICD-10-CM

## 2023-06-03 DIAGNOSIS — Z91.041: ICD-10-CM

## 2023-06-03 DIAGNOSIS — Z79.899: ICD-10-CM

## 2023-06-03 DIAGNOSIS — M79.10: ICD-10-CM

## 2023-06-03 DIAGNOSIS — Z88.6: ICD-10-CM

## 2023-06-03 DIAGNOSIS — Z88.2: ICD-10-CM

## 2023-06-03 DIAGNOSIS — R30.0: ICD-10-CM

## 2023-06-03 DIAGNOSIS — R10.30: Primary | ICD-10-CM

## 2023-06-03 DIAGNOSIS — Z88.5: ICD-10-CM

## 2023-06-03 LAB
ALBUMIN SERPL BCP-MCNC: 3.8 G/DL (ref 3.4–4.8)
ALT SERPL W P-5'-P-CCNC: 13 U/L (ref 12–78)
ANION GAP SERPL CALCULATED.3IONS-SCNC: 6 MMOL/L (ref 5–15)
APPEARANCE UR: CLEAR
AST SERPL W P-5'-P-CCNC: 19 U/L (ref 10–37)
BACTERIA URNS QL MICRO: (no result) /HPF
BASOPHILS # BLD AUTO: 0 K/UL (ref 0–0.2)
BASOPHILS NFR BLD AUTO: 0.2 % (ref 0–2)
BILIRUB SERPL-MCNC: 0.5 MG/DL (ref 0–1)
BILIRUB UR QL STRIP: NEGATIVE
BUN SERPL-MCNC: 12 MG/DL (ref 8–21)
CALCIUM SERPL-MCNC: 9.1 MG/DL (ref 8.4–11)
CHLORIDE SERPL-SCNC: 104 MMOL/L (ref 98–107)
COLOR UR: YELLOW
CREAT SERPL-MCNC: 0.75 MG/DL (ref 0.55–1.3)
EOSINOPHIL # BLD AUTO: 0 K/UL (ref 0–0.4)
EOSINOPHIL NFR BLD AUTO: 0.6 % (ref 0–4)
ERYTHROCYTE [DISTWIDTH] IN BLOOD BY AUTOMATED COUNT: 15.6 % (ref 9–15)
GFR SERPL CREATININE-BSD FRML MDRD: 111 ML/MIN (ref 90–?)
GLUCOSE SERPL-MCNC: 105 MG/DL (ref 70–99)
GLUCOSE UR STRIP-MCNC: NEGATIVE MG/DL
HCT VFR BLD AUTO: 33.7 % (ref 36–48)
HGB BLD-MCNC: 11.1 G/DL (ref 12–16)
HGB UR QL STRIP: (no result)
KETONES UR STRIP-MCNC: NEGATIVE MG/DL
LEUKOCYTE ESTERASE UR QL STRIP: NEGATIVE
LYMPHOCYTES # BLD AUTO: 1.4 K/UL (ref 1–5.5)
LYMPHOCYTES NFR BLD AUTO: 28.9 % (ref 20.5–51.5)
MCH RBC QN AUTO: 29 PG (ref 27–31)
MCHC RBC AUTO-ENTMCNC: 33 % (ref 32–36)
MCV RBC AUTO: 89 FL (ref 79–98)
MONOCYTES # BLD MANUAL: 0.3 K/UL (ref 0–1)
MONOCYTES # BLD MANUAL: 5.6 % (ref 1.7–9.3)
MUCOUS THREADS URNS QL MICRO: (no result) /LPF
NEUTROPHILS # BLD AUTO: 3.2 K/UL (ref 1.8–7.7)
NEUTROPHILS NFR BLD AUTO: 64.7 % (ref 40–70)
NITRITE UR QL STRIP: NEGATIVE
PH UR STRIP: 6 [PH] (ref 5–8)
PLATELET # BLD AUTO: 228 K/UL (ref 130–430)
PROT UR QL STRIP: NEGATIVE
RBC # BLD AUTO: 3.78 MIL/UL (ref 4.2–6.2)
RBC #/AREA URNS HPF: (no result) /HPF (ref 0–3)
SP GR UR STRIP: 1.02 (ref 1–1.03)
UROBILINOGEN UR STRIP-MCNC: 0.2 MG/DL (ref 0.2–1)
WBC # BLD AUTO: 4.9 K/UL (ref 4.8–10.8)
WBC #/AREA URNS HPF: (no result) /HPF (ref 0–3)

## 2023-06-03 PROCEDURE — 96376 TX/PRO/DX INJ SAME DRUG ADON: CPT

## 2023-06-03 PROCEDURE — 83605 ASSAY OF LACTIC ACID: CPT

## 2023-06-03 PROCEDURE — 36415 COLL VENOUS BLD VENIPUNCTURE: CPT

## 2023-06-03 PROCEDURE — 80053 COMPREHEN METABOLIC PANEL: CPT

## 2023-06-03 PROCEDURE — 96365 THER/PROPH/DIAG IV INF INIT: CPT

## 2023-06-03 PROCEDURE — 96361 HYDRATE IV INFUSION ADD-ON: CPT

## 2023-06-03 PROCEDURE — 87040 BLOOD CULTURE FOR BACTERIA: CPT

## 2023-06-03 PROCEDURE — 99284 EMERGENCY DEPT VISIT MOD MDM: CPT

## 2023-06-03 PROCEDURE — 81000 URINALYSIS NONAUTO W/SCOPE: CPT

## 2023-06-03 PROCEDURE — 96375 TX/PRO/DX INJ NEW DRUG ADDON: CPT

## 2023-06-03 PROCEDURE — 85025 COMPLETE CBC W/AUTO DIFF WBC: CPT

## 2023-06-03 NOTE — NUR
PT W/ MULTIPLE REQUESTS FOR ADDITIONAL ANALGESIA. DR SOLOMON AT  FOR DISCUSSION 
W/ PT. COMFORT MEASURES AND SUPPORTIVE CARE CONTINUEDD. PT OBSERVED REMOVING 
MONITORING EQUIPMENT AND SELF REMOVING IVF FROM IV COTTER NEEDLE EXTENSION 
TUBING PT PRESENTED W/. PT STATES SHE HOME MAINTAINS ALYSSA-CATH AND IT IS 
PATENT. DRSG IS CLEAN AND DRY AT PRESENT. NO DISCOLORATION, CLOTS, OR AREA 
CONCERN NOTED AT SITE OR WITH TUBING. COMFORT MEASURES AND SUPPORTIVE CARE 
CONTINUED.

## 2023-06-03 NOTE — NUR
PT INITIALLY REQUESTED PEPCID IV. DR. SOLOMON REFUSES TO ORDER. PT INFORMED. D/C 
INSTRUCTIONS GIVEN W/ COPIES OF LAB PER REQUESTED. W/C TO [facility] WITHOUT 
INCIDENT, WHERE MOTHER WAS WAITING IN VEHICLE.

## 2023-06-03 NOTE — NUR
Placed in room  03 . Placed on cardiac monitor, blood pressure machine and 
pulse oximeter. To gown for exam. Side rails up.

## 2023-06-03 NOTE — NUR
PER ANAHI HUBER. PT REFUSED TO D/C HOME UNTIL ATIVAN GIVEN DUE TO ANXIETY. 
ATIVAN 2 MG GIVEN BY ANAHI HUBER. PT HAD NO CHANGE MENTATION, AWAKENFULLNESS OR 
SLEEPINESS.

## 2023-06-22 ENCOUNTER — HOSPITAL ENCOUNTER (EMERGENCY)
Dept: HOSPITAL 4 - SED | Age: 38
Discharge: HOME | End: 2023-06-22
Payer: COMMERCIAL

## 2023-06-22 VITALS — BODY MASS INDEX: 18.33 KG/M2 | WEIGHT: 110 LBS | HEIGHT: 65 IN

## 2023-06-22 VITALS — SYSTOLIC BLOOD PRESSURE: 99 MMHG

## 2023-06-22 VITALS — SYSTOLIC BLOOD PRESSURE: 109 MMHG

## 2023-06-22 DIAGNOSIS — E86.0: ICD-10-CM

## 2023-06-22 DIAGNOSIS — K21.9: ICD-10-CM

## 2023-06-22 DIAGNOSIS — R11.0: ICD-10-CM

## 2023-06-22 DIAGNOSIS — Z88.1: ICD-10-CM

## 2023-06-22 DIAGNOSIS — Z91.041: ICD-10-CM

## 2023-06-22 DIAGNOSIS — G43.909: Primary | ICD-10-CM

## 2023-06-22 DIAGNOSIS — Z88.2: ICD-10-CM

## 2023-06-22 DIAGNOSIS — Z88.5: ICD-10-CM

## 2023-06-22 DIAGNOSIS — H53.149: ICD-10-CM

## 2023-06-22 DIAGNOSIS — Z88.8: ICD-10-CM

## 2023-06-22 DIAGNOSIS — Z79.899: ICD-10-CM

## 2023-06-22 DIAGNOSIS — Z88.6: ICD-10-CM

## 2023-06-22 DIAGNOSIS — Z88.0: ICD-10-CM

## 2023-06-22 LAB
ALBUMIN SERPL BCP-MCNC: 3.8 G/DL (ref 3.4–4.8)
ALT SERPL W P-5'-P-CCNC: 20 U/L (ref 12–78)
ANION GAP SERPL CALCULATED.3IONS-SCNC: 7 MMOL/L (ref 5–15)
AST SERPL W P-5'-P-CCNC: 15 U/L (ref 10–37)
BASOPHILS # BLD AUTO: 0 K/UL (ref 0–0.2)
BASOPHILS NFR BLD AUTO: 0.4 % (ref 0–2)
BILIRUB SERPL-MCNC: 0.3 MG/DL (ref 0–1)
BUN SERPL-MCNC: 11 MG/DL (ref 8–21)
CALCIUM SERPL-MCNC: 9.3 MG/DL (ref 8.4–11)
CHLORIDE SERPL-SCNC: 102 MMOL/L (ref 98–107)
CREAT SERPL-MCNC: 0.72 MG/DL (ref 0.55–1.3)
EOSINOPHIL # BLD AUTO: 0.1 K/UL (ref 0–0.4)
EOSINOPHIL NFR BLD AUTO: 1.5 % (ref 0–4)
ERYTHROCYTE [DISTWIDTH] IN BLOOD BY AUTOMATED COUNT: 15 % (ref 9–15)
GFR SERPL CREATININE-BSD FRML MDRD: 117 ML/MIN (ref 90–?)
GLUCOSE SERPL-MCNC: 84 MG/DL (ref 70–99)
HCT VFR BLD AUTO: 35.1 % (ref 36–48)
HGB BLD-MCNC: 11.5 G/DL (ref 12–16)
LYMPHOCYTES # BLD AUTO: 1.8 K/UL (ref 1–5.5)
LYMPHOCYTES NFR BLD AUTO: 34.3 % (ref 20.5–51.5)
MCH RBC QN AUTO: 29 PG (ref 27–31)
MCHC RBC AUTO-ENTMCNC: 33 % (ref 32–36)
MCV RBC AUTO: 89 FL (ref 79–98)
MONOCYTES # BLD MANUAL: 0.3 K/UL (ref 0–1)
MONOCYTES # BLD MANUAL: 6.3 % (ref 1.7–9.3)
NEUTROPHILS # BLD AUTO: 3 K/UL (ref 1.8–7.7)
NEUTROPHILS NFR BLD AUTO: 57.5 % (ref 40–70)
PHOSPHATE SERPL-MCNC: 4.2 MG/DL (ref 2.7–4.5)
PLATELET # BLD AUTO: 225 K/UL (ref 130–430)
RBC # BLD AUTO: 3.95 MIL/UL (ref 4.2–6.2)
WBC # BLD AUTO: 5.2 K/UL (ref 4.8–10.8)

## 2023-06-22 PROCEDURE — 96361 HYDRATE IV INFUSION ADD-ON: CPT

## 2023-06-22 PROCEDURE — 84100 ASSAY OF PHOSPHORUS: CPT

## 2023-06-22 PROCEDURE — 80053 COMPREHEN METABOLIC PANEL: CPT

## 2023-06-22 PROCEDURE — 36415 COLL VENOUS BLD VENIPUNCTURE: CPT

## 2023-06-22 PROCEDURE — 83735 ASSAY OF MAGNESIUM: CPT

## 2023-06-22 PROCEDURE — 99284 EMERGENCY DEPT VISIT MOD MDM: CPT

## 2023-06-22 PROCEDURE — 96375 TX/PRO/DX INJ NEW DRUG ADDON: CPT

## 2023-06-22 PROCEDURE — 96374 THER/PROPH/DIAG INJ IV PUSH: CPT

## 2023-06-22 PROCEDURE — 85025 COMPLETE CBC W/AUTO DIFF WBC: CPT

## 2023-06-28 ENCOUNTER — HOSPITAL ENCOUNTER (INPATIENT)
Dept: HOSPITAL 4 - SED | Age: 38
LOS: 4 days | Discharge: HOME HEALTH | DRG: 314 | End: 2023-07-02
Attending: INTERNAL MEDICINE | Admitting: INTERNAL MEDICINE
Payer: COMMERCIAL

## 2023-06-28 VITALS — SYSTOLIC BLOOD PRESSURE: 121 MMHG

## 2023-06-28 VITALS — SYSTOLIC BLOOD PRESSURE: 118 MMHG

## 2023-06-28 VITALS — WEIGHT: 105 LBS | BODY MASS INDEX: 17.49 KG/M2 | HEIGHT: 65 IN

## 2023-06-28 DIAGNOSIS — Z79.899: ICD-10-CM

## 2023-06-28 DIAGNOSIS — Z88.8: ICD-10-CM
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
## 2023-06-28 DIAGNOSIS — Z88.0: ICD-10-CM

## 2023-06-28 DIAGNOSIS — G89.4: ICD-10-CM

## 2023-06-28 DIAGNOSIS — C50.919: ICD-10-CM

## 2023-06-28 DIAGNOSIS — Z91.014: ICD-10-CM

## 2023-06-28 DIAGNOSIS — Z88.1: ICD-10-CM

## 2023-06-28 DIAGNOSIS — Z85.3: ICD-10-CM

## 2023-06-28 DIAGNOSIS — A41.50: ICD-10-CM

## 2023-06-28 DIAGNOSIS — D70.3: ICD-10-CM

## 2023-06-28 DIAGNOSIS — Z66: ICD-10-CM

## 2023-06-28 DIAGNOSIS — T80.211A: Primary | ICD-10-CM

## 2023-06-28 DIAGNOSIS — Z88.6: ICD-10-CM

## 2023-06-28 DIAGNOSIS — Z91.041: ICD-10-CM

## 2023-06-28 DIAGNOSIS — D64.9: ICD-10-CM
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
## 2023-06-28 DIAGNOSIS — Z88.2: ICD-10-CM

## 2023-06-28 DIAGNOSIS — F41.9: ICD-10-CM

## 2023-06-28 DIAGNOSIS — E44.1: ICD-10-CM

## 2023-06-28 LAB
ACETONE EXG QL: NEGATIVE
ALBUMIN SERPL BCP-MCNC: 3 G/DL (ref 3.4–4.8)
ALT SERPL W P-5'-P-CCNC: 7 U/L (ref 12–78)
AMYLASE SERPL-CCNC: 64 U/L (ref 0–100)
ANION GAP SERPL CALCULATED.3IONS-SCNC: 13 MMOL/L (ref 5–15)
AST SERPL W P-5'-P-CCNC: 17 U/L (ref 10–37)
BASOPHILS NFR BLD MANUAL: 0 % (ref 0–2)
BILIRUB SERPL-MCNC: 0.2 MG/DL (ref 0–1)
BUN SERPL-MCNC: 14 MG/DL (ref 8–21)
CALCIUM SERPL-MCNC: 8.2 MG/DL (ref 8.4–11)
CHLORIDE SERPL-SCNC: 104 MMOL/L (ref 98–107)
CREAT SERPL-MCNC: 1.02 MG/DL (ref 0.55–1.3)
EOSINOPHIL NFR BLD MANUAL: 1 % (ref 0–7)
ERYTHROCYTE [DISTWIDTH] IN BLOOD BY AUTOMATED COUNT: 14.7 % (ref 9–15)
GFR SERPL CREATININE-BSD FRML MDRD: 78 ML/MIN (ref 90–?)
GLUCOSE SERPL-MCNC: 138 MG/DL (ref 74–106)
HCT VFR BLD AUTO: 28.5 % (ref 36–48)
HGB BLD-MCNC: 9.4 G/DL (ref 12–16)
LIPASE SERPL-CCNC: 155 U/L (ref 73–393)
LYMPHOCYTES NFR BLD MANUAL: 19 % (ref 20–46)
MCH RBC QN AUTO: 30 PG (ref 27–31)
MCHC RBC AUTO-ENTMCNC: 33 % (ref 32–36)
MCV RBC AUTO: 89 FL (ref 79–98)
MONOCYTES # BLD MANUAL: 1 % (ref 0–11)
NEUTS BAND NFR BLD MANUAL: 11 % (ref 0–6)
PLATELET # BLD AUTO: 148 K/UL (ref 130–430)
RBC # BLD AUTO: 3.19 MIL/UL (ref 4.2–6.2)
T4 FREE SERPL-MCNC: 1.1 NG/DL (ref 0.6–1.6)
TSH SERPL DL<=0.05 MIU/L-ACNC: 2.77 UIU/ML (ref 0.34–4.82)
WBC # BLD AUTO: 1.8 K/UL (ref 4.8–10.8)

## 2023-06-28 PROCEDURE — C1751 CATH, INF, PER/CENT/MIDLINE: HCPCS

## 2023-06-28 PROCEDURE — G0378 HOSPITAL OBSERVATION PER HR: HCPCS

## 2023-06-28 RX ADMIN — ONDANSETRON PRN MG: 2 INJECTION INTRAMUSCULAR; INTRAVENOUS at 22:45

## 2023-06-28 RX ADMIN — DIPHENHYDRAMINE HYDROCHLORIDE PRN MG: 50 INJECTION, SOLUTION INTRAMUSCULAR; INTRAVENOUS at 22:46

## 2023-06-28 RX ADMIN — SODIUM CHLORIDE SCH MLS/HR: 9 INJECTION, SOLUTION INTRAVENOUS at 22:53

## 2023-06-28 RX ADMIN — HYDROMORPHONE HYDROCHLORIDE PRN MG: 2 INJECTION INTRAMUSCULAR; INTRAVENOUS; SUBCUTANEOUS at 22:47

## 2023-06-28 NOTE — NUR
Received verbal report from outgoing nurse MAYO Hollis. Received pt resting 
awake and alert with mother at bedside, no s/s of distress noted. Safety 
precautions in place.

## 2023-06-28 NOTE — NUR
Pt has Portcath  on L chest, pt states port was changed recently and the doctor 
told her port should not be used at this time since it needs to rest.

## 2023-06-28 NOTE — NUR
Right IJ IV came out. Dr. Bell made aware. Patient kept asking to use portacath for meds. 
Dr. Bell made aware and explained to staff and patient not to use the port. New orders 
noted and carried out. ER  notified for assistance.

## 2023-06-28 NOTE — NUR
Patient will be admitted to care of Dr Bell.  Admitted to Tele unit.  Will go 
to room 100A.  Belongings list completed.  Complete and up to date summary 
report printed. SBAR report to be given at bedside with opportunity for 
questions.

## 2023-06-28 NOTE — NUR
Admit bed requested 

Patient will be admitted to care of [Arabella].  

Admitted to [Tele] unit.

Diagnosis [Neutropenic Sepsis  ]

Inpatient (Yes or No)  [Yes]

Observation (Yes or No) [No]

Orientation concerns or request close to nursing station  (Yes or No) [No]

Covid Status [n/a]

On vent or bipap [n/a]

Isolation requirements [n/a]

Needs a sitter [n/a]

From Home (Yes or if No enter name of facility) [n/a]

Requires Dialysis (Yes or No) [No] 

Med Rec Completed (Yes of No) [yes]

## 2023-06-28 NOTE — NUR
Patient arrived to room 100A from ED for c/o generalized weakness. Patient said she has 
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 She is not on Chemo 
and was recently off Palliative care Hospice due to "Medicare running out." Patient has left 
chest portacath that is not accessible at this time because "she gets chills whenever it is 
being flushed" and will have it replaced in the next couple days. Dr. Bell came to bedside 
to see patient. She told him she has "7 mm lesion right lung, 5 mm lesion left lung." ANDREA Garcia at bedside as well. Will continue to monitor. Call light within reach.

## 2023-06-28 NOTE — NUR
Pt states port cath works for IVP but not for fluids, Dr Noble notified, Dr Noble 
at bedside assessing the patient, per Dr Noble port cath can be used.

## 2023-06-28 NOTE — NUR
Per Dr Noble Ativan 2 mg needs to be administered IM, patient refusing IM 
medication states she needs Benadryl  in case she gets  a skin rash .

## 2023-06-29 VITALS — SYSTOLIC BLOOD PRESSURE: 114 MMHG

## 2023-06-29 VITALS — SYSTOLIC BLOOD PRESSURE: 94 MMHG

## 2023-06-29 VITALS — SYSTOLIC BLOOD PRESSURE: 98 MMHG

## 2023-06-29 VITALS — SYSTOLIC BLOOD PRESSURE: 121 MMHG

## 2023-06-29 VITALS — SYSTOLIC BLOOD PRESSURE: 120 MMHG

## 2023-06-29 LAB
ALBUMIN SERPL BCP-MCNC: 2.6 G/DL (ref 3.4–4.8)
ALT SERPL W P-5'-P-CCNC: 19 U/L (ref 12–78)
ANION GAP SERPL CALCULATED.3IONS-SCNC: 5 MMOL/L (ref 5–15)
APPEARANCE UR: CLEAR
AST SERPL W P-5'-P-CCNC: 20 U/L (ref 10–37)
BACTERIA URNS QL MICRO: (no result) /HPF
BASOPHILS # BLD AUTO: 0 K/UL (ref 0–0.2)
BASOPHILS NFR BLD AUTO: 0.3 % (ref 0–2)
BILIRUB SERPL-MCNC: 0.2 MG/DL (ref 0–1)
BILIRUB UR QL STRIP: NEGATIVE
BUN SERPL-MCNC: 8 MG/DL (ref 8–21)
CALCIUM SERPL-MCNC: 7.8 MG/DL (ref 8.4–11)
CHLORIDE SERPL-SCNC: 107 MMOL/L (ref 98–107)
COLOR UR: YELLOW
CREAT SERPL-MCNC: 0.74 MG/DL (ref 0.55–1.3)
EOSINOPHIL # BLD AUTO: 0.2 K/UL (ref 0–0.4)
EOSINOPHIL NFR BLD AUTO: 1.9 % (ref 0–4)
ERYTHROCYTE [DISTWIDTH] IN BLOOD BY AUTOMATED COUNT: 14.7 % (ref 9–15)
GFR SERPL CREATININE-BSD FRML MDRD: 113 ML/MIN (ref 90–?)
GLUCOSE SERPL-MCNC: 101 MG/DL (ref 74–106)
GLUCOSE UR STRIP-MCNC: NEGATIVE MG/DL
HCT VFR BLD AUTO: 25.2 % (ref 36–48)
HGB BLD-MCNC: 8.4 G/DL (ref 12–16)
HGB UR QL STRIP: (no result)
INR PPP: 1.2 (ref 0.8–1.2)
KETONES UR STRIP-MCNC: NEGATIVE MG/DL
LEUKOCYTE ESTERASE UR QL STRIP: NEGATIVE
LYMPHOCYTES # BLD AUTO: 2.4 K/UL (ref 1–5.5)
LYMPHOCYTES NFR BLD AUTO: 26.5 % (ref 20.5–51.5)
MCH RBC QN AUTO: 30 PG (ref 27–31)
MCHC RBC AUTO-ENTMCNC: 33 % (ref 32–36)
MCV RBC AUTO: 90 FL (ref 79–98)
MONOCYTES # BLD MANUAL: 0.4 K/UL (ref 0–1)
MONOCYTES # BLD MANUAL: 4 % (ref 1.7–9.3)
NEUTROPHILS # BLD AUTO: 6.1 K/UL (ref 1.8–7.7)
NEUTROPHILS NFR BLD AUTO: 67.3 % (ref 40–70)
NITRITE UR QL STRIP: NEGATIVE
PH UR STRIP: 5.5 [PH] (ref 5–8)
PLATELET # BLD AUTO: 171 K/UL (ref 130–430)
PROT UR QL STRIP: NEGATIVE
PROTHROMBIN TIME: 12 SECS (ref 9.5–12.5)
RBC # BLD AUTO: 2.82 MIL/UL (ref 4.2–6.2)
RBC #/AREA URNS HPF: (no result) /HPF (ref 0–3)
SP GR UR STRIP: 1.02 (ref 1–1.03)
UROBILINOGEN UR STRIP-MCNC: 0.2 MG/DL (ref 0.2–1)
WBC # BLD AUTO: 9.1 K/UL (ref 4.8–10.8)
WBC #/AREA URNS HPF: (no result) /HPF (ref 0–3)

## 2023-06-29 RX ADMIN — HYDROMORPHONE HYDROCHLORIDE PRN MG: 2 INJECTION INTRAMUSCULAR; INTRAVENOUS; SUBCUTANEOUS at 23:13

## 2023-06-29 RX ADMIN — DIPHENHYDRAMINE HYDROCHLORIDE PRN MG: 50 INJECTION, SOLUTION INTRAMUSCULAR; INTRAVENOUS at 23:14

## 2023-06-29 RX ADMIN — HYDROMORPHONE HYDROCHLORIDE PRN MG: 2 INJECTION INTRAMUSCULAR; INTRAVENOUS; SUBCUTANEOUS at 10:55

## 2023-06-29 RX ADMIN — SODIUM CHLORIDE SCH MLS/HR: 9 INJECTION, SOLUTION INTRAVENOUS at 21:41

## 2023-06-29 RX ADMIN — LINEZOLID SCH MLS/HR: 600 INJECTION, SOLUTION INTRAVENOUS at 01:46

## 2023-06-29 RX ADMIN — HYDROMORPHONE HYDROCHLORIDE PRN MG: 2 INJECTION INTRAMUSCULAR; INTRAVENOUS; SUBCUTANEOUS at 06:19

## 2023-06-29 RX ADMIN — DIPHENHYDRAMINE HYDROCHLORIDE PRN MG: 50 INJECTION, SOLUTION INTRAMUSCULAR; INTRAVENOUS at 04:23

## 2023-06-29 RX ADMIN — LIDOCAINE PRN PATCH: 50 PATCH CUTANEOUS at 10:08

## 2023-06-29 RX ADMIN — SODIUM CHLORIDE, SODIUM LACTATE, POTASSIUM CHLORIDE, AND CALCIUM CHLORIDE SCH MLS/HR: 600; 310; 30; 20 INJECTION, SOLUTION INTRAVENOUS at 13:56

## 2023-06-29 RX ADMIN — LINEZOLID SCH MLS/HR: 600 INJECTION, SOLUTION INTRAVENOUS at 09:26

## 2023-06-29 RX ADMIN — ONDANSETRON PRN MG: 2 INJECTION INTRAMUSCULAR; INTRAVENOUS at 06:19

## 2023-06-29 RX ADMIN — DIPHENHYDRAMINE HYDROCHLORIDE PRN MG: 50 INJECTION, SOLUTION INTRAMUSCULAR; INTRAVENOUS at 18:12

## 2023-06-29 RX ADMIN — FAMOTIDINE SCH MG: 10 INJECTION INTRAVENOUS at 09:27

## 2023-06-29 RX ADMIN — FAMOTIDINE SCH MG: 10 INJECTION INTRAVENOUS at 21:41

## 2023-06-29 RX ADMIN — SODIUM CHLORIDE SCH MLS/HR: 9 INJECTION, SOLUTION INTRAVENOUS at 09:31

## 2023-06-29 RX ADMIN — SODIUM CHLORIDE SCH MLS/HR: 9 INJECTION, SOLUTION INTRAVENOUS at 20:11

## 2023-06-29 RX ADMIN — HYDROMORPHONE HYDROCHLORIDE PRN MG: 2 INJECTION INTRAMUSCULAR; INTRAVENOUS; SUBCUTANEOUS at 00:46

## 2023-06-29 RX ADMIN — SODIUM CHLORIDE, SODIUM LACTATE, POTASSIUM CHLORIDE, AND CALCIUM CHLORIDE SCH MLS/HR: 600; 310; 30; 20 INJECTION, SOLUTION INTRAVENOUS at 23:00

## 2023-06-29 RX ADMIN — DIPHENHYDRAMINE HYDROCHLORIDE PRN MG: 50 INJECTION, SOLUTION INTRAMUSCULAR; INTRAVENOUS at 10:55

## 2023-06-29 RX ADMIN — HYDROMORPHONE HYDROCHLORIDE PRN MG: 2 INJECTION INTRAMUSCULAR; INTRAVENOUS; SUBCUTANEOUS at 15:47

## 2023-06-29 NOTE — NUR
Patient verbalized she "has labored breathing" and is requesting 2L NC. Communicated with 
Dr. Bell. New orders noted and carried out.

## 2023-06-29 NOTE — NUR
OPENING NOTES;

PT RESTING IN BED, REVERSE ISOLATION PRECAUTION CONTINUED. RIJ CENTRAL LINE REMAIN INTACT 
AND CLEAN. NO S/S ACUTE DISTRESS OR SOB NOTED. BED IS LOCKED AND AT LOW POSITION ENCOURAGED 
PT TO USE CALL LIGHT FOR ASSISTANCE. PROVIDED WARM BLANKETS AS REQUESTED. WILL CONT TO 
MONITOR FOR ANY CHANGES

## 2023-06-29 NOTE — NUR
NOTES;

SD REQUESTS INCREASED DOSE OF ATIVAN, DECADRON FOR MUSCLE PAIN/STIFFNESS AND PEPCID FOR 
[id number]. WILL PAGE DOCTOR REGARDING PT'S REQUEST.

## 2023-06-29 NOTE — NUR
NOTES;

PLACE NEW DRESSING TO LEFT CHEST PORT BY USING STERILE TECHNIQUE. COVERED WITH TRANSPARENT 
DRESSING. PT TOLERATED WELL.

## 2023-06-29 NOTE — NUR
Spoke w/ Mariely at Dr Alfaro's office in [place], the patient's vascular surgeon, 
[phone]. She informed me the patient is scheduled for leonides cath removal on Monday July 
3 at 2PM. She needs to be at [facility] in [place] at 11AM on Monday July 3. She was 
sent pre-op labs/CXR/EKG as requested.

## 2023-06-29 NOTE — NUR
ROUNDS

PATIENT ASLEEP AT THIS TIME, FAMILY AT THE BEDSIDE, VITALS STABLE. NO SIGNS OF PAIN AT THIS 
TIME. ASSESSMENT DONE AND DOCUMENTED. SEE FLOWSHEET. NEEDS ATTENDED TO. CALL LIGHT PLACED 
WITHIN REACH.

## 2023-06-29 NOTE — NUR
CLOSING NOTE;

PT RESTING IN BED, DROWSY. IVF RUNNING AS ORDERED. IV SITE REMAIN INTACT AND PATENT. MADE PT 
AWARE REGARDING HIS MEDICATION REQUESTS EARLIER. BED IS LOCKED AND AT LOW POSITION. 
ENCOURAGED PT TO USE CANDLELIGHT ENDORSE CARE TO NIGHT SHIFT NURSE.

## 2023-06-29 NOTE — NUR
Patient wanted pain medicine after procedure. Spoke with Dr. Bell. New orders noted and 
carried out for one time dose of pain medicine.

## 2023-06-29 NOTE — NUR
Consultation Paged 





Reason for Consult: Sepsis

Was consult called: Y

Person who was notified: Betty

Consulting Physician: Dr. Liang

Ordering Physician: Dr. Bell

## 2023-06-30 VITALS — SYSTOLIC BLOOD PRESSURE: 111 MMHG

## 2023-06-30 VITALS — SYSTOLIC BLOOD PRESSURE: 120 MMHG

## 2023-06-30 VITALS — SYSTOLIC BLOOD PRESSURE: 110 MMHG

## 2023-06-30 VITALS — SYSTOLIC BLOOD PRESSURE: 129 MMHG

## 2023-06-30 VITALS — SYSTOLIC BLOOD PRESSURE: 113 MMHG

## 2023-06-30 LAB
ALBUMIN SERPL BCP-MCNC: 2.9 G/DL (ref 3.4–4.8)
ALT SERPL W P-5'-P-CCNC: 21 U/L (ref 12–78)
ANION GAP SERPL CALCULATED.3IONS-SCNC: 6 MMOL/L (ref 5–15)
AST SERPL W P-5'-P-CCNC: 17 U/L (ref 10–37)
BASOPHILS # BLD AUTO: 0 K/UL (ref 0–0.2)
BASOPHILS NFR BLD AUTO: 0.3 % (ref 0–2)
BILIRUB SERPL-MCNC: 0.2 MG/DL (ref 0–1)
BUN SERPL-MCNC: 3 MG/DL (ref 8–21)
CALCIUM SERPL-MCNC: 8.7 MG/DL (ref 8.4–11)
CHLORIDE SERPL-SCNC: 103 MMOL/L (ref 98–107)
CREAT SERPL-MCNC: 0.75 MG/DL (ref 0.55–1.3)
EOSINOPHIL # BLD AUTO: 0.2 K/UL (ref 0–0.4)
EOSINOPHIL NFR BLD AUTO: 2.5 % (ref 0–4)
ERYTHROCYTE [DISTWIDTH] IN BLOOD BY AUTOMATED COUNT: 14.6 % (ref 9–15)
GFR SERPL CREATININE-BSD FRML MDRD: 111 ML/MIN (ref 90–?)
GLUCOSE SERPL-MCNC: 119 MG/DL (ref 74–106)
HCT VFR BLD AUTO: 27.8 % (ref 36–48)
HGB BLD-MCNC: 9.3 G/DL (ref 12–16)
LYMPHOCYTES # BLD AUTO: 1.8 K/UL (ref 1–5.5)
LYMPHOCYTES NFR BLD AUTO: 29.1 % (ref 20.5–51.5)
MCH RBC QN AUTO: 29 PG (ref 27–31)
MCHC RBC AUTO-ENTMCNC: 33 % (ref 32–36)
MCV RBC AUTO: 88 FL (ref 79–98)
MONOCYTES # BLD MANUAL: 0.3 K/UL (ref 0–1)
MONOCYTES # BLD MANUAL: 5.3 % (ref 1.7–9.3)
NEUTROPHILS # BLD AUTO: 3.9 K/UL (ref 1.8–7.7)
NEUTROPHILS NFR BLD AUTO: 62.8 % (ref 40–70)
PLATELET # BLD AUTO: 194 K/UL (ref 130–430)
RBC # BLD AUTO: 3.15 MIL/UL (ref 4.2–6.2)
TIBC SERPL-MCNC: 283 UG/DL (ref 250–450)
WBC # BLD AUTO: 6.3 K/UL (ref 4.8–10.8)

## 2023-06-30 RX ADMIN — FAMOTIDINE SCH MG: 10 INJECTION INTRAVENOUS at 12:19

## 2023-06-30 RX ADMIN — HYDROMORPHONE HYDROCHLORIDE PRN MG: 2 INJECTION INTRAMUSCULAR; INTRAVENOUS; SUBCUTANEOUS at 12:25

## 2023-06-30 RX ADMIN — DIPHENHYDRAMINE HYDROCHLORIDE PRN MG: 50 INJECTION, SOLUTION INTRAMUSCULAR; INTRAVENOUS at 20:21

## 2023-06-30 RX ADMIN — SODIUM CHLORIDE SCH MLS/HR: 9 INJECTION, SOLUTION INTRAVENOUS at 19:10

## 2023-06-30 RX ADMIN — DIPHENHYDRAMINE HYDROCHLORIDE PRN MG: 50 INJECTION, SOLUTION INTRAMUSCULAR; INTRAVENOUS at 12:18

## 2023-06-30 RX ADMIN — DIPHENHYDRAMINE HYDROCHLORIDE PRN MG: 50 INJECTION, SOLUTION INTRAMUSCULAR; INTRAVENOUS at 16:12

## 2023-06-30 RX ADMIN — HYDROMORPHONE HYDROCHLORIDE PRN MG: 2 INJECTION INTRAMUSCULAR; INTRAVENOUS; SUBCUTANEOUS at 20:21

## 2023-06-30 RX ADMIN — DIPHENHYDRAMINE HYDROCHLORIDE PRN MG: 50 INJECTION, SOLUTION INTRAMUSCULAR; INTRAVENOUS at 03:22

## 2023-06-30 RX ADMIN — SODIUM CHLORIDE SCH MLS/HR: 9 INJECTION, SOLUTION INTRAVENOUS at 20:57

## 2023-06-30 RX ADMIN — HYDROMORPHONE HYDROCHLORIDE PRN MG: 2 INJECTION INTRAMUSCULAR; INTRAVENOUS; SUBCUTANEOUS at 16:13

## 2023-06-30 RX ADMIN — FAMOTIDINE SCH MG: 10 INJECTION INTRAVENOUS at 20:44

## 2023-06-30 RX ADMIN — HYDROMORPHONE HYDROCHLORIDE PRN MG: 2 INJECTION INTRAMUSCULAR; INTRAVENOUS; SUBCUTANEOUS at 07:57

## 2023-06-30 RX ADMIN — HYDROMORPHONE HYDROCHLORIDE PRN MG: 2 INJECTION INTRAMUSCULAR; INTRAVENOUS; SUBCUTANEOUS at 03:22

## 2023-06-30 RX ADMIN — DIPHENHYDRAMINE HYDROCHLORIDE PRN MG: 50 INJECTION, SOLUTION INTRAMUSCULAR; INTRAVENOUS at 08:00

## 2023-06-30 NOTE — NUR
Dietitian Recommendations



* Consider advance to Regular diet

LP, MS, RD



Please refer to Nutrition Assessment for details.

-------------------------------------------------------------------------------

Addendum: 06/30/23 at 1634 by Malu [address] RD

-------------------------------------------------------------------------------

Amended: Links added.

## 2023-06-30 NOTE — NUR
MSW met with pt. at bedside with Charge Rn and Admitting Dr. Pt. spoke slow and in a very 
low voice. Pt. stated she had been on hospice for her breast cancer and had been diagnosed 
in 2021. Pt. also has lupus and was given steroids to treat. Pt. stated this made the cancer 
appear as if it was getting better so she was taken off hospice and put on palliative. 
During this interview, pt. would not answer specific questions, wanted to tell entire health 
history and could not remember specifics such as why port was place, who is oncologist, who 
is hospice company.... until redirected, question asked several times. 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 



Pt. finally stated the following. She had been with [facility] and then [facility]. She has an apt. with Dr. Alfaro from [facility] to have infected port 
removed. CM looked up name for Dr. Alfaro, [phone] and spoke to the his office who 
confirmed pt. has a scheduled procedure to have port removed. MSW provided pt. with a 
business card and asked pt. to call if she had any questions or concerns.

## 2023-06-30 NOTE — NUR
NOTES

PATIENT AWAKE, VITALS STABLE, CENTRAL LINE DRESSING CHANGE DONE AS ASKED BY PATIENT PER 
STERILE PROCEDURE. NEEDS ATTENDED TO. CALL LIGHT WITHIN REACH.

## 2023-06-30 NOTE — NUR
NOTES

PATIENT AWAKE,WANTS STEROID SAYING BENADRYL IS NOT WORKING ON HER ITCHINESS AND SHE ALSO 
CLAIMED THAT SHE HIT HER LEFT SIDE OF HER FACE IN THE BATHROOM. ASSESSED AND NOTED NO 
SWELLING, NO BRUISING ON HER FACE. STILL WAITING FOR 'S RETURN CALL. WILL CONTINUE 
TO MONITOR.

## 2023-07-01 VITALS — SYSTOLIC BLOOD PRESSURE: 117 MMHG

## 2023-07-01 VITALS — SYSTOLIC BLOOD PRESSURE: 102 MMHG

## 2023-07-01 VITALS — SYSTOLIC BLOOD PRESSURE: 110 MMHG

## 2023-07-01 VITALS — SYSTOLIC BLOOD PRESSURE: 128 MMHG

## 2023-07-01 LAB
FERRITIN: 73 NG/ML (ref 15–150)
FOLATE (FOLIC ACID): 9 NG/ML (ref 3–?)
VIT B12 SERPL-MCNC: 395 PG/ML (ref 232–1245)

## 2023-07-01 RX ADMIN — SODIUM CHLORIDE, SODIUM LACTATE, POTASSIUM CHLORIDE, AND CALCIUM CHLORIDE SCH MLS/HR: 600; 310; 30; 20 INJECTION, SOLUTION INTRAVENOUS at 12:37

## 2023-07-01 RX ADMIN — SODIUM CHLORIDE, SODIUM LACTATE, POTASSIUM CHLORIDE, AND CALCIUM CHLORIDE SCH MLS/HR: 600; 310; 30; 20 INJECTION, SOLUTION INTRAVENOUS at 22:00

## 2023-07-01 RX ADMIN — HYDROMORPHONE HYDROCHLORIDE PRN MG: 2 INJECTION INTRAMUSCULAR; INTRAVENOUS; SUBCUTANEOUS at 12:32

## 2023-07-01 RX ADMIN — FAMOTIDINE SCH MG: 10 INJECTION INTRAVENOUS at 08:35

## 2023-07-01 RX ADMIN — DIPHENHYDRAMINE HYDROCHLORIDE PRN MG: 50 INJECTION, SOLUTION INTRAMUSCULAR; INTRAVENOUS at 20:49

## 2023-07-01 RX ADMIN — LIDOCAINE PRN PATCH: 50 PATCH CUTANEOUS at 16:43

## 2023-07-01 RX ADMIN — DIPHENHYDRAMINE HYDROCHLORIDE PRN MG: 50 INJECTION, SOLUTION INTRAMUSCULAR; INTRAVENOUS at 00:25

## 2023-07-01 RX ADMIN — HYDROMORPHONE HYDROCHLORIDE PRN MG: 2 INJECTION INTRAMUSCULAR; INTRAVENOUS; SUBCUTANEOUS at 04:36

## 2023-07-01 RX ADMIN — DIPHENHYDRAMINE HYDROCHLORIDE PRN MG: 50 INJECTION, SOLUTION INTRAMUSCULAR; INTRAVENOUS at 08:36

## 2023-07-01 RX ADMIN — DIPHENHYDRAMINE HYDROCHLORIDE PRN MG: 50 INJECTION, SOLUTION INTRAMUSCULAR; INTRAVENOUS at 16:31

## 2023-07-01 RX ADMIN — DIPHENHYDRAMINE HYDROCHLORIDE PRN MG: 50 INJECTION, SOLUTION INTRAMUSCULAR; INTRAVENOUS at 04:36

## 2023-07-01 RX ADMIN — HYDROMORPHONE HYDROCHLORIDE PRN MG: 2 INJECTION INTRAMUSCULAR; INTRAVENOUS; SUBCUTANEOUS at 08:38

## 2023-07-01 RX ADMIN — HYDROMORPHONE HYDROCHLORIDE PRN MG: 2 INJECTION INTRAMUSCULAR; INTRAVENOUS; SUBCUTANEOUS at 16:32

## 2023-07-01 RX ADMIN — HYDROMORPHONE HYDROCHLORIDE PRN MG: 2 INJECTION INTRAMUSCULAR; INTRAVENOUS; SUBCUTANEOUS at 00:30

## 2023-07-01 RX ADMIN — FAMOTIDINE SCH MG: 10 INJECTION INTRAVENOUS at 21:00

## 2023-07-01 RX ADMIN — HYDROMORPHONE HYDROCHLORIDE PRN MG: 2 INJECTION INTRAMUSCULAR; INTRAVENOUS; SUBCUTANEOUS at 20:50

## 2023-07-01 RX ADMIN — DIPHENHYDRAMINE HYDROCHLORIDE PRN MG: 50 INJECTION, SOLUTION INTRAMUSCULAR; INTRAVENOUS at 12:32

## 2023-07-01 NOTE — NUR
ASSUMED CARE OF PATIENT, WALKING IN HALLWAY, AGITATED. NURSE ACCOMPANIED PATIENT BACK TO 
BED. EMOTIONAL SUPPORT PROVIDED. VSS, AFEBRILE. ASSESSMENT COMPLETED. SEE CHART

## 2023-07-01 NOTE — NUR
Patient is alert, oriented, she has been extremely demanding during the shift and needy. 
Patient ask for Dilaudid and Benadryl every four hours and before it is due. Patient 
requested an enema at nearly 0100 AM, she states she was not constipated but feels 
uncomfortable with her bowels. Dr. Bell was called, he ordered fleets enema X1, and PRN 
Colace for constipation, patient did have large results. No emesis noted, vitals are stable. 
Patient was also medicated with IV Ativan 1mg IV, it is ordered Q 6 hours; she was less 
anxious after taking the medication. Patient's mother remained with patient all night, 
patient was checked frequently. Safety maintained.

## 2023-07-01 NOTE — NUR
PAGED DR. PALOMINO @0040.

RN RON REQUESTED TO CALL TO DR. PALOMINO FOR NEW ORDER. NOTIFIED JAVED WHO IS ANSWERING 
.

## 2023-07-02 VITALS — SYSTOLIC BLOOD PRESSURE: 109 MMHG

## 2023-07-02 VITALS — SYSTOLIC BLOOD PRESSURE: 105 MMHG

## 2023-07-02 VITALS — SYSTOLIC BLOOD PRESSURE: 102 MMHG

## 2023-07-02 RX ADMIN — FAMOTIDINE SCH MG: 10 INJECTION INTRAVENOUS at 09:00

## 2023-07-02 RX ADMIN — DIPHENHYDRAMINE HYDROCHLORIDE PRN MG: 50 INJECTION, SOLUTION INTRAMUSCULAR; INTRAVENOUS at 14:40

## 2023-07-02 RX ADMIN — DIPHENHYDRAMINE HYDROCHLORIDE PRN MG: 50 INJECTION, SOLUTION INTRAMUSCULAR; INTRAVENOUS at 02:41

## 2023-07-02 RX ADMIN — SODIUM CHLORIDE, SODIUM LACTATE, POTASSIUM CHLORIDE, AND CALCIUM CHLORIDE SCH MLS/HR: 600; 310; 30; 20 INJECTION, SOLUTION INTRAVENOUS at 08:00

## 2023-07-02 RX ADMIN — HYDROMORPHONE HYDROCHLORIDE PRN MG: 2 INJECTION INTRAMUSCULAR; INTRAVENOUS; SUBCUTANEOUS at 10:39

## 2023-07-02 RX ADMIN — DIPHENHYDRAMINE HYDROCHLORIDE PRN MG: 50 INJECTION, SOLUTION INTRAMUSCULAR; INTRAVENOUS at 06:36

## 2023-07-02 RX ADMIN — HYDROMORPHONE HYDROCHLORIDE PRN MG: 2 INJECTION INTRAMUSCULAR; INTRAVENOUS; SUBCUTANEOUS at 00:56

## 2023-07-02 RX ADMIN — HYDROMORPHONE HYDROCHLORIDE PRN MG: 2 INJECTION INTRAMUSCULAR; INTRAVENOUS; SUBCUTANEOUS at 02:41

## 2023-07-02 RX ADMIN — HYDROMORPHONE HYDROCHLORIDE PRN MG: 2 INJECTION INTRAMUSCULAR; INTRAVENOUS; SUBCUTANEOUS at 14:40

## 2023-07-02 RX ADMIN — HYDROMORPHONE HYDROCHLORIDE PRN MG: 2 INJECTION INTRAMUSCULAR; INTRAVENOUS; SUBCUTANEOUS at 06:37

## 2023-07-02 RX ADMIN — DIPHENHYDRAMINE HYDROCHLORIDE PRN MG: 50 INJECTION, SOLUTION INTRAMUSCULAR; INTRAVENOUS at 10:39

## 2023-07-02 NOTE — NUR
REPORT GIVEN TO ONCOMING RN. PATIENT WAS MEDICATED AS ORDERED FOR PAIN TO ABD AND LOWER 
BACK. SEE CHART.

## 2023-07-02 NOTE — NUR
pt d/c to home @1700. md aware central line in place for surgical procedure tomorrow. vss. 
pt ambulatory no c/o pain or SOB.

## 2023-07-03 NOTE — NUR
Spoke to Rena at [facility]-She has received order for care/IV ABX-she is 
processing the order and will carry out the orders

## 2023-07-03 NOTE — NUR
Spoke w/ Rena at [facility]- the patient is a "do not admit" due to previous 
non-compliance issues. DC planner will pursue other infusion companies for the patient for 
IV ABX.

## 2023-07-03 NOTE — NUR
SENT PACKET TO MARY AT [facility] FOR [place] FAX#[phone] iv

## 2023-07-03 NOTE — NUR
I SENT THE PACKET OVER TO [facility] FOR REVIEW TO SEE IF THEY WILL SERVICE . WILL 
SEND MORE PACKETS OUT TO OTHER INFUSION COMPANIES

## 2023-07-03 NOTE — NUR
SENT PACKET OVER TO [facility] THEY SAID NO TO LACK OF STAFF IN THE AREA. [facility] IS 
STILLING WORKING ON IT TALK TO ISA[facility] [phone]. SHOULD HAVE A ANSWER IN THE AM. ALSO 
SENT IT TO PUJA TAYLOR AT FAX# [phone] AND DIRECT # [phone]

## 2023-07-04 LAB — CANCER AG27-29 SERPL-ACNC: <9 U/ML (ref 0–38.6)

## 2023-07-10 ENCOUNTER — HOSPITAL ENCOUNTER (EMERGENCY)
Dept: HOSPITAL 4 - SED | Age: 38
Discharge: HOME | End: 2023-07-10
Payer: COMMERCIAL

## 2023-07-10 VITALS
TEMPERATURE: 97.8 F | SYSTOLIC BLOOD PRESSURE: 110 MMHG | RESPIRATION RATE: 16 BRPM | OXYGEN SATURATION: 99 % | HEART RATE: 100 BPM

## 2023-07-10 VITALS
RESPIRATION RATE: 16 BRPM | HEART RATE: 100 BPM | SYSTOLIC BLOOD PRESSURE: 110 MMHG | TEMPERATURE: 97.8 F | OXYGEN SATURATION: 99 %

## 2023-07-10 VITALS — WEIGHT: 115 LBS | HEIGHT: 65 IN | BODY MASS INDEX: 19.16 KG/M2

## 2023-07-10 DIAGNOSIS — Z91.040: ICD-10-CM

## 2023-07-10 DIAGNOSIS — Z88.5: ICD-10-CM

## 2023-07-10 DIAGNOSIS — Z88.1: ICD-10-CM

## 2023-07-10 DIAGNOSIS — R42: ICD-10-CM

## 2023-07-10 DIAGNOSIS — Z88.0: ICD-10-CM

## 2023-07-10 DIAGNOSIS — Z79.899: ICD-10-CM

## 2023-07-10 DIAGNOSIS — Z88.2: ICD-10-CM

## 2023-07-10 DIAGNOSIS — R06.02: ICD-10-CM

## 2023-07-10 DIAGNOSIS — K21.9: ICD-10-CM

## 2023-07-10 DIAGNOSIS — R53.1: Primary | ICD-10-CM

## 2023-07-10 DIAGNOSIS — Z88.8: ICD-10-CM

## 2023-07-10 DIAGNOSIS — Z85.3: ICD-10-CM

## 2023-07-10 LAB
ALBUMIN SERPL BCP-MCNC: 4.2 G/DL (ref 3.4–4.8)
ALT SERPL W P-5'-P-CCNC: 25 U/L (ref 12–78)
ANION GAP SERPL CALCULATED.3IONS-SCNC: 10 MMOL/L (ref 5–15)
APPEARANCE UR: (no result)
AST SERPL W P-5'-P-CCNC: 25 U/L (ref 10–37)
BACTERIA URNS QL MICRO: (no result) /HPF
BASOPHILS # BLD AUTO: 0 K/UL (ref 0–0.2)
BASOPHILS NFR BLD AUTO: 0.4 % (ref 0–2)
BILIRUB SERPL-MCNC: 0.6 MG/DL (ref 0–1)
BILIRUB UR QL STRIP: NEGATIVE
BUN SERPL-MCNC: 20 MG/DL (ref 8–21)
CALCIUM SERPL-MCNC: 9.2 MG/DL (ref 8.4–11)
CHLORIDE SERPL-SCNC: 101 MMOL/L (ref 98–107)
COLOR UR: YELLOW
CREAT SERPL-MCNC: 0.8 MG/DL (ref 0.55–1.3)
EOSINOPHIL # BLD AUTO: 0 K/UL (ref 0–0.4)
EOSINOPHIL NFR BLD AUTO: 0.5 % (ref 0–4)
ERYTHROCYTE [DISTWIDTH] IN BLOOD BY AUTOMATED COUNT: 15.3 % (ref 9–15)
GFR SERPL CREATININE-BSD FRML MDRD: 103 ML/MIN (ref 90–?)
GLUCOSE SERPL-MCNC: 120 MG/DL (ref 74–106)
GLUCOSE UR STRIP-MCNC: NEGATIVE MG/DL
HCT VFR BLD AUTO: 39.1 % (ref 36–48)
HGB BLD-MCNC: 12.7 G/DL (ref 12–16)
HGB UR QL STRIP: NEGATIVE
HYALINE CASTS URNS QL MICRO: (no result) /LPF
KETONES UR STRIP-MCNC: NEGATIVE MG/DL
LEUKOCYTE ESTERASE UR QL STRIP: NEGATIVE
LYMPHOCYTES # BLD AUTO: 2.1 K/UL (ref 1–5.5)
LYMPHOCYTES NFR BLD AUTO: 30.6 % (ref 20.5–51.5)
MCH RBC QN AUTO: 29 PG (ref 27–31)
MCHC RBC AUTO-ENTMCNC: 33 % (ref 32–36)
MCV RBC AUTO: 88 FL (ref 79–98)
MONOCYTES # BLD MANUAL: 0.2 K/UL (ref 0–1)
MONOCYTES # BLD MANUAL: 2.9 % (ref 1.7–9.3)
MUCOUS THREADS URNS QL MICRO: (no result) /LPF
NEUTROPHILS # BLD AUTO: 4.5 K/UL (ref 1.8–7.7)
NEUTROPHILS NFR BLD AUTO: 65.6 % (ref 40–70)
NITRITE UR QL STRIP: NEGATIVE
PH UR STRIP: 5.5 [PH] (ref 5–8)
PLATELET # BLD AUTO: 373 K/UL (ref 130–430)
PROT UR QL STRIP: (no result)
RBC # BLD AUTO: 4.46 MIL/UL (ref 4.2–6.2)
RBC #/AREA URNS HPF: (no result) /HPF (ref 0–3)
SP GR UR STRIP: >=1.03 (ref 1–1.03)
UROBILINOGEN UR STRIP-MCNC: 0.2 MG/DL (ref 0.2–1)
WBC # BLD AUTO: 6.9 K/UL (ref 4.8–10.8)
WBC #/AREA URNS HPF: (no result) /HPF (ref 0–3)

## 2023-07-30 ENCOUNTER — HOSPITAL ENCOUNTER (EMERGENCY)
Dept: HOSPITAL 4 - SED | Age: 38
Discharge: HOME | End: 2023-07-30
Payer: COMMERCIAL

## 2023-07-30 VITALS
SYSTOLIC BLOOD PRESSURE: 122 MMHG | HEART RATE: 102 BPM | TEMPERATURE: 98.4 F | RESPIRATION RATE: 18 BRPM | OXYGEN SATURATION: 96 %

## 2023-07-30 VITALS
RESPIRATION RATE: 18 BRPM | TEMPERATURE: 97.3 F | OXYGEN SATURATION: 97 % | SYSTOLIC BLOOD PRESSURE: 133 MMHG | HEART RATE: 74 BPM

## 2023-07-30 VITALS — BODY MASS INDEX: 19.16 KG/M2 | WEIGHT: 115 LBS | HEIGHT: 65 IN

## 2023-07-30 DIAGNOSIS — R30.0: ICD-10-CM

## 2023-07-30 DIAGNOSIS — R11.10: ICD-10-CM

## 2023-07-30 DIAGNOSIS — Z88.5: ICD-10-CM

## 2023-07-30 DIAGNOSIS — Z88.1: ICD-10-CM

## 2023-07-30 DIAGNOSIS — R10.9: Primary | ICD-10-CM

## 2023-07-30 DIAGNOSIS — Z88.2: ICD-10-CM

## 2023-07-30 DIAGNOSIS — Z91.018: ICD-10-CM

## 2023-07-30 DIAGNOSIS — Z79.899: ICD-10-CM

## 2023-07-30 DIAGNOSIS — Z88.0: ICD-10-CM

## 2023-07-30 DIAGNOSIS — Z91.041: ICD-10-CM

## 2023-07-30 DIAGNOSIS — Z85.3: ICD-10-CM

## 2023-07-30 DIAGNOSIS — Z88.6: ICD-10-CM

## 2023-07-30 DIAGNOSIS — K21.9: ICD-10-CM

## 2023-07-30 LAB
ALBUMIN SERPL BCP-MCNC: 3.6 G/DL (ref 3.4–4.8)
ALT SERPL W P-5'-P-CCNC: 13 U/L (ref 12–78)
ANION GAP SERPL CALCULATED.3IONS-SCNC: 8 MMOL/L (ref 5–15)
APPEARANCE UR: CLEAR
AST SERPL W P-5'-P-CCNC: 25 U/L (ref 10–37)
BACTERIA URNS QL MICRO: (no result) /HPF
BASOPHILS # BLD AUTO: 0 K/UL (ref 0–0.2)
BASOPHILS NFR BLD AUTO: 0.4 % (ref 0–2)
BILIRUB SERPL-MCNC: 0.4 MG/DL (ref 0–1)
BILIRUB UR QL STRIP: NEGATIVE
BUN SERPL-MCNC: 10 MG/DL (ref 8–21)
CALCIUM SERPL-MCNC: 8.6 MG/DL (ref 8.4–11)
CHLORIDE SERPL-SCNC: 105 MMOL/L (ref 98–107)
COLOR UR: YELLOW
CREAT SERPL-MCNC: 0.73 MG/DL (ref 0.55–1.3)
EOSINOPHIL # BLD AUTO: 0 K/UL (ref 0–0.4)
EOSINOPHIL NFR BLD AUTO: 0.9 % (ref 0–4)
ERYTHROCYTE [DISTWIDTH] IN BLOOD BY AUTOMATED COUNT: 15 % (ref 9–15)
GFR SERPL CREATININE-BSD FRML MDRD: 114 ML/MIN (ref 90–?)
GLUCOSE SERPL-MCNC: 125 MG/DL (ref 74–106)
GLUCOSE UR STRIP-MCNC: NEGATIVE MG/DL
HCT VFR BLD AUTO: 33.3 % (ref 36–48)
HGB BLD-MCNC: 10.7 G/DL (ref 12–16)
HGB UR QL STRIP: (no result)
KETONES UR STRIP-MCNC: NEGATIVE MG/DL
LEUKOCYTE ESTERASE UR QL STRIP: NEGATIVE
LYMPHOCYTES # BLD AUTO: 1.7 K/UL (ref 1–5.5)
LYMPHOCYTES NFR BLD AUTO: 33.8 % (ref 20.5–51.5)
MCH RBC QN AUTO: 28 PG (ref 27–31)
MCHC RBC AUTO-ENTMCNC: 32 % (ref 32–36)
MCV RBC AUTO: 88 FL (ref 79–98)
MONOCYTES # BLD MANUAL: 0.2 K/UL (ref 0–1)
MONOCYTES # BLD MANUAL: 3.9 % (ref 1.7–9.3)
MUCOUS THREADS URNS QL MICRO: (no result) /LPF
NEUTROPHILS # BLD AUTO: 3 K/UL (ref 1.8–7.7)
NEUTROPHILS NFR BLD AUTO: 61 % (ref 40–70)
NITRITE UR QL STRIP: NEGATIVE
PH UR STRIP: 6 [PH] (ref 5–8)
PLATELET # BLD AUTO: 227 K/UL (ref 130–430)
PROT UR QL STRIP: NEGATIVE
RBC # BLD AUTO: 3.8 MIL/UL (ref 4.2–6.2)
RBC #/AREA URNS HPF: (no result) /HPF (ref 0–3)
SP GR UR STRIP: 1.02 (ref 1–1.03)
UROBILINOGEN UR STRIP-MCNC: 0.2 MG/DL (ref 0.2–1)
WBC # BLD AUTO: 5 K/UL (ref 4.8–10.8)
WBC #/AREA URNS HPF: (no result) /HPF (ref 0–3)

## 2023-07-30 PROCEDURE — 96375 TX/PRO/DX INJ NEW DRUG ADDON: CPT

## 2023-07-30 PROCEDURE — 81000 URINALYSIS NONAUTO W/SCOPE: CPT

## 2023-07-30 PROCEDURE — 36415 COLL VENOUS BLD VENIPUNCTURE: CPT

## 2023-07-30 PROCEDURE — 96361 HYDRATE IV INFUSION ADD-ON: CPT

## 2023-07-30 PROCEDURE — 80053 COMPREHEN METABOLIC PANEL: CPT

## 2023-07-30 PROCEDURE — 99285 EMERGENCY DEPT VISIT HI MDM: CPT

## 2023-07-30 PROCEDURE — 76376 3D RENDER W/INTRP POSTPROCES: CPT

## 2023-07-30 PROCEDURE — 96374 THER/PROPH/DIAG INJ IV PUSH: CPT

## 2023-07-30 PROCEDURE — 85025 COMPLETE CBC W/AUTO DIFF WBC: CPT

## 2023-07-30 PROCEDURE — 74176 CT ABD & PELVIS W/O CONTRAST: CPT

## 2023-08-02 ENCOUNTER — HOSPITAL ENCOUNTER (EMERGENCY)
Dept: HOSPITAL 4 - SED | Age: 38
Discharge: HOME | End: 2023-08-02
Payer: COMMERCIAL

## 2023-08-02 VITALS
SYSTOLIC BLOOD PRESSURE: 135 MMHG | RESPIRATION RATE: 17 BRPM | TEMPERATURE: 98.3 F | OXYGEN SATURATION: 97 % | HEART RATE: 74 BPM

## 2023-08-02 VITALS
HEIGHT: 65 IN | WEIGHT: 125 LBS | OXYGEN SATURATION: 96 % | BODY MASS INDEX: 20.83 KG/M2 | RESPIRATION RATE: 18 BRPM | HEART RATE: 89 BPM | TEMPERATURE: 97.2 F | SYSTOLIC BLOOD PRESSURE: 128 MMHG

## 2023-08-02 DIAGNOSIS — Z91.018: ICD-10-CM

## 2023-08-02 DIAGNOSIS — G89.29: Primary | ICD-10-CM

## 2023-08-02 DIAGNOSIS — Z88.2: ICD-10-CM

## 2023-08-02 DIAGNOSIS — Z91.040: ICD-10-CM

## 2023-08-02 DIAGNOSIS — Z79.899: ICD-10-CM

## 2023-08-02 DIAGNOSIS — Z88.6: ICD-10-CM

## 2023-08-02 DIAGNOSIS — R10.9: ICD-10-CM

## 2023-08-02 DIAGNOSIS — Z85.3: ICD-10-CM

## 2023-08-02 DIAGNOSIS — Z88.0: ICD-10-CM

## 2023-08-02 DIAGNOSIS — K21.9: ICD-10-CM

## 2023-08-02 DIAGNOSIS — Z88.1: ICD-10-CM

## 2023-08-02 DIAGNOSIS — Z88.5: ICD-10-CM

## 2023-08-02 DIAGNOSIS — Z91.041: ICD-10-CM

## 2023-08-02 LAB
ALBUMIN SERPL BCP-MCNC: 3.8 G/DL (ref 3.4–4.8)
ALT SERPL W P-5'-P-CCNC: 11 U/L (ref 12–78)
ANION GAP SERPL CALCULATED.3IONS-SCNC: 10 MMOL/L (ref 5–15)
APPEARANCE UR: (no result)
AST SERPL W P-5'-P-CCNC: 15 U/L (ref 10–37)
BASOPHILS # BLD AUTO: 0 K/UL (ref 0–0.2)
BASOPHILS NFR BLD AUTO: 0.4 % (ref 0–2)
BILIRUB SERPL-MCNC: 0.2 MG/DL (ref 0–1)
BILIRUB UR QL STRIP: NEGATIVE
BUN SERPL-MCNC: 10 MG/DL (ref 8–21)
CALCIUM SERPL-MCNC: 8.9 MG/DL (ref 8.4–11)
CHLORIDE SERPL-SCNC: 106 MMOL/L (ref 98–107)
COLOR UR: YELLOW
CREAT SERPL-MCNC: 0.67 MG/DL (ref 0.55–1.3)
EOSINOPHIL # BLD AUTO: 0.1 K/UL (ref 0–0.4)
EOSINOPHIL NFR BLD AUTO: 1.8 % (ref 0–4)
ERYTHROCYTE [DISTWIDTH] IN BLOOD BY AUTOMATED COUNT: 15.1 % (ref 9–15)
GFR SERPL CREATININE-BSD FRML MDRD: 126 ML/MIN (ref 90–?)
GLUCOSE SERPL-MCNC: 84 MG/DL (ref 74–106)
GLUCOSE UR STRIP-MCNC: NEGATIVE MG/DL
HCT VFR BLD AUTO: 32.3 % (ref 36–48)
HGB BLD-MCNC: 10.5 G/DL (ref 12–16)
HGB UR QL STRIP: NEGATIVE
KETONES UR STRIP-MCNC: NEGATIVE MG/DL
LEUKOCYTE ESTERASE UR QL STRIP: NEGATIVE
LIPASE SERPL-CCNC: 355 U/L (ref 73–393)
LYMPHOCYTES # BLD AUTO: 1.9 K/UL (ref 1–5.5)
LYMPHOCYTES NFR BLD AUTO: 34.9 % (ref 20.5–51.5)
MCH RBC QN AUTO: 29 PG (ref 27–31)
MCHC RBC AUTO-ENTMCNC: 33 % (ref 32–36)
MCV RBC AUTO: 88 FL (ref 79–98)
MONOCYTES # BLD MANUAL: 0.2 K/UL (ref 0–1)
MONOCYTES # BLD MANUAL: 4 % (ref 1.7–9.3)
NEUTROPHILS # BLD AUTO: 3.1 K/UL (ref 1.8–7.7)
NEUTROPHILS NFR BLD AUTO: 58.9 % (ref 40–70)
NITRITE UR QL STRIP: NEGATIVE
PH UR STRIP: 6 [PH] (ref 5–8)
PLATELET # BLD AUTO: 265 K/UL (ref 130–430)
PROT UR QL STRIP: NEGATIVE
RBC # BLD AUTO: 3.67 MIL/UL (ref 4.2–6.2)
SP GR UR STRIP: 1.01 (ref 1–1.03)
UROBILINOGEN UR STRIP-MCNC: 0.2 MG/DL (ref 0.2–1)
WBC # BLD AUTO: 5.3 K/UL (ref 4.8–10.8)

## 2023-08-02 PROCEDURE — 81025 URINE PREGNANCY TEST: CPT

## 2023-08-02 PROCEDURE — 80053 COMPREHEN METABOLIC PANEL: CPT

## 2023-08-02 PROCEDURE — 85025 COMPLETE CBC W/AUTO DIFF WBC: CPT

## 2023-08-02 PROCEDURE — 96375 TX/PRO/DX INJ NEW DRUG ADDON: CPT

## 2023-08-02 PROCEDURE — 96361 HYDRATE IV INFUSION ADD-ON: CPT

## 2023-08-02 PROCEDURE — 83690 ASSAY OF LIPASE: CPT

## 2023-08-02 PROCEDURE — 96374 THER/PROPH/DIAG INJ IV PUSH: CPT

## 2023-08-02 PROCEDURE — 81003 URINALYSIS AUTO W/O SCOPE: CPT

## 2023-08-02 PROCEDURE — 99284 EMERGENCY DEPT VISIT MOD MDM: CPT

## 2023-08-02 PROCEDURE — 36415 COLL VENOUS BLD VENIPUNCTURE: CPT

## 2023-08-22 ENCOUNTER — HOSPITAL ENCOUNTER (EMERGENCY)
Dept: HOSPITAL 4 - SED | Age: 38
Discharge: HOME | End: 2023-08-22
Payer: COMMERCIAL

## 2023-08-22 VITALS — HEIGHT: 65 IN | WEIGHT: 120 LBS | BODY MASS INDEX: 19.99 KG/M2

## 2023-08-22 VITALS — RESPIRATION RATE: 19 BRPM | HEART RATE: 83 BPM | SYSTOLIC BLOOD PRESSURE: 122 MMHG | OXYGEN SATURATION: 99 %

## 2023-08-22 VITALS
HEART RATE: 89 BPM | RESPIRATION RATE: 16 BRPM | TEMPERATURE: 98 F | SYSTOLIC BLOOD PRESSURE: 111 MMHG | OXYGEN SATURATION: 100 %

## 2023-08-22 VITALS — TEMPERATURE: 97.4 F

## 2023-08-22 DIAGNOSIS — K21.9: ICD-10-CM

## 2023-08-22 DIAGNOSIS — Z88.5: ICD-10-CM

## 2023-08-22 DIAGNOSIS — N93.9: ICD-10-CM

## 2023-08-22 DIAGNOSIS — Z79.899: ICD-10-CM

## 2023-08-22 DIAGNOSIS — Z88.6: ICD-10-CM

## 2023-08-22 DIAGNOSIS — Z85.3: ICD-10-CM

## 2023-08-22 DIAGNOSIS — Z88.0: ICD-10-CM

## 2023-08-22 DIAGNOSIS — Z91.041: ICD-10-CM

## 2023-08-22 DIAGNOSIS — Z88.1: ICD-10-CM

## 2023-08-22 DIAGNOSIS — Z91.018: ICD-10-CM

## 2023-08-22 DIAGNOSIS — N83.201: Primary | ICD-10-CM

## 2023-08-22 DIAGNOSIS — Z88.8: ICD-10-CM

## 2023-08-22 LAB
ALBUMIN SERPL BCP-MCNC: 3.8 G/DL (ref 3.4–4.8)
ALT SERPL W P-5'-P-CCNC: 19 U/L (ref 12–78)
ANION GAP SERPL CALCULATED.3IONS-SCNC: 8 MMOL/L (ref 5–15)
APPEARANCE UR: (no result)
AST SERPL W P-5'-P-CCNC: 17 U/L (ref 10–37)
BACTERIA URNS QL MICRO: (no result) /HPF
BASOPHILS # BLD AUTO: 0 K/UL (ref 0–0.2)
BASOPHILS NFR BLD AUTO: 0.3 % (ref 0–2)
BILIRUB SERPL-MCNC: 0.3 MG/DL (ref 0–1)
BILIRUB UR QL STRIP: NEGATIVE
BUN SERPL-MCNC: 11 MG/DL (ref 8–21)
CALCIUM SERPL-MCNC: 8.7 MG/DL (ref 8.4–11)
CHLORIDE SERPL-SCNC: 102 MMOL/L (ref 98–107)
CO2 SERPLBLD-SCNC: 29 MMOL/L (ref 23–29)
COLOR UR: YELLOW
CREAT SERPL-MCNC: 0.67 MG/DL (ref 0.55–1.3)
EOSINOPHIL # BLD AUTO: 0.1 K/UL (ref 0–0.4)
EOSINOPHIL NFR BLD AUTO: 2.1 % (ref 0–4)
ERYTHROCYTE [DISTWIDTH] IN BLOOD BY AUTOMATED COUNT: 16 % (ref 9–15)
GFR SERPL CREATININE-BSD FRML MDRD: 126 ML/MIN (ref 90–?)
GLUCOSE SERPL-MCNC: 85 MG/DL (ref 74–106)
GLUCOSE UR STRIP-MCNC: NEGATIVE MG/DL
HCT VFR BLD AUTO: 34.4 % (ref 36–48)
HGB BLD-MCNC: 11.1 G/DL (ref 12–16)
HGB UR QL STRIP: (no result)
KETONES UR STRIP-MCNC: NEGATIVE MG/DL
LEUKOCYTE ESTERASE UR QL STRIP: NEGATIVE
LIPASE SERPL-CCNC: 251 U/L (ref 73–393)
LYMPHOCYTES # BLD AUTO: 2.1 K/UL (ref 1–5.5)
LYMPHOCYTES NFR BLD AUTO: 37 % (ref 20.5–51.5)
MCH RBC QN AUTO: 28 PG (ref 27–31)
MCHC RBC AUTO-ENTMCNC: 32 % (ref 32–36)
MCV RBC AUTO: 87 FL (ref 79–98)
MONOCYTES # BLD MANUAL: 0.3 K/UL (ref 0–1)
MONOCYTES # BLD MANUAL: 5.4 % (ref 1.7–9.3)
NEUTROPHILS # BLD AUTO: 3.2 K/UL (ref 1.8–7.7)
NEUTROPHILS NFR BLD AUTO: 55.2 % (ref 40–70)
NITRITE UR QL STRIP: NEGATIVE
PH UR STRIP: 7 [PH] (ref 5–8)
PLATELET # BLD AUTO: 273 K/UL (ref 130–430)
POTASSIUM SERPL-SCNC: 4.2 MMOL/L (ref 3.5–5.1)
PROT SERPL-MCNC: 7 G/DL (ref 6.4–8.3)
PROT UR QL STRIP: (no result)
RBC # BLD AUTO: 3.95 MIL/UL (ref 4.2–6.2)
RBC #/AREA URNS HPF: >100 /HPF (ref 0–3)
SODIUM SERPLBLD-SCNC: 139 MMOL/L (ref 136–145)
SP GR UR STRIP: 1.02 (ref 1–1.03)
UROBILINOGEN UR STRIP-MCNC: 0.2 MG/DL (ref 0.2–1)
WBC # BLD AUTO: 5.8 K/UL (ref 4.8–10.8)
WBC #/AREA URNS HPF: (no result) /HPF (ref 0–3)

## 2023-08-22 PROCEDURE — 80053 COMPREHEN METABOLIC PANEL: CPT

## 2023-08-22 PROCEDURE — 87086 URINE CULTURE/COLONY COUNT: CPT

## 2023-08-22 PROCEDURE — 96375 TX/PRO/DX INJ NEW DRUG ADDON: CPT

## 2023-08-22 PROCEDURE — 85025 COMPLETE CBC W/AUTO DIFF WBC: CPT

## 2023-08-22 PROCEDURE — 76376 3D RENDER W/INTRP POSTPROCES: CPT

## 2023-08-22 PROCEDURE — 81000 URINALYSIS NONAUTO W/SCOPE: CPT

## 2023-08-22 PROCEDURE — 83690 ASSAY OF LIPASE: CPT

## 2023-08-22 PROCEDURE — 74177 CT ABD & PELVIS W/CONTRAST: CPT

## 2023-08-22 PROCEDURE — 36415 COLL VENOUS BLD VENIPUNCTURE: CPT

## 2023-08-22 PROCEDURE — 81025 URINE PREGNANCY TEST: CPT

## 2023-08-22 PROCEDURE — 99285 EMERGENCY DEPT VISIT HI MDM: CPT

## 2023-08-22 PROCEDURE — 96374 THER/PROPH/DIAG INJ IV PUSH: CPT

## 2023-09-05 ENCOUNTER — HOSPITAL ENCOUNTER (EMERGENCY)
Dept: HOSPITAL 4 - SED | Age: 38
Discharge: HOME | End: 2023-09-05
Payer: COMMERCIAL

## 2023-09-05 VITALS
RESPIRATION RATE: 18 BRPM | OXYGEN SATURATION: 98 % | TEMPERATURE: 98.3 F | SYSTOLIC BLOOD PRESSURE: 128 MMHG | HEART RATE: 85 BPM

## 2023-09-05 VITALS
TEMPERATURE: 98.3 F | HEART RATE: 85 BPM | OXYGEN SATURATION: 98 % | SYSTOLIC BLOOD PRESSURE: 128 MMHG | RESPIRATION RATE: 18 BRPM

## 2023-09-05 VITALS — BODY MASS INDEX: 19.32 KG/M2 | HEIGHT: 62 IN | WEIGHT: 105 LBS

## 2023-09-05 DIAGNOSIS — Z91.018: ICD-10-CM

## 2023-09-05 DIAGNOSIS — Z91.040: ICD-10-CM

## 2023-09-05 DIAGNOSIS — Z91.041: ICD-10-CM

## 2023-09-05 DIAGNOSIS — R11.2: ICD-10-CM

## 2023-09-05 DIAGNOSIS — Z79.899: ICD-10-CM

## 2023-09-05 DIAGNOSIS — Z85.3: ICD-10-CM

## 2023-09-05 DIAGNOSIS — R10.13: Primary | ICD-10-CM

## 2023-09-05 DIAGNOSIS — Z88.0: ICD-10-CM

## 2023-09-05 DIAGNOSIS — K21.9: ICD-10-CM

## 2023-09-05 DIAGNOSIS — Z88.1: ICD-10-CM

## 2023-09-05 DIAGNOSIS — Z88.2: ICD-10-CM

## 2023-09-05 DIAGNOSIS — Z88.5: ICD-10-CM

## 2023-09-05 LAB
ALBUMIN SERPL BCP-MCNC: 3.6 G/DL (ref 3.4–4.8)
ALT SERPL W P-5'-P-CCNC: 19 U/L (ref 12–78)
ANION GAP SERPL CALCULATED.3IONS-SCNC: 9 MMOL/L (ref 5–15)
APPEARANCE UR: CLEAR
AST SERPL W P-5'-P-CCNC: 20 U/L (ref 10–37)
BASOPHILS # BLD AUTO: 0 K/UL (ref 0–0.2)
BASOPHILS NFR BLD AUTO: 0.3 % (ref 0–2)
BILIRUB SERPL-MCNC: 0.3 MG/DL (ref 0–1)
BILIRUB UR QL STRIP: NEGATIVE
BUN SERPL-MCNC: 9 MG/DL (ref 8–21)
CALCIUM SERPL-MCNC: 8.9 MG/DL (ref 8.4–11)
CHLORIDE SERPL-SCNC: 104 MMOL/L (ref 98–107)
CO2 SERPLBLD-SCNC: 28 MMOL/L (ref 23–29)
COLOR UR: YELLOW
CREAT SERPL-MCNC: 0.65 MG/DL (ref 0.55–1.3)
EOSINOPHIL # BLD AUTO: 0.1 K/UL (ref 0–0.4)
EOSINOPHIL NFR BLD AUTO: 1.2 % (ref 0–4)
ERYTHROCYTE [DISTWIDTH] IN BLOOD BY AUTOMATED COUNT: 16.1 % (ref 9–15)
GFR SERPL CREATININE-BSD FRML MDRD: 131 ML/MIN (ref 90–?)
GLUCOSE SERPL-MCNC: 100 MG/DL (ref 74–106)
GLUCOSE UR STRIP-MCNC: NEGATIVE MG/DL
HCT VFR BLD AUTO: 34 % (ref 36–48)
HGB BLD-MCNC: 10.9 G/DL (ref 12–16)
HGB UR QL STRIP: NEGATIVE
KETONES UR STRIP-MCNC: NEGATIVE MG/DL
LEUKOCYTE ESTERASE UR QL STRIP: NEGATIVE
LIPASE SERPL-CCNC: 211 U/L (ref 73–393)
LYMPHOCYTES # BLD AUTO: 2.1 K/UL (ref 1–5.5)
LYMPHOCYTES NFR BLD AUTO: 37.5 % (ref 20.5–51.5)
MCH RBC QN AUTO: 28 PG (ref 27–31)
MCHC RBC AUTO-ENTMCNC: 32 % (ref 32–36)
MCV RBC AUTO: 87 FL (ref 79–98)
MONOCYTES # BLD MANUAL: 0.5 K/UL (ref 0–1)
MONOCYTES # BLD MANUAL: 8.3 % (ref 1.7–9.3)
NEUTROPHILS # BLD AUTO: 2.9 K/UL (ref 1.8–7.7)
NEUTROPHILS NFR BLD AUTO: 52.7 % (ref 40–70)
NITRITE UR QL STRIP: NEGATIVE
PH UR STRIP: 8.5 [PH] (ref 5–8)
PLATELET # BLD AUTO: 252 K/UL (ref 130–430)
POTASSIUM SERPL-SCNC: 4.7 MMOL/L (ref 3.5–5.1)
PROT SERPL-MCNC: 6.8 G/DL (ref 6.4–8.3)
PROT UR QL STRIP: NEGATIVE
RBC # BLD AUTO: 3.93 MIL/UL (ref 4.2–6.2)
SODIUM SERPLBLD-SCNC: 141 MMOL/L (ref 136–145)
SP GR UR STRIP: 1.02 (ref 1–1.03)
UROBILINOGEN UR STRIP-MCNC: 0.2 MG/DL (ref 0.2–1)
WBC # BLD AUTO: 5.6 K/UL (ref 4.8–10.8)

## 2023-09-05 PROCEDURE — 83690 ASSAY OF LIPASE: CPT

## 2023-09-05 PROCEDURE — 99284 EMERGENCY DEPT VISIT MOD MDM: CPT

## 2023-09-05 PROCEDURE — 85025 COMPLETE CBC W/AUTO DIFF WBC: CPT

## 2023-09-05 PROCEDURE — 36415 COLL VENOUS BLD VENIPUNCTURE: CPT

## 2023-09-05 PROCEDURE — 81025 URINE PREGNANCY TEST: CPT

## 2023-09-05 PROCEDURE — 80053 COMPREHEN METABOLIC PANEL: CPT

## 2023-09-05 PROCEDURE — 96375 TX/PRO/DX INJ NEW DRUG ADDON: CPT

## 2023-09-05 PROCEDURE — 96374 THER/PROPH/DIAG INJ IV PUSH: CPT

## 2023-09-05 PROCEDURE — 96361 HYDRATE IV INFUSION ADD-ON: CPT

## 2023-09-05 PROCEDURE — 81003 URINALYSIS AUTO W/O SCOPE: CPT

## 2023-09-05 RX ADMIN — SODIUM CHLORIDE ONE MLS/HR: 9 INJECTION, SOLUTION INTRAVENOUS at 13:24

## 2023-09-05 RX ADMIN — DIPHENHYDRAMINE HYDROCHLORIDE ONE MG: 50 INJECTION, SOLUTION INTRAMUSCULAR; INTRAVENOUS at 13:21

## 2023-09-05 RX ADMIN — MORPHINE SULFATE ONE MG: 4 INJECTION INTRAVENOUS at 13:21

## 2023-09-15 ENCOUNTER — HOSPITAL ENCOUNTER (EMERGENCY)
Dept: HOSPITAL 4 - SED | Age: 38
Discharge: HOME | End: 2023-09-15
Payer: COMMERCIAL

## 2023-09-15 VITALS — WEIGHT: 115 LBS | HEIGHT: 65 IN | BODY MASS INDEX: 19.16 KG/M2

## 2023-09-15 VITALS
OXYGEN SATURATION: 100 % | TEMPERATURE: 98.7 F | SYSTOLIC BLOOD PRESSURE: 115 MMHG | RESPIRATION RATE: 16 BRPM | HEART RATE: 75 BPM

## 2023-09-15 VITALS
TEMPERATURE: 98.7 F | SYSTOLIC BLOOD PRESSURE: 115 MMHG | RESPIRATION RATE: 16 BRPM | HEART RATE: 75 BPM | OXYGEN SATURATION: 100 %

## 2023-09-15 DIAGNOSIS — R14.0: ICD-10-CM

## 2023-09-15 DIAGNOSIS — Z88.1: ICD-10-CM

## 2023-09-15 DIAGNOSIS — Z88.8: ICD-10-CM

## 2023-09-15 DIAGNOSIS — R10.2: Primary | ICD-10-CM

## 2023-09-15 DIAGNOSIS — R11.10: ICD-10-CM

## 2023-09-15 DIAGNOSIS — Z79.899: ICD-10-CM

## 2023-09-15 DIAGNOSIS — Z88.0: ICD-10-CM

## 2023-09-15 DIAGNOSIS — Z88.5: ICD-10-CM

## 2023-09-15 DIAGNOSIS — K21.9: ICD-10-CM

## 2023-09-15 DIAGNOSIS — Z88.6: ICD-10-CM

## 2023-09-15 DIAGNOSIS — Z88.2: ICD-10-CM

## 2023-09-15 DIAGNOSIS — Z91.041: ICD-10-CM

## 2023-09-15 LAB
APPEARANCE UR: (no result)
BACTERIA URNS QL MICRO: (no result) /HPF
BASOPHILS # BLD AUTO: 0 K/UL (ref 0–0.2)
BASOPHILS NFR BLD AUTO: 0.3 % (ref 0–2)
BILIRUB UR QL STRIP: NEGATIVE
COLOR UR: YELLOW
EOSINOPHIL # BLD AUTO: 0 K/UL (ref 0–0.4)
EOSINOPHIL NFR BLD AUTO: 0.3 % (ref 0–4)
ERYTHROCYTE [DISTWIDTH] IN BLOOD BY AUTOMATED COUNT: 16.1 % (ref 9–15)
GLUCOSE UR STRIP-MCNC: NEGATIVE MG/DL
HCT VFR BLD AUTO: 36.2 % (ref 36–48)
HGB BLD-MCNC: 11.8 G/DL (ref 12–16)
HGB UR QL STRIP: (no result)
KETONES UR STRIP-MCNC: NEGATIVE MG/DL
LEUKOCYTE ESTERASE UR QL STRIP: NEGATIVE
LYMPHOCYTES # BLD AUTO: 2.2 K/UL (ref 1–5.5)
LYMPHOCYTES NFR BLD AUTO: 35 % (ref 20.5–51.5)
MCH RBC QN AUTO: 28 PG (ref 27–31)
MCHC RBC AUTO-ENTMCNC: 33 % (ref 32–36)
MCV RBC AUTO: 85 FL (ref 79–98)
MONOCYTES # BLD MANUAL: 0.3 K/UL (ref 0–1)
MONOCYTES # BLD MANUAL: 5.2 % (ref 1.7–9.3)
MUCOUS THREADS URNS QL MICRO: (no result) /LPF
NEUTROPHILS # BLD AUTO: 3.8 K/UL (ref 1.8–7.7)
NEUTROPHILS NFR BLD AUTO: 59.2 % (ref 40–70)
NITRITE UR QL STRIP: NEGATIVE
PH UR STRIP: 6 [PH] (ref 5–8)
PLATELET # BLD AUTO: 316 K/UL (ref 130–430)
PROT UR QL STRIP: NEGATIVE
RBC # BLD AUTO: 4.24 MIL/UL (ref 4.2–6.2)
RBC #/AREA URNS HPF: (no result) /HPF (ref 0–3)
SP GR UR STRIP: >=1.03 (ref 1–1.03)
UROBILINOGEN UR STRIP-MCNC: 0.2 MG/DL (ref 0.2–1)
WBC # BLD AUTO: 6.4 K/UL (ref 4.8–10.8)
WBC #/AREA URNS HPF: (no result) /HPF (ref 0–3)

## 2023-09-15 PROCEDURE — 81025 URINE PREGNANCY TEST: CPT

## 2023-09-15 PROCEDURE — 36415 COLL VENOUS BLD VENIPUNCTURE: CPT

## 2023-09-15 PROCEDURE — 85025 COMPLETE CBC W/AUTO DIFF WBC: CPT

## 2023-09-15 PROCEDURE — 99285 EMERGENCY DEPT VISIT HI MDM: CPT

## 2023-09-15 PROCEDURE — 96374 THER/PROPH/DIAG INJ IV PUSH: CPT

## 2023-09-15 PROCEDURE — 76700 US EXAM ABDOM COMPLETE: CPT

## 2023-09-15 PROCEDURE — 76856 US EXAM PELVIC COMPLETE: CPT

## 2023-09-15 PROCEDURE — 83690 ASSAY OF LIPASE: CPT

## 2023-09-15 PROCEDURE — 81000 URINALYSIS NONAUTO W/SCOPE: CPT

## 2023-09-15 RX ADMIN — ACETAMINOPHEN ONE MG: 325 TABLET ORAL at 15:42

## 2023-09-15 RX ADMIN — ACETAMINOPHEN ONE MG: 325 TABLET ORAL at 15:46

## 2023-10-10 ENCOUNTER — HOSPITAL ENCOUNTER (EMERGENCY)
Dept: HOSPITAL 4 - SED | Age: 38
Discharge: HOME | End: 2023-10-10
Payer: COMMERCIAL

## 2023-10-10 VITALS
TEMPERATURE: 97.2 F | HEART RATE: 78 BPM | SYSTOLIC BLOOD PRESSURE: 118 MMHG | RESPIRATION RATE: 15 BRPM | OXYGEN SATURATION: 97 %

## 2023-10-10 VITALS
OXYGEN SATURATION: 100 % | HEART RATE: 95 BPM | SYSTOLIC BLOOD PRESSURE: 116 MMHG | TEMPERATURE: 97.9 F | RESPIRATION RATE: 14 BRPM

## 2023-10-10 VITALS — HEIGHT: 65 IN | WEIGHT: 120 LBS | BODY MASS INDEX: 19.99 KG/M2

## 2023-10-10 DIAGNOSIS — K21.9: ICD-10-CM

## 2023-10-10 DIAGNOSIS — R11.2: ICD-10-CM

## 2023-10-10 DIAGNOSIS — Z88.5: ICD-10-CM

## 2023-10-10 DIAGNOSIS — Z85.3: ICD-10-CM

## 2023-10-10 DIAGNOSIS — Z88.2: ICD-10-CM

## 2023-10-10 DIAGNOSIS — G89.29: ICD-10-CM

## 2023-10-10 DIAGNOSIS — Z88.0: ICD-10-CM

## 2023-10-10 DIAGNOSIS — Z79.899: ICD-10-CM

## 2023-10-10 DIAGNOSIS — K52.9: Primary | ICD-10-CM

## 2023-10-10 DIAGNOSIS — Z88.1: ICD-10-CM

## 2023-10-10 DIAGNOSIS — Z88.8: ICD-10-CM

## 2023-10-10 DIAGNOSIS — Z91.010: ICD-10-CM

## 2023-10-10 DIAGNOSIS — R10.13: ICD-10-CM

## 2023-10-10 DIAGNOSIS — Z91.041: ICD-10-CM

## 2023-10-10 LAB
ALBUMIN SERPL BCP-MCNC: 4 G/DL (ref 3.4–4.8)
ALT SERPL W P-5'-P-CCNC: 22 U/L (ref 12–78)
ANION GAP SERPL CALCULATED.3IONS-SCNC: 10 MMOL/L (ref 5–15)
AST SERPL W P-5'-P-CCNC: 18 U/L (ref 10–37)
BASOPHILS # BLD AUTO: 0 K/UL (ref 0–0.2)
BASOPHILS NFR BLD AUTO: 0.4 % (ref 0–2)
BILIRUB SERPL-MCNC: 0.6 MG/DL (ref 0–1)
BUN SERPL-MCNC: 14 MG/DL (ref 8–21)
CALCIUM SERPL-MCNC: 9.4 MG/DL (ref 8.4–11)
CHLORIDE SERPL-SCNC: 103 MMOL/L (ref 98–107)
CO2 SERPLBLD-SCNC: 26 MMOL/L (ref 23–29)
CREAT SERPL-MCNC: 0.75 MG/DL (ref 0.55–1.3)
EOSINOPHIL # BLD AUTO: 0 K/UL (ref 0–0.4)
EOSINOPHIL NFR BLD AUTO: 0.5 % (ref 0–4)
ERYTHROCYTE [DISTWIDTH] IN BLOOD BY AUTOMATED COUNT: 16.6 % (ref 9–15)
GFR SERPL CREATININE-BSD FRML MDRD: 111 ML/MIN (ref 90–?)
GLUCOSE SERPL-MCNC: 87 MG/DL (ref 74–106)
HCT VFR BLD AUTO: 35.7 % (ref 36–48)
HGB BLD-MCNC: 11.6 G/DL (ref 12–16)
LYMPHOCYTES # BLD AUTO: 1.5 K/UL (ref 1–5.5)
LYMPHOCYTES NFR BLD AUTO: 32.9 % (ref 20.5–51.5)
MCH RBC QN AUTO: 28 PG (ref 27–31)
MCHC RBC AUTO-ENTMCNC: 33 % (ref 32–36)
MCV RBC AUTO: 86 FL (ref 79–98)
MONOCYTES # BLD MANUAL: 0.3 K/UL (ref 0–1)
MONOCYTES # BLD MANUAL: 5.8 % (ref 1.7–9.3)
NEUTROPHILS # BLD AUTO: 2.8 K/UL (ref 1.8–7.7)
NEUTROPHILS NFR BLD AUTO: 60.4 % (ref 40–70)
PLATELET # BLD AUTO: 258 K/UL (ref 130–430)
POTASSIUM SERPL-SCNC: 4.4 MMOL/L (ref 3.5–5.1)
PROT SERPL-MCNC: 7.2 G/DL (ref 6.4–8.3)
RBC # BLD AUTO: 4.15 MIL/UL (ref 4.2–6.2)
SODIUM SERPLBLD-SCNC: 139 MMOL/L (ref 136–145)
WBC # BLD AUTO: 4.6 K/UL (ref 4.8–10.8)

## 2023-10-10 PROCEDURE — 87040 BLOOD CULTURE FOR BACTERIA: CPT

## 2023-10-10 PROCEDURE — 96374 THER/PROPH/DIAG INJ IV PUSH: CPT

## 2023-10-10 PROCEDURE — 83605 ASSAY OF LACTIC ACID: CPT

## 2023-10-10 PROCEDURE — 85025 COMPLETE CBC W/AUTO DIFF WBC: CPT

## 2023-10-10 PROCEDURE — 96361 HYDRATE IV INFUSION ADD-ON: CPT

## 2023-10-10 PROCEDURE — 96375 TX/PRO/DX INJ NEW DRUG ADDON: CPT

## 2023-10-10 PROCEDURE — 36415 COLL VENOUS BLD VENIPUNCTURE: CPT

## 2023-10-10 PROCEDURE — 99284 EMERGENCY DEPT VISIT MOD MDM: CPT

## 2023-10-10 PROCEDURE — 80053 COMPREHEN METABOLIC PANEL: CPT

## 2023-10-24 ENCOUNTER — HOSPITAL ENCOUNTER (EMERGENCY)
Dept: HOSPITAL 4 - SED | Age: 38
Discharge: HOME | End: 2023-10-24
Payer: COMMERCIAL

## 2023-10-24 VITALS
RESPIRATION RATE: 18 BRPM | HEART RATE: 96 BPM | TEMPERATURE: 98 F | OXYGEN SATURATION: 100 % | SYSTOLIC BLOOD PRESSURE: 108 MMHG

## 2023-10-24 VITALS — WEIGHT: 140 LBS | HEIGHT: 66 IN | BODY MASS INDEX: 22.5 KG/M2

## 2023-10-24 VITALS
TEMPERATURE: 98 F | RESPIRATION RATE: 18 BRPM | OXYGEN SATURATION: 100 % | HEART RATE: 96 BPM | SYSTOLIC BLOOD PRESSURE: 108 MMHG

## 2023-10-24 DIAGNOSIS — Z79.899: ICD-10-CM

## 2023-10-24 DIAGNOSIS — N39.0: Primary | ICD-10-CM

## 2023-10-24 DIAGNOSIS — K21.9: ICD-10-CM

## 2023-10-24 DIAGNOSIS — Z88.8: ICD-10-CM

## 2023-10-24 DIAGNOSIS — Z88.0: ICD-10-CM

## 2023-10-24 DIAGNOSIS — Z85.3: ICD-10-CM

## 2023-10-24 DIAGNOSIS — R10.9: ICD-10-CM

## 2023-10-24 DIAGNOSIS — R30.0: ICD-10-CM

## 2023-10-24 DIAGNOSIS — Z88.2: ICD-10-CM

## 2023-10-24 DIAGNOSIS — Z88.1: ICD-10-CM

## 2023-10-24 DIAGNOSIS — Z91.010: ICD-10-CM

## 2023-10-24 DIAGNOSIS — Z91.041: ICD-10-CM

## 2023-10-24 LAB
APPEARANCE UR: CLEAR
BACTERIA URNS QL MICRO: (no result) /HPF
BILIRUB UR QL STRIP: (no result)
COLOR UR: YELLOW
GLUCOSE UR STRIP-MCNC: NEGATIVE MG/DL
HGB UR QL STRIP: (no result)
KETONES UR STRIP-MCNC: (no result) MG/DL
LEUKOCYTE ESTERASE UR QL STRIP: NEGATIVE
NITRITE UR QL STRIP: NEGATIVE
PH UR STRIP: 5.5 [PH] (ref 5–8)
PROT UR QL STRIP: NEGATIVE
RBC #/AREA URNS HPF: (no result) /HPF (ref 0–3)
SP GR UR STRIP: >=1.03 (ref 1–1.03)
UROBILINOGEN UR STRIP-MCNC: 0.2 MG/DL (ref 0.2–1)
WBC #/AREA URNS HPF: (no result) /HPF (ref 0–3)

## 2023-10-29 ENCOUNTER — HOSPITAL ENCOUNTER (EMERGENCY)
Dept: HOSPITAL 54 - ER | Age: 38
Discharge: HOME | End: 2023-10-29
Payer: MEDICARE

## 2023-10-29 VITALS — SYSTOLIC BLOOD PRESSURE: 109 MMHG | OXYGEN SATURATION: 100 % | DIASTOLIC BLOOD PRESSURE: 85 MMHG

## 2023-10-29 VITALS — TEMPERATURE: 98.2 F

## 2023-10-29 VITALS — HEIGHT: 65 IN | BODY MASS INDEX: 19.16 KG/M2 | WEIGHT: 115 LBS

## 2023-10-29 DIAGNOSIS — G43.909: ICD-10-CM

## 2023-10-29 DIAGNOSIS — Z98.890: ICD-10-CM

## 2023-10-29 DIAGNOSIS — R10.31: ICD-10-CM

## 2023-10-29 DIAGNOSIS — G89.29: Primary | ICD-10-CM

## 2023-10-29 LAB
ALBUMIN SERPL BCP-MCNC: 4.6 G/DL (ref 3.4–5)
ALP SERPL-CCNC: 130 U/L (ref 46–116)
ALT SERPL W P-5'-P-CCNC: 35 U/L (ref 12–78)
APPEARANCE UR: CLEAR
AST SERPL W P-5'-P-CCNC: 24 U/L (ref 15–37)
BACTERIA UR CULT: YES
BASOPHILS # BLD AUTO: 0 K/UL (ref 0–0.2)
BASOPHILS NFR BLD AUTO: 0.3 % (ref 0–2)
BILIRUB DIRECT SERPL-MCNC: 0.1 MG/DL (ref 0–0.2)
BILIRUB SERPL-MCNC: 0.3 MG/DL (ref 0.2–1)
BILIRUB UR QL STRIP: NEGATIVE
BUN SERPL-MCNC: 13 MG/DL (ref 7–18)
CALCIUM SERPL-MCNC: 9.8 MG/DL (ref 8.5–10.1)
CHLORIDE SERPL-SCNC: 102 MMOL/L (ref 98–107)
CO2 SERPL-SCNC: 28 MMOL/L (ref 21–32)
COLOR UR: YELLOW
CREAT SERPL-MCNC: 0.8 MG/DL (ref 0.6–1.3)
DEPRECATED SQUAMOUS URNS QL MICRO: (no result) /HPF
EOSINOPHIL # BLD AUTO: 0.1 K/UL (ref 0–0.7)
EOSINOPHIL NFR BLD AUTO: 1.3 % (ref 0–6)
ERYTHROCYTE [DISTWIDTH] IN BLOOD BY AUTOMATED COUNT: 16.6 % (ref 11.5–15)
GLUCOSE SERPL-MCNC: 104 MG/DL (ref 74–106)
GLUCOSE UR STRIP-MCNC: NEGATIVE MG/DL
HCT VFR BLD AUTO: 38 % (ref 33–45)
HGB BLD-MCNC: 12.4 G/DL (ref 11.5–14.8)
HGB UR QL STRIP: (no result) ERY/UL
KETONES UR STRIP-MCNC: NEGATIVE MG/DL
LEUKOCYTE ESTERASE UR QL STRIP: (no result)
LYMPHOCYTES NFR BLD AUTO: 2.1 K/UL (ref 0.8–4.8)
LYMPHOCYTES NFR BLD AUTO: 45.7 % (ref 20–44)
MCH RBC QN AUTO: 28 PG (ref 26–33)
MCHC RBC AUTO-ENTMCNC: 32 G/DL (ref 31–36)
MCV RBC AUTO: 86 FL (ref 82–100)
MONOCYTES NFR BLD AUTO: 0.3 K/UL (ref 0.1–1.3)
MONOCYTES NFR BLD AUTO: 6.7 % (ref 2–12)
NEUTROPHILS # BLD AUTO: 2.1 K/UL (ref 1.8–8.9)
NEUTROPHILS NFR BLD AUTO: 46 % (ref 43–81)
NITRITE UR QL STRIP: NEGATIVE
PH UR STRIP: 6.5 [PH] (ref 5–8)
PLATELET # BLD AUTO: 239 K/UL (ref 150–450)
POTASSIUM SERPL-SCNC: 4 MMOL/L (ref 3.5–5.1)
PROT SERPL-MCNC: 7.9 G/DL (ref 6.4–8.2)
PROT UR QL STRIP: NEGATIVE MG/DL
RBC # BLD AUTO: 4.46 MIL/UL (ref 4–5.2)
RBC #/AREA URNS HPF: (no result) /HPF (ref 0–2)
SODIUM SERPL-SCNC: 138 MMOL/L (ref 136–145)
SP GR UR STRIP: 1.02 (ref 1–1.03)
UROBILINOGEN UR STRIP-MCNC: 0.2 EU/DL
WBC #/AREA URNS HPF: (no result) /HPF
WBC NRBC COR # BLD AUTO: 4.5 K/UL (ref 4.3–11)

## 2023-11-18 ENCOUNTER — HOSPITAL ENCOUNTER (EMERGENCY)
Dept: HOSPITAL 4 - SED | Age: 38
Discharge: HOME | End: 2023-11-18
Payer: COMMERCIAL

## 2023-11-18 VITALS
OXYGEN SATURATION: 100 % | SYSTOLIC BLOOD PRESSURE: 113 MMHG | TEMPERATURE: 98.3 F | RESPIRATION RATE: 17 BRPM | HEART RATE: 93 BPM

## 2023-11-18 VITALS — WEIGHT: 115 LBS | HEIGHT: 65 IN | BODY MASS INDEX: 19.16 KG/M2

## 2023-11-18 DIAGNOSIS — Z85.3: ICD-10-CM

## 2023-11-18 DIAGNOSIS — Z91.018: ICD-10-CM

## 2023-11-18 DIAGNOSIS — Z88.0: ICD-10-CM

## 2023-11-18 DIAGNOSIS — Z88.8: ICD-10-CM

## 2023-11-18 DIAGNOSIS — K92.1: ICD-10-CM

## 2023-11-18 DIAGNOSIS — Z88.2: ICD-10-CM

## 2023-11-18 DIAGNOSIS — Z79.899: ICD-10-CM

## 2023-11-18 DIAGNOSIS — K21.9: ICD-10-CM

## 2023-11-18 DIAGNOSIS — Z91.040: ICD-10-CM

## 2023-11-18 DIAGNOSIS — Z88.1: ICD-10-CM

## 2023-11-18 DIAGNOSIS — Z88.5: ICD-10-CM

## 2023-11-18 DIAGNOSIS — R10.9: Primary | ICD-10-CM

## 2023-11-18 DIAGNOSIS — Z88.6: ICD-10-CM

## 2023-11-18 LAB
ALBUMIN SERPL BCP-MCNC: 3.7 G/DL (ref 3.4–4.8)
ALT SERPL W P-5'-P-CCNC: 16 U/L (ref 12–78)
ANION GAP SERPL CALCULATED.3IONS-SCNC: 6 MMOL/L (ref 5–15)
APPEARANCE UR: CLEAR
AST SERPL W P-5'-P-CCNC: 15 U/L (ref 10–37)
BASOPHILS # BLD AUTO: 0 K/UL (ref 0–0.2)
BASOPHILS NFR BLD AUTO: 0.3 % (ref 0–2)
BILIRUB DIRECT SERPL-MCNC: 0.1 MG/DL (ref 0–0.3)
BILIRUB SERPL-MCNC: 0.2 MG/DL (ref 0–1)
BILIRUB UR QL STRIP: NEGATIVE
BUN SERPL-MCNC: 9 MG/DL (ref 8–21)
CALCIUM SERPL-MCNC: 9.1 MG/DL (ref 8.4–11)
CHLORIDE SERPL-SCNC: 105 MMOL/L (ref 98–107)
CO2 SERPLBLD-SCNC: 29 MMOL/L (ref 23–29)
COLOR UR: YELLOW
CREAT SERPL-MCNC: 0.71 MG/DL (ref 0.55–1.3)
EOSINOPHIL # BLD AUTO: 0.1 K/UL (ref 0–0.4)
EOSINOPHIL NFR BLD AUTO: 1.9 % (ref 0–4)
ERYTHROCYTE [DISTWIDTH] IN BLOOD BY AUTOMATED COUNT: 16.1 % (ref 9–15)
GFR SERPL CREATININE-BSD FRML MDRD: 118 ML/MIN (ref 90–?)
GLUCOSE SERPL-MCNC: 100 MG/DL (ref 74–106)
GLUCOSE UR STRIP-MCNC: NEGATIVE MG/DL
HCT VFR BLD AUTO: 31.7 % (ref 36–48)
HGB BLD-MCNC: 10.3 G/DL (ref 12–16)
HGB UR QL STRIP: NEGATIVE
KETONES UR STRIP-MCNC: (no result) MG/DL
LEUKOCYTE ESTERASE UR QL STRIP: NEGATIVE
LIPASE SERPL-CCNC: 66 U/L (ref 16–77)
LYMPHOCYTES # BLD AUTO: 2.5 K/UL (ref 1–5.5)
LYMPHOCYTES NFR BLD AUTO: 50 % (ref 20.5–51.5)
MCH RBC QN AUTO: 28 PG (ref 27–31)
MCHC RBC AUTO-ENTMCNC: 33 % (ref 32–36)
MCV RBC AUTO: 86 FL (ref 79–98)
MONOCYTES # BLD MANUAL: 0.4 K/UL (ref 0–1)
MONOCYTES # BLD MANUAL: 7.8 % (ref 1.7–9.3)
NEUTROPHILS # BLD AUTO: 2 K/UL (ref 1.8–7.7)
NEUTROPHILS NFR BLD AUTO: 40 % (ref 40–70)
NITRITE UR QL STRIP: NEGATIVE
PH UR STRIP: 6 [PH] (ref 5–8)
PLATELET # BLD AUTO: 251 K/UL (ref 130–430)
POTASSIUM SERPL-SCNC: 4 MMOL/L (ref 3.5–5.1)
PROT SERPL-MCNC: 6.5 G/DL (ref 6.4–8.3)
PROT UR QL STRIP: NEGATIVE
RBC # BLD AUTO: 3.69 MIL/UL (ref 4.2–6.2)
SODIUM SERPLBLD-SCNC: 140 MMOL/L (ref 136–145)
SP GR UR STRIP: >=1.03 (ref 1–1.03)
UROBILINOGEN UR STRIP-MCNC: 0.2 MG/DL (ref 0.2–1)
WBC # BLD AUTO: 5 K/UL (ref 4.8–10.8)

## 2023-11-18 PROCEDURE — 96375 TX/PRO/DX INJ NEW DRUG ADDON: CPT

## 2023-11-18 PROCEDURE — 83690 ASSAY OF LIPASE: CPT

## 2023-11-18 PROCEDURE — 85025 COMPLETE CBC W/AUTO DIFF WBC: CPT

## 2023-11-18 PROCEDURE — 81001 URINALYSIS AUTO W/SCOPE: CPT

## 2023-11-18 PROCEDURE — 36415 COLL VENOUS BLD VENIPUNCTURE: CPT

## 2023-11-18 PROCEDURE — 99284 EMERGENCY DEPT VISIT MOD MDM: CPT

## 2023-11-18 PROCEDURE — 80076 HEPATIC FUNCTION PANEL: CPT

## 2023-11-18 PROCEDURE — 96374 THER/PROPH/DIAG INJ IV PUSH: CPT

## 2023-11-18 PROCEDURE — 81025 URINE PREGNANCY TEST: CPT

## 2023-11-18 PROCEDURE — 80048 BASIC METABOLIC PNL TOTAL CA: CPT

## 2023-11-18 PROCEDURE — 81003 URINALYSIS AUTO W/O SCOPE: CPT

## 2023-11-28 ENCOUNTER — HOSPITAL ENCOUNTER (EMERGENCY)
Dept: HOSPITAL 4 - SED | Age: 38
Discharge: HOME | End: 2023-11-28
Payer: COMMERCIAL

## 2023-11-28 VITALS
HEART RATE: 99 BPM | OXYGEN SATURATION: 100 % | TEMPERATURE: 98.1 F | SYSTOLIC BLOOD PRESSURE: 131 MMHG | RESPIRATION RATE: 16 BRPM

## 2023-11-28 VITALS — HEIGHT: 65 IN | BODY MASS INDEX: 19.16 KG/M2 | WEIGHT: 115 LBS

## 2023-11-28 VITALS
HEART RATE: 98 BPM | TEMPERATURE: 98.3 F | SYSTOLIC BLOOD PRESSURE: 118 MMHG | OXYGEN SATURATION: 99 % | RESPIRATION RATE: 20 BRPM

## 2023-11-28 DIAGNOSIS — Z88.0: ICD-10-CM

## 2023-11-28 DIAGNOSIS — Z85.3: ICD-10-CM

## 2023-11-28 DIAGNOSIS — Z88.1: ICD-10-CM

## 2023-11-28 DIAGNOSIS — Z79.899: ICD-10-CM

## 2023-11-28 DIAGNOSIS — Z88.2: ICD-10-CM

## 2023-11-28 DIAGNOSIS — Z88.5: ICD-10-CM

## 2023-11-28 DIAGNOSIS — K21.9: ICD-10-CM

## 2023-11-28 DIAGNOSIS — Z91.010: ICD-10-CM

## 2023-11-28 DIAGNOSIS — M79.10: Primary | ICD-10-CM

## 2023-11-28 DIAGNOSIS — Z91.040: ICD-10-CM

## 2023-11-28 DIAGNOSIS — Z88.6: ICD-10-CM

## 2023-11-28 PROCEDURE — 99284 EMERGENCY DEPT VISIT MOD MDM: CPT

## 2023-11-28 PROCEDURE — 96374 THER/PROPH/DIAG INJ IV PUSH: CPT

## 2023-11-28 PROCEDURE — 96375 TX/PRO/DX INJ NEW DRUG ADDON: CPT

## 2023-12-05 ENCOUNTER — HOSPITAL ENCOUNTER (EMERGENCY)
Dept: HOSPITAL 4 - SED | Age: 38
Discharge: LEFT BEFORE DISCHARGE | End: 2023-12-05
Payer: COMMERCIAL

## 2023-12-05 VITALS
RESPIRATION RATE: 16 BRPM | OXYGEN SATURATION: 96 % | HEART RATE: 95 BPM | SYSTOLIC BLOOD PRESSURE: 101 MMHG | TEMPERATURE: 98.6 F

## 2023-12-05 VITALS
OXYGEN SATURATION: 97 % | TEMPERATURE: 97.7 F | HEART RATE: 70 BPM | SYSTOLIC BLOOD PRESSURE: 126 MMHG | RESPIRATION RATE: 16 BRPM

## 2023-12-05 VITALS — BODY MASS INDEX: 19.99 KG/M2 | HEIGHT: 65 IN | WEIGHT: 120 LBS

## 2023-12-05 DIAGNOSIS — Z88.8: ICD-10-CM

## 2023-12-05 DIAGNOSIS — K21.9: ICD-10-CM

## 2023-12-05 DIAGNOSIS — Z88.2: ICD-10-CM

## 2023-12-05 DIAGNOSIS — Z79.899: ICD-10-CM

## 2023-12-05 DIAGNOSIS — J06.9: ICD-10-CM

## 2023-12-05 DIAGNOSIS — Z88.0: ICD-10-CM

## 2023-12-05 DIAGNOSIS — G89.29: Primary | ICD-10-CM

## 2024-01-24 ENCOUNTER — HOSPITAL ENCOUNTER (EMERGENCY)
Dept: HOSPITAL 4 - SED | Age: 39
Discharge: HOME | End: 2024-01-24
Payer: COMMERCIAL

## 2024-01-24 VITALS
RESPIRATION RATE: 18 BRPM | HEART RATE: 99 BPM | OXYGEN SATURATION: 98 % | TEMPERATURE: 98.1 F | SYSTOLIC BLOOD PRESSURE: 107 MMHG

## 2024-01-24 VITALS
OXYGEN SATURATION: 98 % | HEART RATE: 99 BPM | RESPIRATION RATE: 18 BRPM | TEMPERATURE: 98.1 F | SYSTOLIC BLOOD PRESSURE: 107 MMHG

## 2024-01-24 VITALS — BODY MASS INDEX: 19.99 KG/M2 | HEIGHT: 65 IN | WEIGHT: 120 LBS

## 2024-01-24 DIAGNOSIS — Z88.5: ICD-10-CM

## 2024-01-24 DIAGNOSIS — Z88.8: ICD-10-CM

## 2024-01-24 DIAGNOSIS — K21.9: ICD-10-CM

## 2024-01-24 DIAGNOSIS — Z79.899: ICD-10-CM

## 2024-01-24 DIAGNOSIS — R06.6: ICD-10-CM

## 2024-01-24 DIAGNOSIS — Z88.2: ICD-10-CM

## 2024-01-24 DIAGNOSIS — Z88.1: ICD-10-CM

## 2024-01-24 DIAGNOSIS — Z91.040: ICD-10-CM

## 2024-01-24 DIAGNOSIS — Z88.6: ICD-10-CM

## 2024-01-24 DIAGNOSIS — R10.13: Primary | ICD-10-CM

## 2024-01-24 DIAGNOSIS — Z85.3: ICD-10-CM

## 2024-01-24 DIAGNOSIS — Z88.0: ICD-10-CM

## 2024-01-24 DIAGNOSIS — Z91.018: ICD-10-CM

## 2024-01-24 LAB
ALBUMIN SERPL BCP-MCNC: 4.2 G/DL (ref 3.4–4.8)
ALT SERPL W P-5'-P-CCNC: 16 U/L (ref 12–78)
AMYLASE SERPL-CCNC: 96 U/L (ref 0–100)
ANION GAP SERPL CALCULATED.3IONS-SCNC: 10 MMOL/L (ref 5–15)
APPEARANCE UR: CLEAR
AST SERPL W P-5'-P-CCNC: 12 U/L (ref 10–37)
B-HCG SERPL QL: NEGATIVE
BACTERIA URNS QL MICRO: (no result) /HPF
BASOPHILS # BLD AUTO: 0 K/UL (ref 0–0.2)
BASOPHILS NFR BLD AUTO: 0.4 % (ref 0–2)
BILIRUB DIRECT SERPL-MCNC: 0.1 MG/DL (ref 0–0.3)
BILIRUB SERPL-MCNC: 0.2 MG/DL (ref 0–1)
BILIRUB UR QL STRIP: NEGATIVE
BUN SERPL-MCNC: 18 MG/DL (ref 8–21)
CALCIUM SERPL-MCNC: 9.1 MG/DL (ref 8.4–11)
CHLORIDE SERPL-SCNC: 106 MMOL/L (ref 98–107)
CO2 SERPLBLD-SCNC: 28 MMOL/L (ref 23–29)
COLOR UR: YELLOW
CREAT SERPL-MCNC: 0.73 MG/DL (ref 0.55–1.3)
EOSINOPHIL # BLD AUTO: 0 K/UL (ref 0–0.4)
EOSINOPHIL NFR BLD AUTO: 0.3 % (ref 0–4)
ERYTHROCYTE [DISTWIDTH] IN BLOOD BY AUTOMATED COUNT: 18 % (ref 9–15)
GFR SERPL CREATININE-BSD FRML MDRD: 114 ML/MIN (ref 90–?)
GLUCOSE SERPL-MCNC: 91 MG/DL (ref 74–106)
GLUCOSE UR STRIP-MCNC: NEGATIVE MG/DL
HCT VFR BLD AUTO: 32.5 % (ref 36–48)
HGB BLD-MCNC: 11 G/DL (ref 12–16)
HGB UR QL STRIP: (no result)
INR PPP: 1 (ref 0.8–1.2)
KETONES UR STRIP-MCNC: NEGATIVE MG/DL
LEUKOCYTE ESTERASE UR QL STRIP: NEGATIVE
LIPASE SERPL-CCNC: 100 U/L (ref 16–77)
LYMPHOCYTES # BLD AUTO: 2.2 K/UL (ref 1–5.5)
LYMPHOCYTES NFR BLD AUTO: 39.5 % (ref 20.5–51.5)
MCH RBC QN AUTO: 31 PG (ref 27–31)
MCHC RBC AUTO-ENTMCNC: 34 % (ref 32–36)
MCV RBC AUTO: 91 FL (ref 79–98)
MONOCYTES # BLD MANUAL: 0.3 K/UL (ref 0–1)
MONOCYTES # BLD MANUAL: 5.2 % (ref 1.7–9.3)
MUCOUS THREADS URNS QL MICRO: (no result) /LPF
NEUTROPHILS # BLD AUTO: 3 K/UL (ref 1.8–7.7)
NEUTROPHILS NFR BLD AUTO: 54.6 % (ref 40–70)
NITRITE UR QL STRIP: NEGATIVE
PH UR STRIP: 6 [PH] (ref 5–8)
PLATELET # BLD AUTO: 206 K/UL (ref 130–430)
POTASSIUM SERPL-SCNC: 3.4 MMOL/L (ref 3.5–5.1)
PROT SERPL-MCNC: 7.2 G/DL (ref 6.4–8.3)
PROT UR QL STRIP: NEGATIVE
PROTHROMBIN TIME: 10.4 SECS (ref 9.5–12.5)
RBC # BLD AUTO: 3.6 MIL/UL (ref 4.2–6.2)
RBC #/AREA URNS HPF: (no result) /HPF (ref 0–3)
SODIUM SERPLBLD-SCNC: 144 MMOL/L (ref 136–145)
SP GR UR STRIP: >=1.03 (ref 1–1.03)
UROBILINOGEN UR STRIP-MCNC: 0.2 MG/DL (ref 0.2–1)
WBC # BLD AUTO: 5.6 K/UL (ref 4.8–10.8)
WBC #/AREA URNS HPF: (no result) /HPF (ref 0–3)

## 2024-09-09 ENCOUNTER — HOSPITAL ENCOUNTER (EMERGENCY)
Dept: HOSPITAL 4 - SED | Age: 39
Discharge: HOME | End: 2024-09-09
Payer: COMMERCIAL

## 2024-09-09 VITALS — HEIGHT: 65 IN | WEIGHT: 140 LBS | BODY MASS INDEX: 23.32 KG/M2

## 2024-09-09 VITALS
OXYGEN SATURATION: 98 % | HEART RATE: 87 BPM | SYSTOLIC BLOOD PRESSURE: 115 MMHG | TEMPERATURE: 98.3 F | RESPIRATION RATE: 18 BRPM

## 2024-09-09 VITALS
SYSTOLIC BLOOD PRESSURE: 112 MMHG | HEART RATE: 72 BPM | TEMPERATURE: 97.6 F | OXYGEN SATURATION: 99 % | RESPIRATION RATE: 17 BRPM

## 2024-09-09 DIAGNOSIS — K21.9: ICD-10-CM

## 2024-09-09 DIAGNOSIS — Z88.5: ICD-10-CM

## 2024-09-09 DIAGNOSIS — R79.89: ICD-10-CM

## 2024-09-09 DIAGNOSIS — Z79.899: ICD-10-CM

## 2024-09-09 DIAGNOSIS — Z79.2: ICD-10-CM

## 2024-09-09 DIAGNOSIS — Z85.3: ICD-10-CM

## 2024-09-09 DIAGNOSIS — Z91.041: ICD-10-CM

## 2024-09-09 DIAGNOSIS — Z88.1: ICD-10-CM

## 2024-09-09 DIAGNOSIS — Z20.822: ICD-10-CM

## 2024-09-09 DIAGNOSIS — Z88.2: ICD-10-CM

## 2024-09-09 DIAGNOSIS — R51.9: ICD-10-CM

## 2024-09-09 DIAGNOSIS — E86.0: Primary | ICD-10-CM

## 2024-09-09 DIAGNOSIS — Z90.49: ICD-10-CM

## 2024-09-09 DIAGNOSIS — Z91.010: ICD-10-CM

## 2024-09-09 DIAGNOSIS — Z91.040: ICD-10-CM

## 2024-09-09 DIAGNOSIS — Z88.6: ICD-10-CM

## 2024-09-09 DIAGNOSIS — Z98.890: ICD-10-CM

## 2024-09-09 DIAGNOSIS — R10.30: ICD-10-CM

## 2024-09-09 DIAGNOSIS — E87.6: ICD-10-CM

## 2024-09-09 LAB
ALBUMIN SERPL BCP-MCNC: 3.7 G/DL (ref 3.4–4.8)
ALT SERPL W P-5'-P-CCNC: 18 U/L (ref 12–78)
AMPHETAMINES UR QL SCN: NEGATIVE
ANION GAP SERPL CALCULATED.3IONS-SCNC: 7 MMOL/L (ref 5–15)
APPEARANCE UR: CLEAR
AST SERPL W P-5'-P-CCNC: 15 U/L (ref 10–37)
BARBITURATES UR QL SCN: NEGATIVE
BASOPHILS # BLD AUTO: 0 K/UL (ref 0–0.2)
BASOPHILS NFR BLD AUTO: 0.1 % (ref 0–2)
BENZODIAZ UR QL SCN: POSITIVE
BILIRUB DIRECT SERPL-MCNC: 0.1 MG/DL (ref 0–0.3)
BILIRUB SERPL-MCNC: 0.5 MG/DL (ref 0–1)
BILIRUB UR QL STRIP: NEGATIVE
BUN SERPL-MCNC: 26 MG/DL (ref 8–21)
BZE UR QL SCN: NEGATIVE
CALCIUM SERPL-MCNC: 8.8 MG/DL (ref 8.4–11)
CANNABINOIDS UR QL SCN: NEGATIVE
CHLORIDE SERPL-SCNC: 105 MMOL/L (ref 98–107)
CO2 SERPLBLD-SCNC: 27 MMOL/L (ref 23–29)
COLOR UR: YELLOW
CREAT SERPL-MCNC: 0.86 MG/DL (ref 0.55–1.3)
EOSINOPHIL # BLD AUTO: 0 K/UL (ref 0–0.4)
EOSINOPHIL NFR BLD AUTO: 0.5 % (ref 0–4)
ERYTHROCYTE [DISTWIDTH] IN BLOOD BY AUTOMATED COUNT: 16.3 % (ref 9–15)
FLUBV AG UPPER RESP QL IA.RAPID: NEGATIVE
GFR SERPL CREATININE-BSD FRML MDRD: 94 ML/MIN (ref 90–?)
GLUCOSE SERPL-MCNC: 80 MG/DL (ref 74–106)
GLUCOSE UR STRIP-MCNC: NEGATIVE MG/DL
HCT VFR BLD AUTO: 33.5 % (ref 36–48)
HGB BLD-MCNC: 10.8 G/DL (ref 12–16)
HGB UR QL STRIP: NEGATIVE
KETONES UR STRIP-MCNC: NEGATIVE MG/DL
LEUKOCYTE ESTERASE UR QL STRIP: NEGATIVE
LIPASE SERPL-CCNC: 86 U/L (ref 16–77)
LYMPHOCYTES # BLD AUTO: 2.1 K/UL (ref 1–5.5)
LYMPHOCYTES NFR BLD AUTO: 35.1 % (ref 20.5–51.5)
MCH RBC QN AUTO: 28 PG (ref 27–31)
MCHC RBC AUTO-ENTMCNC: 32 % (ref 32–36)
MCV RBC AUTO: 87 FL (ref 79–98)
METHADONE UR-SCNC: NEGATIVE UMOL/L
METHAMPHET UR-SCNC: NEGATIVE UMOL/L
MONOCYTES # BLD MANUAL: 0.6 K/UL (ref 0–1)
MONOCYTES # BLD MANUAL: 10.2 % (ref 1.7–9.3)
NEUTROPHILS # BLD AUTO: 3.2 K/UL (ref 1.8–7.7)
NEUTROPHILS NFR BLD AUTO: 54.1 % (ref 40–70)
NITRITE UR QL STRIP: NEGATIVE
OPIATES UR QL SCN: NEGATIVE
OXYCODONE SERPL-MCNC: NEGATIVE NG/ML
PCP UR QL SCN: NEGATIVE
PH UR STRIP: 5.5 [PH] (ref 5–8)
PLATELET # BLD AUTO: 240 K/UL (ref 130–430)
POTASSIUM SERPL-SCNC: 3.4 MMOL/L (ref 3.5–5.1)
PROT SERPL-MCNC: 6.5 G/DL (ref 6.4–8.3)
PROT UR QL STRIP: NEGATIVE
RBC # BLD AUTO: 3.88 MIL/UL (ref 4.2–6.2)
SARS-COV+SARS-COV-2 AG RESP QL IA.RAPID: NEGATIVE
SODIUM SERPLBLD-SCNC: 139 MMOL/L (ref 136–145)
SP GR UR STRIP: >=1.03 (ref 1–1.03)
TRICYCLICS UR-MCNC: NEGATIVE NG/ML
UROBILINOGEN UR STRIP-MCNC: 0.2 MG/DL (ref 0.2–1)
WBC # BLD AUTO: 5.8 K/UL (ref 4.8–10.8)

## 2024-09-09 PROCEDURE — 80048 BASIC METABOLIC PNL TOTAL CA: CPT

## 2024-09-09 PROCEDURE — 87426 SARSCOV CORONAVIRUS AG IA: CPT

## 2024-09-09 PROCEDURE — 74176 CT ABD & PELVIS W/O CONTRAST: CPT

## 2024-09-09 PROCEDURE — 80307 DRUG TEST PRSMV CHEM ANLYZR: CPT

## 2024-09-09 PROCEDURE — 96365 THER/PROPH/DIAG IV INF INIT: CPT

## 2024-09-09 PROCEDURE — 81001 URINALYSIS AUTO W/SCOPE: CPT

## 2024-09-09 PROCEDURE — 99285 EMERGENCY DEPT VISIT HI MDM: CPT

## 2024-09-09 PROCEDURE — 96361 HYDRATE IV INFUSION ADD-ON: CPT

## 2024-09-09 PROCEDURE — 96376 TX/PRO/DX INJ SAME DRUG ADON: CPT

## 2024-09-09 PROCEDURE — 85025 COMPLETE CBC W/AUTO DIFF WBC: CPT

## 2024-09-09 PROCEDURE — 36415 COLL VENOUS BLD VENIPUNCTURE: CPT

## 2024-09-09 PROCEDURE — 96375 TX/PRO/DX INJ NEW DRUG ADDON: CPT

## 2024-09-09 PROCEDURE — 80076 HEPATIC FUNCTION PANEL: CPT

## 2024-09-09 PROCEDURE — 81003 URINALYSIS AUTO W/O SCOPE: CPT

## 2024-09-09 PROCEDURE — 87804 INFLUENZA ASSAY W/OPTIC: CPT

## 2024-09-09 PROCEDURE — 81025 URINE PREGNANCY TEST: CPT

## 2024-09-09 PROCEDURE — 70450 CT HEAD/BRAIN W/O DYE: CPT

## 2024-09-09 PROCEDURE — 83690 ASSAY OF LIPASE: CPT

## 2024-09-09 RX ADMIN — POTASSIUM CHLORIDE ONE MLS/HR: 14.9 INJECTION, SOLUTION INTRAVENOUS at 15:59

## 2024-09-09 RX ADMIN — MORPHINE SULFATE ONE MG: 2 INJECTION, SOLUTION INTRAMUSCULAR; INTRAVENOUS at 14:51

## 2024-09-09 RX ADMIN — SODIUM CHLORIDE ONE MLS/HR: 9 INJECTION, SOLUTION INTRAVENOUS at 14:49

## 2024-09-09 RX ADMIN — POTASSIUM CHLORIDE ONE MEQ: 1.5 POWDER, FOR SOLUTION ORAL at 14:52

## 2024-09-09 RX ADMIN — DIPHENHYDRAMINE HYDROCHLORIDE ONE MG: 50 INJECTION, SOLUTION INTRAMUSCULAR; INTRAVENOUS at 16:40

## 2024-09-09 RX ADMIN — MORPHINE SULFATE ONE MG: 2 INJECTION, SOLUTION INTRAMUSCULAR; INTRAVENOUS at 16:40

## 2024-09-09 RX ADMIN — ONDANSETRON ONE MG: 2 INJECTION INTRAMUSCULAR; INTRAVENOUS at 14:50
